# Patient Record
Sex: FEMALE | Race: WHITE | Employment: OTHER | URBAN - METROPOLITAN AREA
[De-identification: names, ages, dates, MRNs, and addresses within clinical notes are randomized per-mention and may not be internally consistent; named-entity substitution may affect disease eponyms.]

---

## 2017-03-24 PROBLEM — S70.351A: Status: ACTIVE | Noted: 2017-03-24

## 2017-03-24 PROBLEM — M54.32 BACK PAIN WITH LEFT-SIDED SCIATICA: Status: ACTIVE | Noted: 2017-03-24

## 2017-04-11 ENCOUNTER — HOSPITAL ENCOUNTER (OUTPATIENT)
Dept: OTHER | Age: 72
Discharge: HOME | End: 2017-04-11
Attending: INTERNAL MEDICINE | Admitting: INTERNAL MEDICINE

## 2017-04-11 LAB
ALBUMIN SERPL-MCNC: 4.4 GM/DL (ref 3.4–5)
ALP BLD-CCNC: 71 IU/L (ref 40–128)
ALT SERPL-CCNC: 13 U/L (ref 10–40)
ANION GAP SERPL CALCULATED.3IONS-SCNC: 17 MMOL/L (ref 4–16)
AST SERPL-CCNC: 15 IU/L (ref 15–37)
BILIRUB SERPL-MCNC: 0.3 MG/DL (ref 0–1)
BUN BLDV-MCNC: 17 MG/DL (ref 6–23)
CALCIUM SERPL-MCNC: 10 MG/DL (ref 8.3–10.6)
CEA: 1.6 NG/ML
CHLORIDE BLD-SCNC: 95 MMOL/L (ref 99–110)
CO2: 29 MMOL/L (ref 21–32)
CREAT SERPL-MCNC: 0.7 MG/DL (ref 0.6–1.1)
GFR AFRICAN AMERICAN: >60 ML/MIN/1.73M2
GFR NON-AFRICAN AMERICAN: >60 ML/MIN/1.73M2
GLUCOSE BLD-MCNC: 99 MG/DL (ref 70–140)
LACTATE DEHYDROGENASE: 217 IU/L (ref 120–246)
POTASSIUM SERPL-SCNC: 3.4 MMOL/L (ref 3.5–5.1)
SODIUM BLD-SCNC: 141 MMOL/L (ref 135–145)
TOTAL PROTEIN: 6.7 GM/DL (ref 6.4–8.2)
URIC ACID: 6 MG/DL (ref 2.6–6)

## 2017-04-13 LAB — CA 27.29: 14.4 U/ML

## 2017-04-17 ENCOUNTER — PAT TELEPHONE (OUTPATIENT)
Dept: SURGERY | Age: 72
End: 2017-04-17

## 2017-04-20 ENCOUNTER — HOSPITAL ENCOUNTER (OUTPATIENT)
Dept: SURGERY | Age: 72
Discharge: HOME | End: 2017-04-20
Attending: INTERNAL MEDICINE | Admitting: INTERNAL MEDICINE

## 2017-04-20 VITALS
WEIGHT: 226 LBS | HEIGHT: 65 IN | OXYGEN SATURATION: 99 % | TEMPERATURE: 98.4 F | BODY MASS INDEX: 37.65 KG/M2 | HEART RATE: 78 BPM | RESPIRATION RATE: 16 BRPM | DIASTOLIC BLOOD PRESSURE: 83 MMHG | SYSTOLIC BLOOD PRESSURE: 122 MMHG

## 2017-04-20 RX ORDER — SODIUM CHLORIDE, SODIUM LACTATE, POTASSIUM CHLORIDE, CALCIUM CHLORIDE 600; 310; 30; 20 MG/100ML; MG/100ML; MG/100ML; MG/100ML
INJECTION, SOLUTION INTRAVENOUS CONTINUOUS
Status: DISCONTINUED | OUTPATIENT
Start: 2017-04-20 | End: 2017-04-21 | Stop reason: HOSPADM

## 2017-04-20 RX ORDER — FUROSEMIDE 20 MG/1
20 TABLET ORAL DAILY
Status: ON HOLD | COMMUNITY
End: 2020-12-15 | Stop reason: HOSPADM

## 2017-04-20 RX ADMIN — SODIUM CHLORIDE, SODIUM LACTATE, POTASSIUM CHLORIDE, CALCIUM CHLORIDE: 600; 310; 30; 20 INJECTION, SOLUTION INTRAVENOUS at 09:31

## 2017-04-20 ASSESSMENT — PAIN DESCRIPTION - PAIN TYPE
TYPE: ACUTE PAIN
TYPE: ACUTE PAIN

## 2017-04-20 ASSESSMENT — PAIN DESCRIPTION - DESCRIPTORS
DESCRIPTORS: ACHING;DULL
DESCRIPTORS: ACHING

## 2017-04-20 ASSESSMENT — PAIN DESCRIPTION - LOCATION: LOCATION: HEAD

## 2017-04-20 ASSESSMENT — PAIN SCALES - GENERAL
PAINLEVEL_OUTOF10: 3
PAINLEVEL_OUTOF10: 3

## 2017-04-20 ASSESSMENT — PAIN - FUNCTIONAL ASSESSMENT: PAIN_FUNCTIONAL_ASSESSMENT: 0-10

## 2017-10-17 ENCOUNTER — HOSPITAL ENCOUNTER (OUTPATIENT)
Dept: OTHER | Age: 72
Discharge: HOME | End: 2017-10-17
Attending: INTERNAL MEDICINE | Admitting: INTERNAL MEDICINE

## 2017-10-17 LAB
ALBUMIN SERPL-MCNC: 4.6 GM/DL (ref 3.4–5)
ALP BLD-CCNC: 70 IU/L (ref 40–129)
ALT SERPL-CCNC: 13 U/L (ref 10–40)
ANION GAP SERPL CALCULATED.3IONS-SCNC: 15 MMOL/L (ref 4–16)
AST SERPL-CCNC: 17 IU/L (ref 15–37)
BILIRUB SERPL-MCNC: 0.4 MG/DL (ref 0–1)
BUN BLDV-MCNC: 22 MG/DL (ref 6–23)
CALCIUM SERPL-MCNC: 10.7 MG/DL (ref 8.3–10.6)
CHLORIDE BLD-SCNC: 99 MMOL/L (ref 99–110)
CO2: 29 MMOL/L (ref 21–32)
CREAT SERPL-MCNC: 0.7 MG/DL (ref 0.6–1.1)
GFR AFRICAN AMERICAN: >60 ML/MIN/1.73M2
GFR NON-AFRICAN AMERICAN: >60 ML/MIN/1.73M2
GLUCOSE BLD-MCNC: 87 MG/DL (ref 70–140)
LACTATE DEHYDROGENASE: 228 IU/L (ref 120–246)
POTASSIUM SERPL-SCNC: 4.6 MMOL/L (ref 3.5–5.1)
SODIUM BLD-SCNC: 143 MMOL/L (ref 135–145)
TOTAL PROTEIN: 6.8 GM/DL (ref 6.4–8.2)

## 2017-11-20 ENCOUNTER — HOSPITAL ENCOUNTER (OUTPATIENT)
Dept: WOMENS IMAGING | Age: 72
Discharge: HOME | End: 2017-11-20
Attending: INTERNAL MEDICINE | Admitting: INTERNAL MEDICINE

## 2017-11-20 DIAGNOSIS — Z78.0 ASYMPTOMATIC AGE-RELATED POSTMENOPAUSAL STATE: ICD-10-CM

## 2017-11-21 ENCOUNTER — HOSPITAL ENCOUNTER (OUTPATIENT)
Dept: CT IMAGING | Age: 72
Discharge: HOME | End: 2017-11-21
Attending: INTERNAL MEDICINE | Admitting: INTERNAL MEDICINE

## 2017-11-21 DIAGNOSIS — C50.411 MALIGNANT NEOPLASM OF UPPER-OUTER QUADRANT OF RIGHT FEMALE BREAST, UNSPECIFIED ESTROGEN RECEPTOR STATUS (HCC): ICD-10-CM

## 2017-11-21 LAB
ALBUMIN SERPL-MCNC: 4.8 GM/DL (ref 3.4–5)
ALP BLD-CCNC: 76 IU/L (ref 40–128)
ALT SERPL-CCNC: 29 U/L (ref 10–40)
ANION GAP SERPL CALCULATED.3IONS-SCNC: 13 MMOL/L (ref 4–16)
AST SERPL-CCNC: 20 IU/L (ref 15–37)
ATYPICAL LYMPHOCYTE ABSOLUTE COUNT: ABNORMAL
BANDED NEUTROPHILS ABSOLUTE COUNT: 0.1 K/CU MM
BANDED NEUTROPHILS RELATIVE PERCENT: 2 % (ref 5–11)
BILIRUB SERPL-MCNC: 0.4 MG/DL (ref 0–1)
BUN BLDV-MCNC: 18 MG/DL (ref 6–23)
CALCIUM SERPL-MCNC: 10.3 MG/DL (ref 8.3–10.6)
CEA: 1.8 NG/ML
CHLORIDE BLD-SCNC: 98 MMOL/L (ref 99–110)
CO2: 30 MMOL/L (ref 21–32)
CREAT SERPL-MCNC: 1 MG/DL (ref 0.6–1.1)
DIFFERENTIAL TYPE: ABNORMAL
EOSINOPHILS ABSOLUTE: 0.1 K/CU MM
EOSINOPHILS RELATIVE PERCENT: 1 % (ref 0–3)
GFR AFRICAN AMERICAN: >60 ML/MIN/1.73M2
GFR NON-AFRICAN AMERICAN: 55 ML/MIN/1.73M2
GLUCOSE FASTING: 104 MG/DL (ref 70–99)
HCT VFR BLD CALC: 46.7 % (ref 37–47)
HEMOGLOBIN: 15.4 GM/DL (ref 12.5–16)
LYMPHOCYTES ABSOLUTE: 1.7 K/CU MM
LYMPHOCYTES RELATIVE PERCENT: 33 % (ref 24–44)
MCH RBC QN AUTO: 33.7 PG (ref 27–31)
MCHC RBC AUTO-ENTMCNC: 33 % (ref 32–36)
MCV RBC AUTO: 102.2 FL (ref 78–100)
MONOCYTES ABSOLUTE: 0.3 K/CU MM
MONOCYTES RELATIVE PERCENT: 5 % (ref 0–4)
PDW BLD-RTO: 12.1 % (ref 11.7–14.9)
PLATELET # BLD: 230 K/CU MM (ref 140–440)
PMV BLD AUTO: 10 FL (ref 7.5–11.1)
POTASSIUM SERPL-SCNC: 4.5 MMOL/L (ref 3.5–5.1)
RBC # BLD: 4.57 M/CU MM (ref 4.2–5.4)
SEGMENTED NEUTROPHILS ABSOLUTE COUNT: 3 K/CU MM
SEGMENTED NEUTROPHILS RELATIVE PERCENT: 59 % (ref 36–66)
SODIUM BLD-SCNC: 141 MMOL/L (ref 135–145)
TOTAL PROTEIN: 7.1 GM/DL (ref 6.4–8.2)
WBC # BLD: 5.2 K/CU MM (ref 4–10.5)

## 2017-11-21 RX ORDER — 0.9 % SODIUM CHLORIDE 0.9 %
10 VIAL (ML) INJECTION
Status: COMPLETED | OUTPATIENT
Start: 2017-11-21 | End: 2017-11-21

## 2017-11-21 RX ORDER — TC 99M MEDRONATE 20 MG/10ML
25 INJECTION, POWDER, LYOPHILIZED, FOR SOLUTION INTRAVENOUS
Status: DISCONTINUED | OUTPATIENT
Start: 2017-11-21 | End: 2017-11-22 | Stop reason: HOSPADM

## 2017-11-21 RX ADMIN — Medication 10 ML: at 11:53

## 2017-11-30 LAB — CA 27.29: 15.4 U/ML

## 2018-01-19 ENCOUNTER — HOSPITAL ENCOUNTER (OUTPATIENT)
Dept: SLEEP CENTER | Age: 73
Discharge: HOME | End: 2018-01-19
Attending: FAMILY MEDICINE | Admitting: FAMILY MEDICINE

## 2018-01-19 VITALS
SYSTOLIC BLOOD PRESSURE: 119 MMHG | DIASTOLIC BLOOD PRESSURE: 72 MMHG | HEART RATE: 79 BPM | BODY MASS INDEX: 36.96 KG/M2 | WEIGHT: 230 LBS | HEIGHT: 66 IN

## 2018-01-19 DIAGNOSIS — R06.83 SNORING: ICD-10-CM

## 2018-01-19 DIAGNOSIS — G47.10 HYPERSOMNOLENCE: Primary | ICD-10-CM

## 2018-01-19 ASSESSMENT — SLEEP AND FATIGUE QUESTIONNAIRES
HOW LIKELY ARE YOU TO NOD OFF OR FALL ASLEEP WHILE LYING DOWN TO REST IN THE AFTERNOON WHEN CIRCUMSTANCES PERMIT: 0
HOW LIKELY ARE YOU TO NOD OFF OR FALL ASLEEP WHEN YOU ARE A PASSENGER IN A CAR FOR AN HOUR WITHOUT A BREAK: 0
HOW LIKELY ARE YOU TO NOD OFF OR FALL ASLEEP IN A CAR, WHILE STOPPED FOR A FEW MINUTES IN TRAFFIC: 0
HOW LIKELY ARE YOU TO NOD OFF OR FALL ASLEEP WHILE SITTING AND READING: 0
HOW LIKELY ARE YOU TO NOD OFF OR FALL ASLEEP WHILE SITTING AND TALKING TO SOMEONE: 0
ESS TOTAL SCORE: 3
HOW LIKELY ARE YOU TO NOD OFF OR FALL ASLEEP WHILE WATCHING TV: 3
HOW LIKELY ARE YOU TO NOD OFF OR FALL ASLEEP WHILE SITTING INACTIVE IN A PUBLIC PLACE: 0
NECK CIRCUMFERENCE (INCHES): 14.5
HOW LIKELY ARE YOU TO NOD OFF OR FALL ASLEEP WHILE SITTING QUIETLY AFTER LUNCH WITHOUT ALCOHOL: 0

## 2018-01-19 NOTE — PROGRESS NOTES
grandmother, paternal aunt, and paternal uncle; Depression in her sister; Emphysema in her mother; Heart Disease in her father, mother, and sister; Liver Disease in her sister. SOCIAL HISTORY:   reports that she has never smoked. She has never used smokeless tobacco.    ALLERGIES:  Patient is allergic to tape [adhesive tape] and antivert [meclizine hcl]. REVIEW OF SYSTEMS:  Constitutional: Negative for fever  HENT: Negative for sore throat  Eyes: Negative for redness   Respiratory: Negative for cough  Cardiovascular: Negative for chest pain  Gastrointestinal: Negative for vomiting, diarrhea    Musculoskeletal: Negative for arthralgias   Skin: Negative for rash  Neurological: Negative for syncope        Objective:   PHYSICAL EXAM:  Blood pressure 119/72, pulse 79, height 5' 5.5\" (1.664 m), weight 230 lb (104.3 kg), not currently breastfeeding.'    [place]: Total score: 3    Neck Circumference:  Neck circumference: 14.5  Inches    BMI:  Body mass index is 37.69 kg/m². Gen: No distress. Eyes: PERRL. No sclera icterus. No conjunctival injection. ENT: No discharge. Pharynx clear. External appearance of ears and nose normal.Mallampati score 4. Neck: Trachea midline. No obvious mass. Resp: No accessory muscle use. No crackles. No wheezes. No rhonchi. No dullness on percussion. CV: Regular rate. Regular rhythm. No murmur or rub. No edema. GI: Non-tender. Non-distended. No hernia. Skin: Warm, dry, normal texture and turgor. No nodule on exposed extremities. Lymph: No cervical LAD. No supraclavicular LAD. M/S: No cyanosis. No clubbing. No joint deformity. Neuro: Moves all four extremities. CN 2-12 tested, no defect noted. Psych: Oriented x 3. No anxiety. Awake. Alert. Intact judgement and insight.     Current Outpatient Prescriptions on File Prior to Encounter   Medication Sig Dispense Refill    PRAVASTATIN SODIUM PO Take by mouth      Anastrozole (ARIMIDEX PO) Take by mouth      CARBIDOPA PO

## 2018-02-20 ENCOUNTER — HOSPITAL ENCOUNTER (OUTPATIENT)
Dept: SLEEP CENTER | Age: 73
Discharge: HOME | End: 2018-02-20
Attending: INTERNAL MEDICINE | Admitting: INTERNAL MEDICINE

## 2018-02-20 VITALS — HEIGHT: 60 IN | WEIGHT: 215 LBS | BODY MASS INDEX: 42.21 KG/M2

## 2018-02-21 NOTE — PROGRESS NOTES
2/21/2018  sleep study  for Gorge Ulloa  1945 is complete. Results are pending physician review.     Electronically signed by Denisa Snyder on 2/21/2018 at 6:50 AM

## 2018-03-27 ENCOUNTER — HOSPITAL ENCOUNTER (OUTPATIENT)
Dept: ULTRASOUND IMAGING | Age: 73
Discharge: HOME | End: 2018-03-27
Attending: FAMILY MEDICINE | Admitting: FAMILY MEDICINE

## 2018-03-27 DIAGNOSIS — E04.1 NONTOXIC UNINODULAR GOITER: ICD-10-CM

## 2018-03-27 DIAGNOSIS — E04.1 NONTOXIC SINGLE THYROID NODULE: ICD-10-CM

## 2018-06-06 ENCOUNTER — HOSPITAL ENCOUNTER (OUTPATIENT)
Dept: OTHER | Age: 73
Discharge: HOME | End: 2018-06-06
Attending: INTERNAL MEDICINE | Admitting: INTERNAL MEDICINE

## 2018-06-06 LAB
ALBUMIN SERPL-MCNC: 4.6 GM/DL (ref 3.4–5)
ALP BLD-CCNC: 78 IU/L (ref 40–128)
ALT SERPL-CCNC: 39 U/L (ref 10–40)
ANION GAP SERPL CALCULATED.3IONS-SCNC: 15 MMOL/L (ref 4–16)
AST SERPL-CCNC: 30 IU/L (ref 15–37)
BILIRUB SERPL-MCNC: 0.4 MG/DL (ref 0–1)
BUN BLDV-MCNC: 20 MG/DL (ref 6–23)
CALCIUM SERPL-MCNC: 9.9 MG/DL (ref 8.3–10.6)
CEA: 2.1 NG/ML
CHLORIDE BLD-SCNC: 99 MMOL/L (ref 99–110)
CO2: 29 MMOL/L (ref 21–32)
CREAT SERPL-MCNC: 0.7 MG/DL (ref 0.6–1.1)
GFR AFRICAN AMERICAN: >60 ML/MIN/1.73M2
GFR NON-AFRICAN AMERICAN: >60 ML/MIN/1.73M2
GLUCOSE BLD-MCNC: 106 MG/DL (ref 70–99)
POTASSIUM SERPL-SCNC: 3.7 MMOL/L (ref 3.5–5.1)
SODIUM BLD-SCNC: 143 MMOL/L (ref 135–145)
TOTAL PROTEIN: 6.8 GM/DL (ref 6.4–8.2)

## 2018-06-08 LAB — CA 27.29: 12.9 U/ML

## 2019-01-22 ENCOUNTER — HOSPITAL ENCOUNTER (OUTPATIENT)
Age: 74
Setting detail: SPECIMEN
Discharge: HOME | End: 2019-01-22
Payer: MEDICARE

## 2019-01-22 LAB
ALBUMIN SERPL-MCNC: 4.4 GM/DL (ref 3.4–5)
ALP BLD-CCNC: 64 IU/L (ref 40–129)
ALT SERPL-CCNC: 22 U/L (ref 10–40)
ANION GAP SERPL CALCULATED.3IONS-SCNC: 14 MMOL/L (ref 4–16)
AST SERPL-CCNC: 22 IU/L (ref 15–37)
BILIRUB SERPL-MCNC: 0.3 MG/DL (ref 0–1)
BUN BLDV-MCNC: 19 MG/DL (ref 6–23)
CALCIUM SERPL-MCNC: 10.1 MG/DL (ref 8.3–10.6)
CEA: 2.1 NG/ML
CHLORIDE BLD-SCNC: 99 MMOL/L (ref 99–110)
CO2: 28 MMOL/L (ref 21–32)
CREAT SERPL-MCNC: 0.8 MG/DL (ref 0.6–1.1)
GFR AFRICAN AMERICAN: >60 ML/MIN/1.73M2
GFR NON-AFRICAN AMERICAN: >60 ML/MIN/1.73M2
GLUCOSE BLD-MCNC: 101 MG/DL (ref 70–99)
POTASSIUM SERPL-SCNC: 3.6 MMOL/L (ref 3.5–5.1)
SODIUM BLD-SCNC: 141 MMOL/L (ref 135–145)
TOTAL PROTEIN: 6.6 GM/DL (ref 6.4–8.2)

## 2019-01-22 PROCEDURE — 80053 COMPREHEN METABOLIC PANEL: CPT

## 2019-01-22 PROCEDURE — 86300 IMMUNOASSAY TUMOR CA 15-3: CPT

## 2019-01-22 PROCEDURE — 82378 CARCINOEMBRYONIC ANTIGEN: CPT

## 2019-01-25 LAB — CA 27.29: 14.8 U/ML

## 2019-06-01 ENCOUNTER — APPOINTMENT (OUTPATIENT)
Dept: GENERAL RADIOLOGY | Age: 74
End: 2019-06-01
Payer: MEDICARE

## 2019-06-01 ENCOUNTER — APPOINTMENT (OUTPATIENT)
Dept: ULTRASOUND IMAGING | Age: 74
End: 2019-06-01
Payer: MEDICARE

## 2019-06-01 ENCOUNTER — HOSPITAL ENCOUNTER (EMERGENCY)
Age: 74
Discharge: HOME | End: 2019-06-01
Payer: MEDICARE

## 2019-06-01 VITALS
RESPIRATION RATE: 15 BRPM | HEIGHT: 66 IN | OXYGEN SATURATION: 98 % | SYSTOLIC BLOOD PRESSURE: 142 MMHG | BODY MASS INDEX: 37.93 KG/M2 | TEMPERATURE: 97.9 F | DIASTOLIC BLOOD PRESSURE: 108 MMHG | HEART RATE: 58 BPM | WEIGHT: 236 LBS

## 2019-06-01 DIAGNOSIS — M25.572 ACUTE LEFT ANKLE PAIN: Primary | ICD-10-CM

## 2019-06-01 DIAGNOSIS — M25.472 LEFT ANKLE SWELLING: ICD-10-CM

## 2019-06-01 LAB — URIC ACID: 5.2 MG/DL (ref 2.6–6)

## 2019-06-01 PROCEDURE — 36415 COLL VENOUS BLD VENIPUNCTURE: CPT

## 2019-06-01 PROCEDURE — 6370000000 HC RX 637 (ALT 250 FOR IP): Performed by: PHYSICIAN ASSISTANT

## 2019-06-01 PROCEDURE — 84550 ASSAY OF BLOOD/URIC ACID: CPT

## 2019-06-01 PROCEDURE — 73610 X-RAY EXAM OF ANKLE: CPT

## 2019-06-01 PROCEDURE — 99284 EMERGENCY DEPT VISIT MOD MDM: CPT

## 2019-06-01 PROCEDURE — 93971 EXTREMITY STUDY: CPT

## 2019-06-01 RX ORDER — HYDROCODONE BITARTRATE AND ACETAMINOPHEN 5; 325 MG/1; MG/1
1 TABLET ORAL EVERY 6 HOURS PRN
Qty: 10 TABLET | Refills: 0 | Status: SHIPPED | OUTPATIENT
Start: 2019-06-01 | End: 2019-06-04

## 2019-06-01 RX ORDER — HYDROCODONE BITARTRATE AND ACETAMINOPHEN 5; 325 MG/1; MG/1
1 TABLET ORAL ONCE
Status: COMPLETED | OUTPATIENT
Start: 2019-06-01 | End: 2019-06-01

## 2019-06-01 RX ADMIN — HYDROCODONE BITARTRATE AND ACETAMINOPHEN 1 TABLET: 5; 325 TABLET ORAL at 18:32

## 2019-06-01 ASSESSMENT — PAIN DESCRIPTION - LOCATION: LOCATION: ANKLE

## 2019-06-01 ASSESSMENT — PAIN SCALES - GENERAL
PAINLEVEL_OUTOF10: 9
PAINLEVEL_OUTOF10: 9

## 2019-06-01 ASSESSMENT — PAIN DESCRIPTION - PAIN TYPE: TYPE: ACUTE PAIN

## 2019-06-01 ASSESSMENT — PAIN DESCRIPTION - DESCRIPTORS: DESCRIPTORS: SHARP;STABBING

## 2019-06-01 ASSESSMENT — PAIN DESCRIPTION - ORIENTATION: ORIENTATION: LEFT

## 2019-06-01 NOTE — ED PROVIDER NOTES
eMERGENCY dEPARTMENT eNCOUnter        [address] [facility]    Chief Complaint   Patient presents with    Ankle Pain     left ankle pain, patient states unable to tolerate weight bearing on left ankle       HPI    Elsa Moreno is a 76 y.o. female who presents with left ankle pain and swelling. Onset was today. Context:  Patient states she had a caudal block in the right hip yesterday at [facility] performed by Dr. Georges Arce. She states he told her she may have some swelling due to the steroids but stated this would probably be in her face. Pain and swelling is the left ankle. Constant since onset. Pain is worse with weightbearing. Characterized as sharp, stabbing. Severity 9/10. She states it feels similar to previous gout flares and plantar fasciitis. Denies redness or warmth. Denies chest pain or sob. No recent travel, trauma, surgery. No history of DVT or PE.      REVIEW OF SYSTEMS    See HPI for further details. Review of systems otherwise negative. Constitutional:  Denies fever. Respiratory:  Denies any shortness of breath. Cardiovascular:  Denies chest pain. Musculoskeletal:  + left ankle pain, swelling. Integument:  Denies skin changes. Neurologic:  Denies numbness, tingling.      PAST MEDICAL HISTORY    Past Medical History:   Diagnosis Date    Arthritis     Asthma     last flare up 5 yrs ago    Cancer [facility])     \"dx with right breast cancer 1972- tx with surgery and tx with chemo and radiation- then 2013 it came  back in the same breast- tx with chemo -follows with Dr Dariela Faulkner History of blood transfusion     History of motion sickness     History of UTI     last one UTI    Hyperlipidemia     Hypertension     IBS (irritable bowel syndrome)     Lumbar stenosis     PONV (postoperative nausea and vomiting)     Reflux     Stress incontinence     Thyroid nodule     for bx 4/27/2017       SURGICAL HISTORY    Past Surgical History:   Procedure Laterality Date    APPENDECTOMY      took with the hyster    CHOLECYSTECTOMY  2004    COLONOSCOPY  2011    COLONOSCOPY  04/20/2017    Normal    DILATION AND CURETTAGE OF UTERUS  40+ yr ago    after miscarriage     FINGER SURGERY Left 2014    index finger - removed a nodule    FOREARM SURGERY Right 1998     fx  repair with pin placement    HAND TENDON SURGERY  2012    Tendon removed left arm placed left thumb    HEEL SPUR SURGERY Bilateral right    done in 2015 and right done 2013    HYSTERECTOMY  age 45    \"took ovaries and appendix also\"    JOINT REPLACEMENT  right    knee- done 2011    JOINT REPLACEMENT  left    knee- done 12/2016    MASTECTOMY, RADICAL Right 1972    took one lymph node and was positive    OTHER SURGICAL HISTORY  04/27/2017    Thyroid biopsy    SKIN BIOPSY      TUNNELED VENOUS PORT PLACEMENT  2013    left side of chest       CURRENT MEDICATIONS    Current Outpatient Rx   Medication Sig Dispense Refill    PRAVASTATIN SODIUM PO Take by mouth      Anastrozole (ARIMIDEX PO) Take by mouth      CARBIDOPA PO Take by mouth      FUROSEMIDE PO Take by mouth      ibuprofen (ADVIL;MOTRIN) 800 MG tablet 1 TABLET BY MOUTH TWICE A DAY FOR BACK PAIN 40 tablet 0    tiZANidine (ZANAFLEX) 4 MG tablet       traMADol (ULTRAM) 50 MG tablet every 6 hours as needed       cyclobenzaprine (FLEXERIL) 10 MG tablet Take 0.5 tablets by mouth 2 times daily as needed for Muscle spasms 12 tablet 0    naphazoline-pheniramine (VISINE-A) 0.025-0.3 % ophthalmic solution Place 1 drop into both eyes daily.  polyethyl glycol-propyl glycol 0.4-0.3 % (SYSTANE) 0.4-0.3 % ophthalmic solution 1 drop nightly.  atenolol (TENORMIN) 25 MG tablet Take 25 mg by mouth daily.  fluticasone (VERAMYST) 27.5 MCG/SPRAY nasal spray 2 sprays by Nasal route daily.  hydrochlorothiazide (HYDRODIURIL) 25 MG tablet Take 25 mg by mouth daily.  omeprazole (PRILOSEC) 20 MG capsule Take 20 mg by mouth daily.         acetaminophen (TYLENOL) 500 MG tablet Take 500 mg by mouth 3 times daily as needed.  bismuth subsalicylate (PEPTO BISMOL) 262 MG/15ML suspension Take 15 mLs by mouth daily as needed. ALLERGIES    Allergies   Allergen Reactions    Tape Kathlee Denver Tape] Dermatitis    Antivert [Meclizine Hcl] Rash       FAMILY HISTORY    Family History   Problem Relation Age of Onset    Emphysema Mother     Heart Disease Mother     Heart Disease Father         MI    Liver Disease Sister     Depression Sister     Heart Disease Sister     Cancer Paternal Aunt     Cancer Paternal Uncle     Cancer Maternal Grandmother        SOCIAL HISTORY    Social History     Socioeconomic History    Marital status:       Spouse name: None    Number of children: None    Years of education: None    Highest education level: None   Occupational History    None   Social Needs    Financial resource strain: None    Food insecurity:     Worry: None     Inability: None    Transportation needs:     Medical: None     Non-medical: None   Tobacco Use    Smoking status: Never Smoker    Smokeless tobacco: Never Used   Substance and Sexual Activity    Alcohol use: No    Drug use: No    Sexual activity: None   Lifestyle    Physical activity:     Days per week: None     Minutes per session: None    Stress: None   Relationships    Social connections:     Talks on phone: None     Gets together: None     Attends Orthodoxy service: None     Active member of club or organization: None     Attends meetings of clubs or organizations: None     Relationship status: None    Intimate partner violence:     Fear of current or ex partner: None     Emotionally abused: None     Physically abused: None     Forced sexual activity: None   Other Topics Concern    None   Social History Narrative    None       PHYSICAL EXAM    VITAL SIGNS: BP (!) 160/75   Pulse 76   Temp 97.9 °F (36.6 °C)   Resp 16   Ht 5' 5.5\" (1.664 m)   Wt 236 lb (107 kg)   SpO2 99%   BMI 38.68 kg/m²   Constitutional:  Well developed, well nourished, no acute distress, non-toxic appearance   HENT:  NC/AT. Ears, nose, mouth normal.  Respiratory:  Normal respiratory effort. Musculoskeletal:  Pitting edema of the bilateral LE, left slightly worse than right with mild tenderness to palpation around the ankle. No tenderness to the sole of the foot, calf, or tibial plateau. Dorsalis pedis pulse symmetric. Integument:  Warm and dry. No erythema, no rashes or lesions. No bruising. Neurologic:  Sensation intact. RADIOLOGY/PROCEDURES    Labs Reviewed   URIC ACID     Xr Ankle Left (min 3 Views)    Result Date: 6/1/2019  EXAMINATION: 3 XRAY VIEWS OF THE LEFT ANKLE 6/1/2019 6:45 pm COMPARISON: None. HISTORY: ORDERING SYSTEM PROVIDED HISTORY: swelling TECHNOLOGIST PROVIDED HISTORY: Reason for exam:->swelling Ordering Physician Provided Reason for Exam: swelling, pain Acuity: Acute Type of Exam: Initial FINDINGS: 3 views of the ankle demonstrate no acute fracture or dislocation. Ankle mortise is congruent. No suspicious osseous lesion. No significant degenerative changes. Prominent heel spur is seen. No soft tissue swelling. No ankle joint effusion. 1. No acute osseous or soft tissue abnormality of the left ankle. Vl Dup Lower Extremity Venous Left    Result Date: 6/1/2019  EXAMINATION: DUPLEX VENOUS ULTRASOUND OF THE LEFT LOWER EXTREMITY 6/1/2019 6:32 pm TECHNIQUE: Duplex ultrasound and Doppler images were obtained of the left lower extremity. COMPARISON: None. HISTORY: ORDERING SYSTEM PROVIDED HISTORY: swelling TECHNOLOGIST PROVIDED HISTORY: Reason for exam:->swelling Ordering Physician Provided Reason for Exam: left leg swelling Acuity: Acute Type of Exam: Initial Additional signs and symptoms: nk Relevant Medical/Surgical History: nk FINDINGS: The visualized veins of the left lower extremity are patent and free of echogenic thrombus.  The veins are normally compressible and have normal phasic flow.     No evidence of DVT in the left lower extremity. ED COURSE & MEDICAL DECISION MAKING    -  Patient seen and evaluated in the emergency department. -  Triage and nursing notes reviewed and incorporated. -  Old chart records reviewed and incorporated. -  Supervising physician was Dr. Olesya Chris. Patient was seen independently. -  Differential diagnosis includes:  fracture, sprain, dislocation, and others  -  Work-up included:  See above  -  Patient treated with Ever Mccarty in the ED.  -  Results discussed with patient. US is negative for DVT. No acute bony abnormalities. Uric acid is normal.  Unclear etiology of patient's swelling and pain. However, she reports improvement after Norco.  We will ACE wrap, instructed on RICE, Rx [place].  FU with PCP in 2-3 days, return here as needed. She is agreeable with plan of care and disposition.  -  Patient discharged home. FINAL IMPRESSION    1. Acute left ankle pain    2.  Left ankle swelling                Excell AZUL Rothman  06/01/19 1561

## 2019-06-01 NOTE — ED NOTES
Patient presents to ER with c/o left ankle pain. Patient states she has been unable to bear weight on left ankle since this am. Patient states she had a caudal block at [facility]. Patient states she has a history of plantar fascitis and states symptoms feel similar.       Madonna Salazar RN  06/01/19 [id number]

## 2019-06-02 NOTE — ED NOTES
Patient resting quietly on cart in position of comfort. Denies needs.       Raleigh Paulino RN  06/01/19 [id number]

## 2019-09-09 ENCOUNTER — HOSPITAL ENCOUNTER (OUTPATIENT)
Age: 74
Setting detail: SPECIMEN
Discharge: HOME | End: 2019-09-09
Payer: MEDICARE

## 2019-09-09 LAB
ALBUMIN SERPL-MCNC: 4 GM/DL (ref 3.4–5)
ALP BLD-CCNC: 58 IU/L (ref 40–128)
ALT SERPL-CCNC: 14 U/L (ref 10–40)
ANION GAP SERPL CALCULATED.3IONS-SCNC: 10 MMOL/L (ref 4–16)
AST SERPL-CCNC: 17 IU/L (ref 15–37)
BILIRUB SERPL-MCNC: 0.2 MG/DL (ref 0–1)
BUN BLDV-MCNC: 17 MG/DL (ref 6–23)
CALCIUM SERPL-MCNC: 9.5 MG/DL (ref 8.3–10.6)
CHLORIDE BLD-SCNC: 107 MMOL/L (ref 99–110)
CO2: 27 MMOL/L (ref 21–32)
CREAT SERPL-MCNC: 0.6 MG/DL (ref 0.6–1.1)
GFR AFRICAN AMERICAN: >60 ML/MIN/1.73M2
GFR NON-AFRICAN AMERICAN: >60 ML/MIN/1.73M2
GLUCOSE BLD-MCNC: 106 MG/DL (ref 70–99)
MAGNESIUM: 2.2 MG/DL (ref 1.8–2.4)
PHOSPHORUS: 1.9 MG/DL (ref 2.5–4.9)
POTASSIUM SERPL-SCNC: 3.9 MMOL/L (ref 3.5–5.1)
SODIUM BLD-SCNC: 144 MMOL/L (ref 135–145)
TOTAL PROTEIN: 5.9 GM/DL (ref 6.4–8.2)

## 2019-09-09 PROCEDURE — 83735 ASSAY OF MAGNESIUM: CPT

## 2019-09-09 PROCEDURE — 80053 COMPREHEN METABOLIC PANEL: CPT

## 2019-09-09 PROCEDURE — 84100 ASSAY OF PHOSPHORUS: CPT

## 2020-01-07 ENCOUNTER — APPOINTMENT (OUTPATIENT)
Dept: GENERAL RADIOLOGY | Age: 75
End: 2020-01-07
Payer: MEDICARE

## 2020-01-07 ENCOUNTER — APPOINTMENT (OUTPATIENT)
Dept: ULTRASOUND IMAGING | Age: 75
End: 2020-01-07
Payer: MEDICARE

## 2020-01-07 ENCOUNTER — HOSPITAL ENCOUNTER (EMERGENCY)
Age: 75
Discharge: HOME | End: 2020-01-07
Attending: EMERGENCY MEDICINE
Payer: MEDICARE

## 2020-01-07 VITALS
SYSTOLIC BLOOD PRESSURE: 160 MMHG | TEMPERATURE: 97.9 F | HEIGHT: 60 IN | HEART RATE: 97 BPM | RESPIRATION RATE: 16 BRPM | BODY MASS INDEX: 42.41 KG/M2 | WEIGHT: 216 LBS | DIASTOLIC BLOOD PRESSURE: 91 MMHG | OXYGEN SATURATION: 97 %

## 2020-01-07 PROCEDURE — 73590 X-RAY EXAM OF LOWER LEG: CPT

## 2020-01-07 PROCEDURE — 93971 EXTREMITY STUDY: CPT

## 2020-01-07 PROCEDURE — 99283 EMERGENCY DEPT VISIT LOW MDM: CPT

## 2020-01-07 PROCEDURE — 73630 X-RAY EXAM OF FOOT: CPT

## 2020-01-07 ASSESSMENT — ENCOUNTER SYMPTOMS
EYES NEGATIVE: 1
RESPIRATORY NEGATIVE: 1
GASTROINTESTINAL NEGATIVE: 1
BACK PAIN: 1
ALLERGIC/IMMUNOLOGIC NEGATIVE: 1

## 2020-01-07 ASSESSMENT — PAIN SCALES - GENERAL: PAINLEVEL_OUTOF10: 8

## 2020-01-07 NOTE — ED PROVIDER NOTES
History:   Procedure Laterality Date    APPENDECTOMY      took with the hyster    CHOLECYSTECTOMY  2004    COLONOSCOPY  2011    COLONOSCOPY  04/20/2017    Normal    DILATION AND CURETTAGE OF UTERUS  40+ yr ago    after miscarriage     FINGER SURGERY Left 2014    index finger - removed a nodule    FOREARM SURGERY Right 1998     fx  repair with pin placement    HAND TENDON SURGERY  2012    Tendon removed left arm placed left thumb    HEEL SPUR SURGERY Bilateral right    done in 2015 and right done 2013    HYSTERECTOMY  age 45    \"took ovaries and appendix also\"    JOINT REPLACEMENT  right    knee- done 2011    JOINT REPLACEMENT  left    knee- done 12/2016    MASTECTOMY, RADICAL Right 1972    took one lymph node and was positive    OTHER SURGICAL HISTORY  04/27/2017    Thyroid biopsy    SKIN BIOPSY      TUNNELED VENOUS PORT PLACEMENT  2013    left side of chest     Family History   Problem Relation Age of Onset    Emphysema Mother     Heart Disease Mother     Heart Disease Father         [facility] Liver Disease Sister     Depression Sister     Heart Disease Sister     Cancer Paternal Aunt     Cancer Paternal Uncle     Cancer Maternal Grandmother      Social History     Socioeconomic History    Marital status:       Spouse name: Not on file    Number of children: Not on file    Years of education: Not on file    Highest education level: Not on file   Occupational History    Not on file   Social Needs    Financial resource strain: Not on file    Food insecurity:     Worry: Not on file     Inability: Not on file    Transportation needs:     Medical: Not on file     Non-medical: Not on file   Tobacco Use    Smoking status: Never Smoker    Smokeless tobacco: Never Used   Substance and Sexual Activity    Alcohol use: No    Drug use: No    Sexual activity: Not on file   Lifestyle    Physical activity:     Days per week: Not on file     Minutes per session: Not on file    Stress: Not on file   Relationships    Social connections:     Talks on phone: Not on file     Gets together: Not on file     Attends Yazidism service: Not on file     Active member of club or organization: Not on file     Attends meetings of clubs or organizations: Not on file     Relationship status: Not on file    Intimate partner violence:     Fear of current or ex partner: Not on file     Emotionally abused: Not on file     Physically abused: Not on file     Forced sexual activity: Not on file   Other Topics Concern    Not on file   Social History Narrative    Not on file     No current facility-administered medications for this encounter. Current Outpatient Medications   Medication Sig Dispense Refill    PRAVASTATIN SODIUM PO Take by mouth      Anastrozole (ARIMIDEX PO) Take by mouth      CARBIDOPA PO Take by mouth      FUROSEMIDE PO Take by mouth      ibuprofen (ADVIL;MOTRIN) 800 MG tablet 1 TABLET BY MOUTH TWICE A DAY FOR BACK PAIN 40 tablet 0    tiZANidine (ZANAFLEX) 4 MG tablet       traMADol (ULTRAM) 50 MG tablet every 6 hours as needed       cyclobenzaprine (FLEXERIL) 10 MG tablet Take 0.5 tablets by mouth 2 times daily as needed for Muscle spasms 12 tablet 0    naphazoline-pheniramine (VISINE-A) 0.025-0.3 % ophthalmic solution Place 1 drop into both eyes daily.  polyethyl glycol-propyl glycol 0.4-0.3 % (SYSTANE) 0.4-0.3 % ophthalmic solution 1 drop nightly.  atenolol (TENORMIN) 25 MG tablet Take 25 mg by mouth daily.  fluticasone (VERAMYST) 27.5 MCG/SPRAY nasal spray 2 sprays by Nasal route daily.  hydrochlorothiazide (HYDRODIURIL) 25 MG tablet Take 25 mg by mouth daily.  omeprazole (PRILOSEC) 20 MG capsule Take 20 mg by mouth daily.  acetaminophen (TYLENOL) 500 MG tablet Take 500 mg by mouth 3 times daily as needed.  bismuth subsalicylate (PEPTO BISMOL) 262 MG/15ML suspension Take 15 mLs by mouth daily as needed.           Allergies   Allergen Normal appearance. She is well-developed and well-groomed. She is obese. She is not ill-appearing, toxic-appearing or diaphoretic. HENT:      Head: Normocephalic and atraumatic. Right Ear: External ear normal.      Left Ear: External ear normal.      Nose: Nose normal. No congestion or rhinorrhea. Mouth/Throat:      Mouth: Mucous membranes are moist.      Pharynx: No oropharyngeal exudate or posterior oropharyngeal erythema. Eyes:      General: No scleral icterus. Right eye: No discharge. Left eye: No discharge. Extraocular Movements: Extraocular movements intact. Conjunctiva/sclera: Conjunctivae normal.      Pupils: Pupils are equal, round, and reactive to light. Neck:      Musculoskeletal: Full passive range of motion without pain and normal range of motion. Normal range of motion. No edema, erythema, neck rigidity, crepitus or injury. Vascular: No JVD. Trachea: Phonation normal.   Cardiovascular:      Rate and Rhythm: Normal rate and regular rhythm. Pulses: Normal pulses. Heart sounds: Normal heart sounds. No murmur. No friction rub. No gallop. Pulmonary:      Effort: Pulmonary effort is normal. No respiratory distress. Breath sounds: Normal breath sounds. No stridor. No wheezing, rhonchi or rales. Abdominal:      General: Bowel sounds are normal. There is no distension. Palpations: Abdomen is soft. There is no mass. Tenderness: There is no tenderness. There is no guarding or rebound. Hernia: No hernia is present. Musculoskeletal: Normal range of motion. General: Swelling and tenderness present. No deformity or signs of injury. Right ankle: She exhibits swelling. Left ankle: She exhibits swelling. Right lower leg: Edema present. Left lower leg: Edema present. Left foot: Normal capillary refill. Tenderness and swelling present. No crepitus, deformity or laceration.         Feet:    Skin: General: Skin is warm. Coloration: Skin is not jaundiced or pale. Findings: No bruising, erythema, lesion or rash. Neurological:      General: No focal deficit present. Mental Status: She is alert and oriented to person, place, and time. GCS: GCS eye subscore is 4. GCS verbal subscore is 5. GCS motor subscore is 6. Cranial Nerves: Cranial nerves are intact. No cranial nerve deficit. Sensory: Sensation is intact. No sensory deficit. Motor: Motor function is intact. No weakness, tremor, atrophy, abnormal muscle tone or seizure activity. Coordination: Coordination is intact. Coordination normal.      Gait: Gait is intact. Gait normal.   Psychiatric:         Mood and Affect: Mood normal.         Behavior: Behavior normal. Behavior is cooperative. Thought Content: Thought content normal.         Judgment: Judgment normal.           I have reviewed andinterpreted all of the currently available lab results from this visit (if applicable):    No results found for this visit on 01/07/20. Radiographs (if obtained):  [] The following radiograph was interpreted by myself in the absence of a radiologist:  [x] Radiologist's Report Reviewed:    Xr Tibia Fibula Right (2 Views)    Result Date: 1/7/2020  EXAMINATION: 2 XRAY VIEWS OF THE RIGHT TIBIA AND FIBULA 1/7/2020 2:30 pm COMPARISON: None HISTORY: ORDERING SYSTEM PROVIDED HISTORY: pain TECHNOLOGIST PROVIDED HISTORY: Reason for exam:->pain Reason for Exam: pain Acuity: Acute Type of Exam: Initial Additional signs and symptoms: right lower leg pain. Onset approximately several months, markedly worse for 7 days. No history of trauma. FINDINGS: Incompletely included changes of total knee arthroplasty with no definite complication. No acute fracture. Native joints maintain anatomic alignment. No obvious acute soft tissue abnormality. 1. No acute findings in the right leg.  2. Incompletely evaluated right total knee trauma. Was worried about DVT in her right leg and her left fifth toe. So far work-up was performed before I saw her imaging is negative I saw patient she has good pulses good sensation compartments are soft no signs of cellulitis. I did offer further labs and imaging to rule out DVT in her left leg and kidney failure and heart failure and infection she wants to go home she denies other questions or concerns she has good pulses no signs of trauma no signs of ischemia no signs of rash no signs of distress vitals are stable no signs of cauda equina or sciatica. Patient again was to go home she is refusing further work-up patient discharged home in stable condition given return precautions and follow-up information. I did advise she wear compression stockings she states she does not like to wear those. Patient discharged.       CLINICAL IMPRESSION:  Final diagnoses:   Peripheral edema   Left foot pain       (Please note that portions of this note may have been completed with a voice recognition program. Efforts were made to edit the dictations but occasionally words aremis-transcribed.)    DISPOSITION REFERRAL (if applicable):  Jessie Guajardo MD  [address]  [address] [id number] [phone]    In 1 day      [address] [facility] Emergency Department  [address] [facility]  [phone]    If symptoms worsen      DISPOSITION MEDICATIONS (if applicable):  Discharge Medication List as of 1/7/2020  4:24 PM             Gissel Fishman, [facility],   01/07/20 2139

## 2020-01-07 NOTE — ED PROVIDER NOTES
As triage Physician Assistant, I performed a medical screening history and physical exam on this patient. HISTORY OF PRESENT ILLNESS  Claire Puckett is a 76 y.o. female  who presents with right lower leg pain. Onset approximately several months, markedly worse for 7 days. No history of trauma. Patient also notes toe pain on the left foot. Again she notes no trauma. She does note associated swelling. PHYSICAL EXAM  BP (!) 160/91   Pulse 97   Temp 97.9 °F (36.6 °C) (Oral)   Resp 16   Ht 5' (1.524 m)   Wt 216 lb (98 kg)   SpO2 97%   BMI 42.18 kg/m²         On exam, the patient appears well-hydrated, well-nourished, and in no acute distress. Mucous membranes are moist. Breathing is unlabored. Skin is dry. Mental status appears normal. Moves all extremities, and is without facial droop. Speech is clear. Any necessary Labs, Imaging, and/or treatment were initiated and patient moved to an emergency department exam room. Comment: Please note this report has been produced using speech recognition software and may contain errors related to that system including errors in grammar, punctuation, and spelling, as well as words and phrases that may be inappropriate. If there are any questions or concerns please feel free to contact the dictating provider for clarification.        Giovany Triplett  01/07/20 8634

## 2020-02-12 ENCOUNTER — HOSPITAL ENCOUNTER (OUTPATIENT)
Dept: INFUSION THERAPY | Age: 75
Discharge: HOME | End: 2020-02-12
Payer: MEDICARE

## 2020-02-12 LAB
ALBUMIN SERPL-MCNC: 4.7 GM/DL (ref 3.4–5)
ALP BLD-CCNC: 67 IU/L (ref 40–128)
ALT SERPL-CCNC: <5 U/L (ref 10–40)
ANION GAP SERPL CALCULATED.3IONS-SCNC: 12 MMOL/L (ref 4–16)
AST SERPL-CCNC: 18 IU/L (ref 15–37)
BILIRUB SERPL-MCNC: 0.2 MG/DL (ref 0–1)
BUN BLDV-MCNC: 16 MG/DL (ref 6–23)
CALCIUM SERPL-MCNC: 10.2 MG/DL (ref 8.3–10.6)
CHLORIDE BLD-SCNC: 97 MMOL/L (ref 99–110)
CO2: 30 MMOL/L (ref 21–32)
CREAT SERPL-MCNC: 0.7 MG/DL (ref 0.6–1.1)
DIFFERENTIAL TYPE: ABNORMAL
EOSINOPHILS ABSOLUTE: 0.1 K/CU MM
EOSINOPHILS RELATIVE PERCENT: 1 % (ref 0–3)
GFR AFRICAN AMERICAN: >60 ML/MIN/1.73M2
GFR NON-AFRICAN AMERICAN: >60 ML/MIN/1.73M2
GLUCOSE BLD-MCNC: 115 MG/DL (ref 70–99)
HCT VFR BLD CALC: 44.6 % (ref 37–47)
HEMOGLOBIN: 14.4 GM/DL (ref 12.5–16)
LYMPHOCYTES ABSOLUTE: 1 K/CU MM
LYMPHOCYTES RELATIVE PERCENT: 19 % (ref 24–44)
MCH RBC QN AUTO: 34.3 PG (ref 27–31)
MCHC RBC AUTO-ENTMCNC: 32.3 % (ref 32–36)
MCV RBC AUTO: 106.2 FL (ref 78–100)
MONOCYTES ABSOLUTE: 0.4 K/CU MM
MONOCYTES RELATIVE PERCENT: 8 % (ref 0–4)
PDW BLD-RTO: 12.9 % (ref 11.7–14.9)
PLATELET # BLD: 224 K/CU MM (ref 140–440)
PMV BLD AUTO: 9.8 FL (ref 7.5–11.1)
POTASSIUM SERPL-SCNC: 3.1 MMOL/L (ref 3.5–5.1)
RBC # BLD: 4.2 M/CU MM (ref 4.2–5.4)
SEGMENTED NEUTROPHILS ABSOLUTE COUNT: 4 K/CU MM
SEGMENTED NEUTROPHILS RELATIVE PERCENT: 72 % (ref 36–66)
SODIUM BLD-SCNC: 139 MMOL/L (ref 135–145)
TOTAL PROTEIN: 7.2 GM/DL (ref 6.4–8.2)
WBC # BLD: ABNORMAL K/CU MM (ref 4–10.5)

## 2020-02-12 PROCEDURE — 80053 COMPREHEN METABOLIC PANEL: CPT

## 2020-02-12 PROCEDURE — 36415 COLL VENOUS BLD VENIPUNCTURE: CPT

## 2020-02-12 PROCEDURE — 85025 COMPLETE CBC W/AUTO DIFF WBC: CPT

## 2020-02-12 PROCEDURE — 86300 IMMUNOASSAY TUMOR CA 15-3: CPT

## 2020-02-14 LAB
CA 15-3: 15 U/ML (ref 0–31)
CA 15-3: NORMAL U/ML (ref 0–31)
CA 27.29: 20.2 U/ML (ref 0–40)
CA 27.29: NORMAL U/ML (ref 0–40)

## 2020-06-15 ENCOUNTER — HOSPITAL ENCOUNTER (OUTPATIENT)
Dept: INFUSION THERAPY | Age: 75
Discharge: HOME | End: 2020-06-15
Payer: MEDICARE

## 2020-06-15 PROCEDURE — 99211 OFF/OP EST MAY X REQ PHY/QHP: CPT

## 2020-07-06 ENCOUNTER — HOSPITAL ENCOUNTER (OUTPATIENT)
Dept: INFUSION THERAPY | Age: 75
End: 2020-07-06
Payer: MEDICARE

## 2020-07-06 ENCOUNTER — HOSPITAL ENCOUNTER (OUTPATIENT)
Dept: INFUSION THERAPY | Age: 75
Discharge: HOME | End: 2020-07-06
Payer: MEDICARE

## 2020-07-06 LAB
ALBUMIN SERPL-MCNC: 4.8 GM/DL (ref 3.4–5)
ALP BLD-CCNC: 71 IU/L (ref 40–128)
ALT SERPL-CCNC: 18 U/L (ref 10–40)
ANION GAP SERPL CALCULATED.3IONS-SCNC: 11 MMOL/L (ref 4–16)
AST SERPL-CCNC: 19 IU/L (ref 15–37)
BASOPHILS ABSOLUTE: 0 K/CU MM
BASOPHILS RELATIVE PERCENT: 0.2 % (ref 0–1)
BILIRUB SERPL-MCNC: 0.4 MG/DL (ref 0–1)
BUN BLDV-MCNC: 20 MG/DL (ref 6–23)
CALCIUM SERPL-MCNC: 10.2 MG/DL (ref 8.3–10.6)
CHLORIDE BLD-SCNC: 98 MMOL/L (ref 99–110)
CO2: 32 MMOL/L (ref 21–32)
CREAT SERPL-MCNC: 0.8 MG/DL (ref 0.6–1.1)
DIFFERENTIAL TYPE: ABNORMAL
EOSINOPHILS ABSOLUTE: 0.1 K/CU MM
EOSINOPHILS RELATIVE PERCENT: 1.1 % (ref 0–3)
GFR AFRICAN AMERICAN: >60 ML/MIN/1.73M2
GFR NON-AFRICAN AMERICAN: >60 ML/MIN/1.73M2
GLUCOSE BLD-MCNC: 113 MG/DL (ref 70–99)
HCT VFR BLD CALC: 44.3 % (ref 37–47)
HEMOGLOBIN: 14.6 GM/DL (ref 12.5–16)
LYMPHOCYTES ABSOLUTE: 1.3 K/CU MM
LYMPHOCYTES RELATIVE PERCENT: 20.6 % (ref 24–44)
MCH RBC QN AUTO: 34.4 PG (ref 27–31)
MCHC RBC AUTO-ENTMCNC: 33 % (ref 32–36)
MCV RBC AUTO: 104.5 FL (ref 78–100)
MONOCYTES ABSOLUTE: 0.6 K/CU MM
MONOCYTES RELATIVE PERCENT: 9.9 % (ref 0–4)
PDW BLD-RTO: 13.1 % (ref 11.7–14.9)
PLATELET # BLD: 225 K/CU MM (ref 140–440)
PMV BLD AUTO: 10.3 FL (ref 7.5–11.1)
POTASSIUM SERPL-SCNC: 3.3 MMOL/L (ref 3.5–5.1)
RBC # BLD: 4.24 M/CU MM (ref 4.2–5.4)
SEGMENTED NEUTROPHILS ABSOLUTE COUNT: 4.2 K/CU MM
SEGMENTED NEUTROPHILS RELATIVE PERCENT: 68.2 % (ref 36–66)
SODIUM BLD-SCNC: 141 MMOL/L (ref 135–145)
TOTAL PROTEIN: 7.3 GM/DL (ref 6.4–8.2)
WBC # BLD: 6.2 K/CU MM (ref 4–10.5)

## 2020-07-06 PROCEDURE — 86300 IMMUNOASSAY TUMOR CA 15-3: CPT

## 2020-07-06 PROCEDURE — 80053 COMPREHEN METABOLIC PANEL: CPT

## 2020-07-06 PROCEDURE — 85025 COMPLETE CBC W/AUTO DIFF WBC: CPT

## 2020-07-06 PROCEDURE — 36415 COLL VENOUS BLD VENIPUNCTURE: CPT

## 2020-07-06 PROCEDURE — 99211 OFF/OP EST MAY X REQ PHY/QHP: CPT

## 2020-07-08 LAB — CA 15-3: 16 U/ML (ref 0–31)

## 2020-07-09 LAB — CA 27.29: 13.9 U/ML (ref 0–40)

## 2020-07-16 ENCOUNTER — HOSPITAL ENCOUNTER (OUTPATIENT)
Dept: PET IMAGING | Age: 75
Discharge: HOME | End: 2020-07-16
Payer: MEDICARE

## 2020-07-16 PROCEDURE — A9552 F18 FDG: HCPCS | Performed by: INTERNAL MEDICINE

## 2020-07-16 PROCEDURE — 2580000003 HC RX 258: Performed by: INTERNAL MEDICINE

## 2020-07-16 PROCEDURE — 3430000000 HC RX DIAGNOSTIC RADIOPHARMACEUTICAL: Performed by: INTERNAL MEDICINE

## 2020-07-16 PROCEDURE — 78815 PET IMAGE W/CT SKULL-THIGH: CPT

## 2020-07-16 RX ORDER — FLUDEOXYGLUCOSE F 18 200 MCI/ML
13.72 INJECTION, SOLUTION INTRAVENOUS
Status: COMPLETED | OUTPATIENT
Start: 2020-07-16 | End: 2020-07-16

## 2020-07-16 RX ORDER — SODIUM CHLORIDE 0.9 % (FLUSH) 0.9 %
10 SYRINGE (ML) INJECTION PRN
Status: COMPLETED | OUTPATIENT
Start: 2020-07-16 | End: 2020-07-16

## 2020-07-16 RX ADMIN — FLUDEOXYGLUCOSE F 18 13.72 MILLICURIE: 200 INJECTION, SOLUTION INTRAVENOUS at 09:49

## 2020-07-16 RX ADMIN — SODIUM CHLORIDE, PRESERVATIVE FREE 10 ML: 5 INJECTION INTRAVENOUS at 09:49

## 2020-07-20 ENCOUNTER — HOSPITAL ENCOUNTER (OUTPATIENT)
Dept: INFUSION THERAPY | Age: 75
Discharge: HOME | End: 2020-07-20
Payer: MEDICARE

## 2020-07-20 PROBLEM — R22.9 LOCALIZED SWELLING, MASS AND LUMP, UNSPECIFIED: Status: ACTIVE | Noted: 2020-07-20

## 2020-07-20 PROBLEM — C50.411 MALIGNANT NEOPLASM OF UPPER-OUTER QUADRANT OF RIGHT FEMALE BREAST (HCC): Status: ACTIVE | Noted: 2020-07-20

## 2020-07-20 PROBLEM — E83.30 DISORDER OF PHOSPHORUS METABOLISM, UNSPECIFIED: Status: ACTIVE | Noted: 2020-07-20

## 2020-07-20 PROBLEM — T82.898A OTHER SPECIFIED COMPLICATION OF VASCULAR PROSTHETIC DEVICES, IMPLANTS AND GRAFTS, INITIAL ENCOUNTER (HCC): Status: ACTIVE | Noted: 2020-07-20

## 2020-07-20 PROBLEM — E66.9 OBESITY, UNSPECIFIED: Status: ACTIVE | Noted: 2020-07-20

## 2020-07-20 PROBLEM — Z79.811 LONG TERM (CURRENT) USE OF AROMATASE INHIBITORS: Status: ACTIVE | Noted: 2020-07-20

## 2020-07-20 PROBLEM — M89.8X9 OTHER SPECIFIED DISORDERS OF BONE, UNSPECIFIED SITE: Status: ACTIVE | Noted: 2020-07-20

## 2020-07-20 PROBLEM — K12.30 ORAL MUCOSITIS (ULCERATIVE), UNSPECIFIED: Status: ACTIVE | Noted: 2020-07-20

## 2020-08-11 ENCOUNTER — HOSPITAL ENCOUNTER (OUTPATIENT)
Dept: INFUSION THERAPY | Age: 75
Discharge: HOME | End: 2020-08-11
Payer: MEDICARE

## 2020-08-11 ENCOUNTER — OFFICE VISIT (OUTPATIENT)
Dept: ONCOLOGY | Age: 75
End: 2020-08-11
Payer: MEDICARE

## 2020-08-11 VITALS
TEMPERATURE: 97.1 F | HEIGHT: 60 IN | HEART RATE: 76 BPM | WEIGHT: 218 LBS | SYSTOLIC BLOOD PRESSURE: 139 MMHG | RESPIRATION RATE: 16 BRPM | BODY MASS INDEX: 42.8 KG/M2 | OXYGEN SATURATION: 96 % | DIASTOLIC BLOOD PRESSURE: 82 MMHG

## 2020-08-11 PROCEDURE — 99214 OFFICE O/P EST MOD 30 MIN: CPT | Performed by: INTERNAL MEDICINE

## 2020-08-11 PROCEDURE — 99211 OFF/OP EST MAY X REQ PHY/QHP: CPT

## 2020-08-11 ASSESSMENT — PATIENT HEALTH QUESTIONNAIRE - PHQ9
SUM OF ALL RESPONSES TO PHQ QUESTIONS 1-9: 0
1. LITTLE INTEREST OR PLEASURE IN DOING THINGS: 0
2. FEELING DOWN, DEPRESSED OR HOPELESS: 0
SUM OF ALL RESPONSES TO PHQ QUESTIONS 1-9: 0
SUM OF ALL RESPONSES TO PHQ9 QUESTIONS 1 & 2: 0

## 2020-08-11 NOTE — PROGRESS NOTES
MA Rooming Questions  Patient: Juan Lo  MRN: [id number]    Date: 8/11/2020        1. Do you have any new issues? yes - states dressing from surgery is causing irritation          2. Do you need any refills on medications?    no    3. Have you had any imaging done since your last visit?   no    4. Have you been hospitalized or seen in the emergency room since your last visit here?   no    5. Did the patient have a PHQ-9 collected today? Yes  PHQ-9 Total Score: 0 (8/11/2020 10:27 AM)      PHQ-9 Given to: shameka              PHQ-9 Score:                     [] Positive     [x]  Negative              Does question #9 need addressed?                      [] Yes    [x]  No               Jeremy Robertson MA

## 2020-08-11 NOTE — PROGRESS NOTES
cervical, supraclavicular or axillary lymphadenopathy   LUNGS: ctab  Breast; Rt mastectomy.  Dressing right axillary area with intact drain  CARDIOVASCULAR: s1s2 rrr no murmurs   ABDOMEN: soft ntnd bs pos  NEUROLOGIC:GI   SKIN: No rash   EXTREMITIES:Bilateral LE edema 2-3+      Labs:    Hematology:  Lab Results   Component Value Date    WBC 6.2 07/06/2020    RBC 4.24 07/06/2020    HGB 14.6 07/06/2020    HCT 44.3 07/06/2020    .5 (H) 07/06/2020    MCH 34.4 (H) 07/06/2020    MCHC 33.0 07/06/2020    RDW 13.1 07/06/2020     07/06/2020    MPV 10.3 07/06/2020    BANDSPCT 2 (L) 11/21/2017    SEGSPCT 68.2 (H) 07/06/2020    EOSRELPCT 1.1 07/06/2020    BASOPCT 0.2 07/06/2020    LYMPHOPCT 20.6 (L) 07/06/2020    MONOPCT 9.9 (H) 07/06/2020    BANDABS 0.10 11/21/2017    SEGSABS 4.2 07/06/2020    EOSABS 0.1 07/06/2020    BASOSABS 0.0 07/06/2020    LYMPHSABS 1.3 07/06/2020    MONOSABS 0.6 07/06/2020    DIFFTYPE AUTOMATED DIFFERENTIAL 07/06/2020    POLYCHROM 1+ 12/12/2016    PLTM FEW 12/12/2016     Lab Results   Component Value Date    ESR 12 03/08/2017       Chemistry:  Lab Results   Component Value Date     07/06/2020    K 3.3 (L) 07/06/2020    CL 98 (L) 07/06/2020    CO2 32 07/06/2020    BUN 20 07/06/2020    CREATININE 0.8 07/06/2020    GLUCOSE 113 (H) 07/06/2020    CALCIUM 10.2 07/06/2020    PROT 7.3 07/06/2020    LABALBU 4.8 07/06/2020    BILITOT 0.4 07/06/2020    ALKPHOS 71 07/06/2020    AST 19 07/06/2020    ALT 18 07/06/2020    LABGLOM >60 07/06/2020    GFRAA >60 07/06/2020    PHOS 1.9 (L) 09/09/2019    MG 2.2 09/09/2019     Lab Results   Component Value Date     10/17/2017     No components found for: LD  Lab Results   Component Value Date    TSHHS 1.190 04/27/2017    T4FREE 1.25 04/27/2017    FT3 3.2 04/27/2017       Immunology:  Lab Results   Component Value Date    PROT 7.3 07/06/2020     No results found for: Jacy Reece KLFLCR  No results found for: B2M    Coagulation Panel:  Lab Results   Component Value Date    PROTIME 10.4 04/27/2017    INR 0.91 04/27/2017    APTT 23.8 04/27/2017    DDIMER 919 (H) 12/12/2016       Anemia Panel:  Lab Results   Component Value Date    QHQHXIHP43 331 07/18/2011       Tumor Markers:  Lab Results   Component Value Date    CEA 2.1 01/22/2019    LABCA2 13.9 07/06/2020         Imaging: Reviewed     Pathology:Reviewed     Observations:  Performance Status: ECOG 2   Depression Status: PHQ 9 neg       Assessment & Plan:  1. Ca Breast 1972 with Right Ax Recurrence Nov 2012   Initial diagnosis in 1972 when she had right modified radical mastectomy. She had isolated local recurrences in the early eighties, for which she had excision, followed by radiation therapy and hormonal therapy. After being off treatment for many years, she had another recurrence in November of 2012 and after excision, she had minimal residual disease and has been on maintenance Arimidex for five years after initial chemotherapy with a taxane. Dr Bao Duffy recommendation was to keep her on hormonal therapy for at least 10 years or even longer. Has been off of AI intermittently for bone pain, last time since feb 2020. 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 No other metastatic disease. S/P surgical resection  Will follow up on pathology. If margins negative will recommend continuation of AI, if not will see if XRt is a possibility. Is not very enthusiastic about any systemic therapy  Continue AI and monitor for AE. Dexa scan 11/2017 with osteopenia. Recommend continuation of calcium and vitamin D. Repeat dexa scan    LE edema:is on lasix, Compression stockings and also recommend feet elevation    Mild hypokalemia: Recommend OTC K supplements. Macrocytosis without anemia: Will observe for now.     Continue other medical care    Discussed the above findings and plan with her and she verbalized understanding    Discussed Lifestyle, healthy diet and regular exercise as tolerated. Also discussed importance of being up-to-date with age-appropriate screening tools, Left mammogram due again feb 2021. Recommend follow-up with primary care physician and other specialists. Please do not hesitate to contact us if you need ay further information. Return to clinic in September 22 2020 or earlier if new symptoms. I have recommended that the patient follow CDC guidelines for prevention of COVID-19 infection.     [address]     Electronically signed by Lisa Clarke MD on 8/11/20 at 10:58 AM EDT

## 2020-09-02 ENCOUNTER — HOSPITAL ENCOUNTER (OUTPATIENT)
Dept: RADIATION ONCOLOGY | Age: 75
Discharge: HOME | End: 2020-09-02
Payer: MEDICARE

## 2020-09-02 VITALS
BODY MASS INDEX: 36.01 KG/M2 | DIASTOLIC BLOOD PRESSURE: 75 MMHG | RESPIRATION RATE: 16 BRPM | OXYGEN SATURATION: 98 % | TEMPERATURE: 98.1 F | HEART RATE: 88 BPM | SYSTOLIC BLOOD PRESSURE: 131 MMHG | WEIGHT: 216.4 LBS

## 2020-09-02 PROCEDURE — 99205 OFFICE O/P NEW HI 60 MIN: CPT | Performed by: RADIOLOGY

## 2020-09-02 PROCEDURE — 99202 OFFICE O/P NEW SF 15 MIN: CPT

## 2020-09-02 ASSESSMENT — PAIN - FUNCTIONAL ASSESSMENT: PAIN_FUNCTIONAL_ASSESSMENT: PREVENTS OR INTERFERES SOME ACTIVE ACTIVITIES AND ADLS

## 2020-09-02 ASSESSMENT — PAIN DESCRIPTION - PROGRESSION: CLINICAL_PROGRESSION: NOT CHANGED

## 2020-09-02 ASSESSMENT — PAIN DESCRIPTION - LOCATION: LOCATION: LEG;GROIN;BACK

## 2020-09-02 ASSESSMENT — PAIN SCALES - GENERAL: PAINLEVEL_OUTOF10: 4

## 2020-09-02 ASSESSMENT — PAIN DESCRIPTION - ONSET: ONSET: ON-GOING

## 2020-09-02 ASSESSMENT — PAIN DESCRIPTION - ORIENTATION: ORIENTATION: RIGHT

## 2020-09-02 ASSESSMENT — PAIN DESCRIPTION - PAIN TYPE: TYPE: CHRONIC PAIN

## 2020-09-02 ASSESSMENT — PAIN DESCRIPTION - FREQUENCY: FREQUENCY: CONTINUOUS

## 2020-09-02 NOTE — PROGRESS NOTES
Mayur Valdez  9/2/2020    CONSULT     Vitals:    09/02/20 1107   BP: 131/75   Pulse: 88   Resp: 16   Temp: 98.1 °F (36.7 °C)   SpO2: 98%          Wt Readings from Last 3 Encounters:   09/02/20 216 lb 6.4 oz (98.2 kg)   08/19/20 198 lb (89.8 kg)   08/12/20 218 lb (98.9 kg)        Pain Assessment  Pain Assessment: 0-10  Pain Level: 4  Patient's Stated Pain Goal: No pain  Pain Type: Chronic pain  Pain Location: Leg, Groin, Back  Pain Orientation: Right  Pain Frequency: Continuous  Pain Onset: On-going  Clinical Progression: Not changed  Functional Pain Assessment: Prevents or interferes some active activities and ADLs  OTC Analgesics    Fall Risk:    Fall risk  Impaired/Unsteady Gait, Uses Walker and Re-Evaluate Weekly    Nutritional Alteration:  None  No Difficulties Swallowing  Voices Sufficient Oral Intake    Mediport: yes, left side    Pacemaker/ICD: no    Implants: yes, bilateral knee replacements    Previous XRT: yes, right chest wall in 1982    Assessment:  Patient originally dx with right-sided breast CA in 1972 - treated with radical mastectomy. States she has had 14 recurrences since. Has had chemotherapy and RT in past.  Now with new recurrence of right lateral chest wall - excised per Dr. Maxx Wise on 8/7/20 with clear margins. Scar well-approximated and healed. Patient here to discuss RT options with Dr. Salvador Gill.     Past Medical History:   Diagnosis Date    Arthritis     Asthma     last flare up 5 yrs ago    Cancer [facility])     \"dx with right breast cancer 1972- tx with surgery and tx with chemo and radiation- then 2013 it came  back in the same breast- tx with chemo -follows with Dr Ariana Etienne Hiatal hernia     History of blood transfusion     History of external beam radiation therapy 1982    right chest wall    History of motion sickness     History of UTI     last one UTI    Hyperlipidemia     Hypertension     IBS (irritable bowel syndrome)     Lumbar stenosis     PONV (postoperative nausea and vomiting)     Reflux     Stress incontinence     Thyroid nodule     for bx 4/27/2017       Past Surgical History:   Procedure Laterality Date    APPENDECTOMY      took with the hyster    AXILLARY SURGERY Right 08/07/2020    right lateral chest wall/axillary mass excision per Dr. Bard Carrizales at [address]  2004    COLONOSCOPY  2011    COLONOSCOPY  04/20/2017    Normal    DILATION AND CURETTAGE OF UTERUS  40+ yr ago    after miscarriage     FINGER SURGERY Left 2014    index finger - removed a nodule    FOREARM SURGERY Right 1998     fx  repair with pin placement    HAND TENDON SURGERY  2012    Tendon removed left arm placed left thumb    HEEL SPUR SURGERY Bilateral right    done in 2015 and right done 2013    HYSTERECTOMY  age 45    \"took ovaries and appendix also\"    JOINT REPLACEMENT  right    knee- done 2011    JOINT REPLACEMENT  left    knee- done 12/2016    MASTECTOMY, RADICAL Right 1972    took one lymph node and was positive    OTHER SURGICAL HISTORY  04/27/2017    Thyroid biopsy    OTHER SURGICAL HISTORY Right 1982    right axillary mass bx - breast CA recurrence    SKIN BIOPSY      TUNNELED VENOUS PORT PLACEMENT  2013    left side of chest       Allergies   Allergen Reactions    Grass Extracts [Gramineae Pollens]     Adhesive Tape Dermatitis and Rash    Antivert [Meclizine Hcl] Rash    Atorvastatin Calcium Other (See Comments)     Other reaction(s): joint pain    Meclizine Rash     Rash    Meclizine Hcl Rash    Monascus Purpureus Went Yeast Rash     Other reaction(s): rash    Piperazine Rash          Current Outpatient Medications:     loperamide (IMODIUM A-D) 2 MG tablet, take 2 tablet by oral route after 1st loose stool and 1 tablet (2 mg) after each next bowel movement; do not exceed 16 mg in 24hrs, Disp: , Rfl:     anastrozole (ARIMIDEX) 1 MG tablet, Take 1 mg by mouth every other day , Disp: , Rfl:     POTASSIUM GLUCONATE PO, Take 99 mg by mouth daily, Disp: , Rfl:     B Complex-C (SUPER B COMPLEX PO), Take 1 tablet by mouth daily, Disp: , Rfl:     allopurinol (ZYLOPRIM) 100 MG tablet, Take 100 mg by mouth daily, Disp: , Rfl:     diphenhydrAMINE (BENADRYL) 25 MG tablet, Take 25 mg by mouth 2 times daily, Disp: , Rfl:     acetaminophen (TYLENOL 8 HOUR) 650 MG extended release tablet, Take 650 mg by mouth 2 times daily, Disp: , Rfl:     Cholecalciferol (D3 ADULT PO), Take 1 tablet by mouth daily, Disp: , Rfl:     Calcium Acetate, Phos Binder, (CALCIUM ACETATE PO), Take 1 tablet by mouth daily, Disp: , Rfl:     FUROSEMIDE PO, Take 20 mg by mouth 3 times daily as needed , Disp: , Rfl:     naphazoline-pheniramine (VISINE-A) 0.025-0.3 % ophthalmic solution, Place 1 drop into both eyes 2 times daily , Disp: , Rfl:     polyethyl glycol-propyl glycol 0.4-0.3 % (SYSTANE) 0.4-0.3 % ophthalmic solution, 1 drop nightly.  , Disp: , Rfl:     atenolol (TENORMIN) 25 MG tablet, Take 25 mg by mouth daily. , Disp: , Rfl:     hydrochlorothiazide (HYDRODIURIL) 25 MG tablet, Take 25 mg by mouth daily. , Disp: , Rfl:     omeprazole (PRILOSEC) 20 MG capsule, Take 20 mg by mouth daily. , Disp: , Rfl:     acetaminophen (TYLENOL) 500 MG tablet, Take 500 mg by mouth 2 times daily , Disp: , Rfl:     bismuth subsalicylate (PEPTO BISMOL) 262 MG/15ML suspension, Take 15 mLs by mouth daily as needed. , Disp: , Rfl:     ADDITIONAL COMMENTS:  Initial consultation with Dr. Sobeida Sanz.       Electronically signed by Tyrese Lindsay RN on 9/2/2020 at 12:23 PM

## 2020-09-02 NOTE — PROGRESS NOTES
Radiation Oncology Consultation  Encounter Date: 2020 12:37 PM        Ms. Walter Lyons is a 76 y.o. female  : 1945  MRN: [id number]  Acct Number: [de-identified]  Requesting Provider: Dr. Ngoc Zayas: Ne Kennedy:   Primary Care: Anmol Pendleton MD   Medical Oncologist: No primary care provider on file. REASON FOR CONSULTATION:   Recurrent Breast Cancer      DIAGNOSIS:  Cancer Staging  No matching staging information was found for the patient. Original staging in  not available  Most recent recurrence staging: rpT2 Nx cM0      WORKUP AND TX COURSE:  Oncology History   Malignant neoplasm of upper-outer quadrant of right female breast ([address]    Initial Diagnosis    Malignant neoplasm of upper-outer quadrant of right female breast ([address]      Relapse    Right Axilla      Surgery    Right Modified Radical Mastectomy      Relapse    Chest Wall Recurrence      -  Chemotherapy    Cytoxan, Methotrexate, 5-FU      Relapse    Patient reports multiple local recurrences treated with surgery between initital mastectomy and . She had TANO+BSO and additional HT for 5-10 years following radiation     1983 - 1983 Radiation    Right Chest Wall  200 cGy x 23 = 4600 cGy  [facility]     2012 Relapse    Right Axillary Mass biopsy proven to be invasive ca c/w breast primary       2012 - 2013 Chemotherapy    Taxol x 6 courses - Day 1,8 of 21 day cycle  Early partial response with complete resolution over next few years     2013 -  Chemotherapy    Arimidex    Changed to Letrozole in 2019   Changed back to Arimidex Oct 2019 - 2020 and restarted 2020 Relapse    limited right breast axillary ultrasound revealed highly suspicious right breast 9:00 mass correlating to the palpable complaint     2020 Biopsy    invasive ductal carcinoma grade 2. ER greater than 95% positive. MD negative.  HER-2 by IHC negative CURETTAGE OF UTERUS  40+ yr ago    after miscarriage     FINGER SURGERY Left 2014    index finger - removed a nodule    FOREARM SURGERY Right 1998     fx  repair with pin placement    HAND TENDON SURGERY  2012    Tendon removed left arm placed left thumb    HEEL SPUR SURGERY Bilateral right    done in 2015 and right done 2013    HYSTERECTOMY  age 45    \"took ovaries and appendix also\"    JOINT REPLACEMENT  right    knee- done 2011    JOINT REPLACEMENT  left    knee- done 12/2016    MASTECTOMY, RADICAL Right 1972    took one lymph node and was positive    OTHER SURGICAL HISTORY  04/27/2017    Thyroid biopsy    OTHER SURGICAL HISTORY Right 1982    right axillary mass bx - breast CA recurrence    SKIN BIOPSY      TUNNELED VENOUS PORT PLACEMENT  2013    left side of chest       Family History   Problem Relation Age of Onset    Emphysema Mother     Heart Disease Mother     Heart Disease Father         MI   Barbara Arch Liver Disease Sister     Depression Sister     Heart Disease Sister     Cancer Paternal Aunt     Cancer Paternal Uncle     Cancer Maternal Grandmother        Social History     Socioeconomic History    Marital status:       Spouse name: Not on file    Number of children: Not on file    Years of education: Not on file    Highest education level: Not on file   Occupational History    Not on file   Social Needs    Financial resource strain: Not on file    Food insecurity     Worry: Not on file     Inability: Not on file    Transportation needs     Medical: Not on file     Non-medical: Not on file   Tobacco Use    Smoking status: Never Smoker    Smokeless tobacco: Never Used   Substance and Sexual Activity    Alcohol use: No    Drug use: No    Sexual activity: Not on file   Lifestyle    Physical activity     Days per week: Not on file     Minutes per session: Not on file    Stress: Not on file   Relationships    Social connections     Talks on phone: Not on file     Gets together: Not on file     Attends Taoist service: Not on file     Active member of club or organization: Not on file     Attends meetings of clubs or organizations: Not on file     Relationship status: Not on file    Intimate partner violence     Fear of current or ex partner: Not on file     Emotionally abused: Not on file     Physically abused: Not on file     Forced sexual activity: Not on file   Other Topics Concern    Not on file   Social History Narrative    Not on file         ALLERGIES:   Allergies   Allergen Reactions    Grass Extracts [Gramineae Pollens]     Adhesive Tape Dermatitis and Rash    Antivert [Meclizine Hcl] Rash    Atorvastatin Calcium Other (See Comments)     Other reaction(s): joint pain    Meclizine Rash     Rash    Meclizine Hcl Rash    Monascus Purpureus Went Yeast Rash     Other reaction(s): rash    Piperazine Rash           Medication List      ASK your doctor about these medications     * acetaminophen 500 MG tablet; Commonly known as: TYLENOL   * Tylenol 8 Hour 650 MG extended release tablet; Generic drug:   acetaminophen   allopurinol 100 MG tablet; Commonly known as: ZYLOPRIM   anastrozole 1 MG tablet; Commonly known as: ARIMIDEX   atenolol 25 MG tablet; Commonly known as: TENORMIN   bismuth subsalicylate 021 LR/87YU suspension; Commonly known as: PEPTO   BISMOL   CALCIUM ACETATE PO   D3 ADULT PO   diphenhydrAMINE 25 MG tablet; Commonly known as: BENADRYL   FUROSEMIDE PO   hydroCHLOROthiazide 25 MG tablet; Commonly known as: HYDRODIURIL   loperamide 2 MG tablet; Commonly known as: IMODIUM A-D   omeprazole 20 MG delayed release capsule; Commonly known as: PRILOSEC   polyethyl glycol-propyl glycol 0.4-0.3 % 0.4-0.3 % ophthalmic solution;   Commonly known as: SYSTANE   POTASSIUM GLUCONATE PO   SUPER B COMPLEX PO   Visine-A 0.025-0.3 % ophthalmic solution; Generic drug:   naphazoline-pheniramine  * This list has 2 medication(s) that are the same as other medications   prescribed for you. Read the directions carefully, and ask your doctor or   other care provider to review them with you. REVIEW OF SYSTEMS:   Pertinents as in HPI, the remainder of the 14-point ROS is otherwise negative and has been signed and included in the medical record.       PHYSICAL EXAMINATION:   VITAL SIGNS: /75   Pulse 88   Temp 98.1 °F (36.7 °C) (Oral)   Resp 16   Wt 216 lb 6.4 oz (98.2 kg)   SpO2 98%   BMI 36.01 kg/m²   KARNOFSKY PERFORMANCE STATUS: 100    PAIN RATIN    A&Ox3, NAD  Sclera anicteric, EOMI, no temporalis muscle wasting  No cervical, SCV, axillary LAD  Respirations unlabored  Abd soft, NTND, no HSM  No spinal, paraspinal, or other bony tenderness to palpation  Mild RUE lymphedema  Normal mood, affect, judgement  Breast exam reveals normal left breast tissue, right breast surgically absent, right chest wall has an old healed scar in a superior/inferior direction with slight cording at the superior aspect, healing scar from recent procedure without surrounding erythema or wound discharge, skin of right chest wall with fibrotic changes and telangiectasias      LABORATORY STUDIES:   CBC:   Lab Results   Component Value Date    WBC 6.2 2020    RBC 4.24 2020    HGB 14.6 2020    HCT 44.3 2020    .5 2020    MCH 34.4 2020    MCHC 33.0 2020    RDW 13.1 2020     2020    MPV 10.3 2020     CMP:  Lab Results   Component Value Date     2020    K 3.3 2020    CL 98 2020    CO2 32 2020    BUN 20 2020    CREATININE 0.8 2020    GFRAA >60 2020    LABGLOM >60 2020    GLUCOSE 113 2020    PROT 7.3 2020    PROT 6.5 2013    LABALBU 4.8 2020    CALCIUM 10.2 2020    BILITOT 0.4 2020    ALKPHOS 71 2020    AST 19 2020    ALT 18 2020     Onc labs:   Lab Results   Component Value Date    CEA 2.1 01/22/2019     10/17/2017       RADIOLOGIC STUDIES:     PET/CT 7/16/2020     Impression    1.  Metabolically active nodule along the inferolateral margin of the right    mastectomy site concerning for recurrent disease.  This has slowly enlarged    since 11/21/2017, which may also suggest a benign etiology.  Recommend biopsy.         2.  No other metabolically active disease.                  I have personally reviewed the relevant radiographic images. ASSESSMENT/PLAN:  Long discussion of treatment options today regarding her multiply recurrent right breast cancer. We reviewed NCCN guidelines and retrospective data for chest wall re-irradiation in the setting of localized recurrences after previously having mastectomy and post-mastectomy radiation. NCCN guidelines suggest surgery alone if chestwall radiation has previously been given. However there are retrospective studies showing a possible benefit to doing re-irradiation and that serious adverse effects are uncommon. Other retrospective studies report up to 20% risk of >= grade 3 toxicity including soft tissue fibrosis, telangiectasias, and skin ulceration. I discussed the logistics of planning and delivering radiation treatments along with anticipated potential acute and chronic toxicities including but not limited to:  fatigue, skin erythema, desquamation, breast fibrosis, breast density changes, lymphedema, brachial plexopathy, radiation esophagitis, esophageal stricture, radiation pneumonitis, chest wall damage with increased risk of rib fracture, pericarditis, pericardial effusion, increased risk of heart disease, risk of secondary malignancy. After a long discussion she has decided to not pursue chest wall radiation at this time given negative surgical margins and good surgical results previously but will reconsider if she has a further recurrence that is not resectable.   At this point she will continue to follow with Dr. Blaire Patrick for [facility] and Dr. Roseann Klinefelter for continued monitoring. Thank-you for allowing me to participate in the care of this patient and please do not hesitate to contact me for any questions or concerns. TIME SPENT: 60 minutes spent during this consultation,  >50% spent in counseling/discussion/education.       Electronically signed by Electronically signed by Fleet Fleischer, MD on 9/2/2020 at 12:37 PM

## 2020-09-24 ENCOUNTER — HOSPITAL ENCOUNTER (OUTPATIENT)
Dept: INFUSION THERAPY | Age: 75
Discharge: HOME | End: 2020-09-24
Payer: MEDICARE

## 2020-09-24 ENCOUNTER — OFFICE VISIT (OUTPATIENT)
Dept: ONCOLOGY | Age: 75
End: 2020-09-24
Payer: MEDICARE

## 2020-09-24 VITALS
OXYGEN SATURATION: 97 % | RESPIRATION RATE: 16 BRPM | HEART RATE: 75 BPM | WEIGHT: 215 LBS | HEIGHT: 65 IN | BODY MASS INDEX: 35.82 KG/M2 | DIASTOLIC BLOOD PRESSURE: 70 MMHG | TEMPERATURE: 97.4 F | SYSTOLIC BLOOD PRESSURE: 143 MMHG

## 2020-09-24 VITALS
DIASTOLIC BLOOD PRESSURE: 70 MMHG | HEART RATE: 75 BPM | OXYGEN SATURATION: 97 % | WEIGHT: 215 LBS | TEMPERATURE: 97.4 F | SYSTOLIC BLOOD PRESSURE: 143 MMHG | BODY MASS INDEX: 35.78 KG/M2

## 2020-09-24 DIAGNOSIS — C50.411 MALIGNANT NEOPLASM OF UPPER-OUTER QUADRANT OF RIGHT BREAST IN FEMALE, ESTROGEN RECEPTOR POSITIVE (HCC): Primary | ICD-10-CM

## 2020-09-24 DIAGNOSIS — Z17.0 MALIGNANT NEOPLASM OF UPPER-OUTER QUADRANT OF RIGHT BREAST IN FEMALE, ESTROGEN RECEPTOR POSITIVE (HCC): Primary | ICD-10-CM

## 2020-09-24 DIAGNOSIS — M89.8X9 OTHER SPECIFIED DISORDERS OF BONE, UNSPECIFIED SITE: ICD-10-CM

## 2020-09-24 LAB
ALBUMIN SERPL-MCNC: 4.4 GM/DL (ref 3.4–5)
ALP BLD-CCNC: 57 IU/L (ref 40–128)
ALT SERPL-CCNC: 19 U/L (ref 10–40)
ANION GAP SERPL CALCULATED.3IONS-SCNC: 13 MMOL/L (ref 4–16)
AST SERPL-CCNC: 17 IU/L (ref 15–37)
BASOPHILS ABSOLUTE: 0 K/CU MM
BASOPHILS RELATIVE PERCENT: 0.5 % (ref 0–1)
BILIRUB SERPL-MCNC: 0.4 MG/DL (ref 0–1)
BUN BLDV-MCNC: 13 MG/DL (ref 6–23)
CALCIUM SERPL-MCNC: 9.6 MG/DL (ref 8.3–10.6)
CHLORIDE BLD-SCNC: 100 MMOL/L (ref 99–110)
CO2: 29 MMOL/L (ref 21–32)
CREAT SERPL-MCNC: 0.6 MG/DL (ref 0.6–1.1)
DIFFERENTIAL TYPE: ABNORMAL
EOSINOPHILS ABSOLUTE: 0.1 K/CU MM
EOSINOPHILS RELATIVE PERCENT: 2.5 % (ref 0–3)
GFR AFRICAN AMERICAN: >60 ML/MIN/1.73M2
GFR NON-AFRICAN AMERICAN: >60 ML/MIN/1.73M2
GLUCOSE BLD-MCNC: 110 MG/DL (ref 70–99)
HCT VFR BLD CALC: 39.2 % (ref 37–47)
HEMOGLOBIN: 13.8 GM/DL (ref 12.5–16)
LYMPHOCYTES ABSOLUTE: 1 K/CU MM
LYMPHOCYTES RELATIVE PERCENT: 25.5 % (ref 24–44)
MCH RBC QN AUTO: 35.5 PG (ref 27–31)
MCHC RBC AUTO-ENTMCNC: 35.2 % (ref 32–36)
MCV RBC AUTO: 100.8 FL (ref 78–100)
MONOCYTES ABSOLUTE: 0.5 K/CU MM
MONOCYTES RELATIVE PERCENT: 11.9 % (ref 0–4)
PDW BLD-RTO: 12.3 % (ref 11.7–14.9)
PLATELET # BLD: 178 K/CU MM (ref 140–440)
PMV BLD AUTO: 10 FL (ref 7.5–11.1)
POTASSIUM SERPL-SCNC: 3.2 MMOL/L (ref 3.5–5.1)
RBC # BLD: 3.89 M/CU MM (ref 4.2–5.4)
SEGMENTED NEUTROPHILS ABSOLUTE COUNT: 2.4 K/CU MM
SEGMENTED NEUTROPHILS RELATIVE PERCENT: 59.6 % (ref 36–66)
SODIUM BLD-SCNC: 142 MMOL/L (ref 135–145)
TOTAL PROTEIN: 6.2 GM/DL (ref 6.4–8.2)
WBC # BLD: 4 K/CU MM (ref 4–10.5)

## 2020-09-24 PROCEDURE — 2580000003 HC RX 258: Performed by: INTERNAL MEDICINE

## 2020-09-24 PROCEDURE — 99214 OFFICE O/P EST MOD 30 MIN: CPT | Performed by: INTERNAL MEDICINE

## 2020-09-24 PROCEDURE — 99211 OFF/OP EST MAY X REQ PHY/QHP: CPT

## 2020-09-24 PROCEDURE — 6360000002 HC RX W HCPCS: Performed by: INTERNAL MEDICINE

## 2020-09-24 PROCEDURE — 36591 DRAW BLOOD OFF VENOUS DEVICE: CPT

## 2020-09-24 PROCEDURE — 85025 COMPLETE CBC W/AUTO DIFF WBC: CPT

## 2020-09-24 PROCEDURE — 80053 COMPREHEN METABOLIC PANEL: CPT

## 2020-09-24 RX ORDER — HEPARIN SODIUM (PORCINE) LOCK FLUSH IV SOLN 100 UNIT/ML 100 UNIT/ML
500 SOLUTION INTRAVENOUS PRN
Status: CANCELLED | OUTPATIENT
Start: 2020-09-24

## 2020-09-24 RX ORDER — SODIUM CHLORIDE 0.9 % (FLUSH) 0.9 %
10 SYRINGE (ML) INJECTION PRN
Status: CANCELLED | OUTPATIENT
Start: 2020-09-24

## 2020-09-24 RX ORDER — HEPARIN SODIUM (PORCINE) LOCK FLUSH IV SOLN 100 UNIT/ML 100 UNIT/ML
500 SOLUTION INTRAVENOUS PRN
Status: DISCONTINUED | OUTPATIENT
Start: 2020-09-24 | End: 2020-09-25 | Stop reason: HOSPADM

## 2020-09-24 RX ORDER — ANASTROZOLE 1 MG/1
1 TABLET ORAL EVERY OTHER DAY
Qty: 90 TABLET | Refills: 5 | Status: SHIPPED | OUTPATIENT
Start: 2020-09-24 | End: 2021-02-19

## 2020-09-24 RX ORDER — SODIUM CHLORIDE 0.9 % (FLUSH) 0.9 %
20 SYRINGE (ML) INJECTION PRN
Status: DISCONTINUED | OUTPATIENT
Start: 2020-09-24 | End: 2020-09-25 | Stop reason: HOSPADM

## 2020-09-24 RX ORDER — SODIUM CHLORIDE 0.9 % (FLUSH) 0.9 %
20 SYRINGE (ML) INJECTION PRN
Status: CANCELLED | OUTPATIENT
Start: 2020-09-24

## 2020-09-24 RX ADMIN — Medication 500 UNITS: at 10:12

## 2020-09-24 RX ADMIN — SODIUM CHLORIDE, PRESERVATIVE FREE 20 ML: 5 INJECTION INTRAVENOUS at 10:12

## 2020-09-24 ASSESSMENT — PAIN SCALES - GENERAL: PAINLEVEL_OUTOF10: 4

## 2020-09-24 NOTE — PROGRESS NOTES
Patient ambulated to treatment room with assist of walker. Gait steady. Denies pain. Left VAD accessed. Flushed with 50cc normal saline prior to getting any blood return. Labs drawn from port without any further difficulty. Taken for exam with Dr. Heidi Gonzalez.

## 2020-09-24 NOTE — PROGRESS NOTES
MA Rooming Questions  Patient: Mando Jung  MRN: [id number]    Date: 9/24/2020        1. Do you have any new issues? yes - has a mass behind right ear         2. Do you need any refills on medications?    no    3. Have you had any imaging done since your last visit?   no    4. Have you been hospitalized or seen in the emergency room since your last visit here?   no    5. Did the patient have a depression screening completed today?  No    No data recorded     PHQ-9 Given to (if applicable):               PHQ-9 Score (if applicable):                     [] Positive     []  Negative              Does question #9 need addressed (if applicable)                     [] Yes    []  No               Lesia Coffey MA

## 2020-09-24 NOTE — PROGRESS NOTES
Nilson Jesus Name: Kayla Ennis  Patient : 1945  Patient MRN: [id number]     Primary Oncologist: Ascencion Maldonado MD  PCP:Dr Marcus Allen  Surgeon Dr Myla Orta     Date of Service: 20     Chief Complaint:   Chief Complaint   Patient presents with    Follow-up        Active Problem list   Breast cancer    HPI  1. Mrs. Yara Street ( 45) has a history of right breast cancer, for which she had modified radical mastectomy in . She had a couple of local recurrences, treated with chemotherapy and radiation therapy and excision of the chest wall recurrences. 2. In  she had a total abdominal hysterectomy/bilateral salpingo-oophorectomy, which was done as part of hormonal therapy and Tamoxifen for one year. After that she did not show any evidence of recurrence. 3. In  she presented with right axillary mass which was biopsied and confirmed to be an invasive carcinoma, morphologically consistent with origin from her breast primary. Her ER was positive 90 percent, DC positive. HER-2/naomi was negative by FISH. 4. All other w/u including CT of the chest, abdomen and pelvis showed no evidence of any intraabdominal mass or metastases. Bone scan was negative for any bony mets. Her MUGA scan showed 61 percent ejection fraction. 5. She was started on Taxol d1&8 [id number] for 6 courses starting in Dec of 2012 till 2013 and showed good partial response with mass size reduced to 2.4 cm in  from 3 cm in 2012 and completed 6 courses in 2013. Right Ax mass completely resolved over next couple years. 6. In May 2013 she was initiated on Hormonal therapy with Arimidex 1 mg daily for maintenance treatment. 7. In continued Clinical remission. 2017 Hemoglobin 14.3, hematocrit 42, white count 5,900, platelet 634,219, BUN 17, creatinine 0.7. Sodium was 141, potassium 3.4, chloride 95, calcium 10.0. The rest of the chemistries are normal. LFTs normal, , uric acid 6.0. CEA 1.6, CA 27 14. x-ray: 2015 No acute cardiopulmonary findings. ,  8. Mammogram of the left breast was negative for any lesions. PAST SURGICAL HISTORY:   1. History of breast cancer as listed above. 2. Squamous cell cancer removed from the leg in . 3. Cholecystectomy in . 4. Heel spur surgery .,  5. Right Total knee replacement in 2011.  6. Lesion removed from her left arm in . 7. Joint replacement in her thumb in May of 2012. 8. Cystic lesions removed by Dr Norman Alcaraz from her finger Sep 2014. 9. ear surgery 2015     FAMILY HISTORY:   Both father and mother had coronary artery disease. There is also a history of cancer of the colon and liver in the family. Sister had hepatitis C.     SOCIAL HISTORY:   She has never smoked. She is a nondrinker. She is  since . Her   of a heart attack. 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 Interval History   20 she arrived alone to the clinic today. Reported that the surgical site has completely healed. Reported bilateral preauricular pain. Hearing def bilaterally. Tinnitus. Reported that she was told that there is a mass behind the rt ear and was recommended surgery. She is being referred to [address] [place] [address]. No fever or any cough. Denied any chest pain, increased shortness of breath, palpitations or dizziness. Reported that she is compliant with anastrozole and is tolerating fairly well other than muscle and joint pains. Denied any abdominal pain, nausea, vomiting,constipation. Chronic intermittent diarrhea, raw apple helps. Denied any  symptoms. LE edema has resolved after she started using a compressor. On lasix too.      Review of Systems  Per interval history; otherwise 10 point ROS is negative      Vital Signs:  BP (!) 143/70 (Site: Left Upper Arm, Position: Sitting, Cuff Size: Large Adult)   Pulse 75   Temp 97.4 °F (36.3 °C) (Infrared)   Resp 16   Ht 5' 5\" (1.651 m)   Wt 215 lb (97.5 kg)   SpO2 97%   BMI 35.78 kg/m²     Physical Exam:     CONSTITUTIONAL: awake, alert, obese, ambulates with a walker   EYES: mahsa, no palor or any icetrus   ENT: ATNC   NECK: no JVD   HEMATOLOGIC/LYMPHATIC: no cervical, supraclavicular or axillary lymphadenopathy   LUNGS: ctab  Breast; Rt mastectomy.  Right axillary scar very well healed, no pain, no discharge  CARDIOVASCULAR: s1s2 rrr no murmurs   ABDOMEN: soft ntnd bs pos  NEUROLOGIC:GI   SKIN: No rash   EXTREMITIES:Bilateral LE edema 2-3+      Labs:    Hematology:  Lab Results   Component Value Date    WBC 4.0 09/24/2020    RBC 3.89 (L) 09/24/2020    HGB 13.8 09/24/2020    HCT 39.2 09/24/2020    .8 (H) 09/24/2020    MCH 35.5 (H) 09/24/2020    MCHC 35.2 09/24/2020    RDW 12.3 09/24/2020     09/24/2020    MPV 10.0 09/24/2020    BANDSPCT 2 (L) 11/21/2017    SEGSPCT 59.6 09/24/2020    EOSRELPCT 2.5 09/24/2020    BASOPCT 0.5 09/24/2020    LYMPHOPCT 25.5 09/24/2020    MONOPCT 11.9 (H) 09/24/2020    BANDABS 0.10 11/21/2017    SEGSABS 2.4 09/24/2020    EOSABS 0.1 09/24/2020    BASOSABS 0.0 09/24/2020    LYMPHSABS 1.0 09/24/2020    MONOSABS 0.5 09/24/2020    DIFFTYPE AUTOMATED DIFFERENTIAL 09/24/2020    POLYCHROM 1+ 12/12/2016    PLTM FEW 12/12/2016     Lab Results   Component Value Date    ESR 12 03/08/2017       Chemistry:  Lab Results   Component Value Date     07/06/2020    K 3.3 (L) 07/06/2020    CL 98 (L) 07/06/2020    CO2 32 07/06/2020    BUN 20 07/06/2020    CREATININE 0.8 07/06/2020    GLUCOSE 113 (H) 07/06/2020    CALCIUM 10.2 07/06/2020    PROT 7.3 07/06/2020    LABALBU 4.8 07/06/2020    BILITOT 0.4 07/06/2020    ALKPHOS 71 07/06/2020    AST 19 07/06/2020    ALT 18 07/06/2020    LABGLOM >60 07/06/2020    GFRAA >60 07/06/2020    PHOS 1.9 (L) 09/09/2019    MG 2.2 09/09/2019     Lab Results   Component Value Date     10/17/2017     No components found for: LD  Lab Results   Component Value Date    TSHHS 1.190 04/27/2017    T4FREE 1.25 04/27/2017    FT3 3.2 04/27/2017       Immunology:  Lab Results   Component Value Date    PROT 7.3 07/06/2020     No results found for: Miles Pal, KLFLCR  No results found for: B2M    Coagulation Panel:  Lab Results   Component Value Date    PROTIME 10.4 04/27/2017    INR 0.91 04/27/2017    APTT 23.8 04/27/2017    DDIMER 919 (H) 12/12/2016       Anemia Panel:  Lab Results   Component Value Date    ONVBVIAR75 331 07/18/2011       Tumor Markers:  Lab Results   Component Value Date    CEA 2.1 01/22/2019    LABCA2 13.9 07/06/2020         Imaging: Reviewed     Pathology:Reviewed     Observations:  Performance Status: ECOG 2   Depression Status: PHQ 9 neg       Assessment & Plan:  1. Ca Breast 1972 with Right Ax Recurrence Nov 2012   Initial diagnosis in 1972 when she had right modified radical mastectomy. She had isolated local recurrences in the early eighties, for which she had excision, followed by radiation therapy and hormonal therapy. After being off treatment for many years, she had another recurrence in November of 2012 and after excision, she had minimal residual disease and has been on maintenance Arimidex for five years after initial chemotherapy with a taxane. Dr Lit Combs recommendation was to keep her on hormonal therapy for at least 10 years or even longer. Has been off of AI intermittently for bone pain, last time since feb 2020. Resumed Luma 15 2020   PET with avid right axillary mass, biopsy-proven invasive ductal carcinoma DC ER positive DC negative and HER-2 negative. No other metastatic disease. S/P surgical resection  Will follow up on pathology. If margins negative will recommend continuation of AI, if not will see if XRt is a possibility. Is not very enthusiastic about any systemic therapy  Continue AI and monitor for AE. Dexa scan 11/2017 with osteopenia. Recommend continuation of calcium and vitamin D.  Repeat dexa scan pending, scheduled for October 2020    YUMIKO edema:Compressor helping . Is on lasix, Compression stockings and also recommend feet elevation    cholesteotoma? ??: follow with [facility]    Mild hypokalemia: CMP pending. Recommend OTC K supplements. Macrocytosis without anemia: Continue to monitor    Continue other medical care    Discussed the above findings and plan with her and she verbalized understanding    Discussed Lifestyle, healthy diet and regular exercise as tolerated. Also discussed importance of being up-to-date with age-appropriate screening tools, Left mammogram due again feb 2021. Recommend follow-up with primary care physician and other specialists. Please do not hesitate to contact us if you need ay further information. Return to clinic in Jan 2021or earlier if new symptoms. I have recommended that the patient follow CDC guidelines for prevention of COVID-19 infection.     [id number]

## 2020-10-15 ENCOUNTER — TELEPHONE (OUTPATIENT)
Dept: ONCOLOGY | Age: 75
End: 2020-10-15

## 2020-10-16 NOTE — TELEPHONE ENCOUNTER
Patient called you back today, left a message stating taking calcium and vit D or wanting an Rx called her back to clarify if she wanted an Rx called or if she was going to get meds OTC, patient stated that she will just get the meds OTC

## 2020-12-14 ENCOUNTER — APPOINTMENT (OUTPATIENT)
Dept: CT IMAGING | Age: 75
End: 2020-12-14
Payer: MEDICARE

## 2020-12-14 ENCOUNTER — APPOINTMENT (OUTPATIENT)
Dept: GENERAL RADIOLOGY | Age: 75
End: 2020-12-14
Payer: MEDICARE

## 2020-12-14 ENCOUNTER — HOSPITAL ENCOUNTER (OUTPATIENT)
Age: 75
Setting detail: OBSERVATION
Discharge: HOME | End: 2020-12-15
Attending: EMERGENCY MEDICINE | Admitting: INTERNAL MEDICINE
Payer: MEDICARE

## 2020-12-14 ENCOUNTER — APPOINTMENT (OUTPATIENT)
Dept: MRI IMAGING | Age: 75
End: 2020-12-14
Payer: MEDICARE

## 2020-12-14 PROBLEM — R42 DIZZINESS: Status: ACTIVE | Noted: 2020-12-14

## 2020-12-14 LAB
ALBUMIN SERPL-MCNC: 4 GM/DL (ref 3.4–5)
ALP BLD-CCNC: 64 IU/L (ref 40–128)
ALT SERPL-CCNC: 24 U/L (ref 10–40)
ANION GAP SERPL CALCULATED.3IONS-SCNC: 16 MMOL/L (ref 4–16)
AST SERPL-CCNC: 26 IU/L (ref 15–37)
BACTERIA: NEGATIVE /HPF
BASOPHILS ABSOLUTE: 0 K/CU MM
BASOPHILS RELATIVE PERCENT: 0.5 % (ref 0–1)
BILIRUB SERPL-MCNC: 0.6 MG/DL (ref 0–1)
BILIRUBIN URINE: NEGATIVE MG/DL
BLOOD, URINE: NEGATIVE
BUN BLDV-MCNC: 21 MG/DL (ref 6–23)
CALCIUM SERPL-MCNC: 9.7 MG/DL (ref 8.3–10.6)
CHLORIDE BLD-SCNC: 99 MMOL/L (ref 99–110)
CHOLESTEROL: 217 MG/DL
CHP ED QC CHECK: YES
CLARITY: CLEAR
CO2: 25 MMOL/L (ref 21–32)
COLOR: YELLOW
CREAT SERPL-MCNC: 0.5 MG/DL (ref 0.6–1.1)
DIFFERENTIAL TYPE: ABNORMAL
EOSINOPHILS ABSOLUTE: 0.1 K/CU MM
EOSINOPHILS RELATIVE PERCENT: 2.3 % (ref 0–3)
GFR AFRICAN AMERICAN: >60 ML/MIN/1.73M2
GFR NON-AFRICAN AMERICAN: >60 ML/MIN/1.73M2
GLUCOSE BLD-MCNC: 117 MG/DL (ref 70–99)
GLUCOSE BLD-MCNC: 90 MG/DL
GLUCOSE BLD-MCNC: 90 MG/DL (ref 70–99)
GLUCOSE, URINE: NEGATIVE MG/DL
HCT VFR BLD CALC: 44 % (ref 37–47)
HDLC SERPL-MCNC: 64 MG/DL
HEMOGLOBIN: 14.2 GM/DL (ref 12.5–16)
IMMATURE NEUTROPHIL %: 0.9 % (ref 0–0.43)
INR BLD: 0.9 INDEX
KETONES, URINE: NEGATIVE MG/DL
LDL CHOLESTEROL DIRECT: 137 MG/DL
LEUKOCYTE ESTERASE, URINE: NEGATIVE
LV EF: 60 %
LVEF MODALITY: NORMAL
LYMPHOCYTES ABSOLUTE: 1.1 K/CU MM
LYMPHOCYTES RELATIVE PERCENT: 18.3 % (ref 24–44)
MAGNESIUM: 2 MG/DL (ref 1.8–2.4)
MCH RBC QN AUTO: 34.2 PG (ref 27–31)
MCHC RBC AUTO-ENTMCNC: 32.3 % (ref 32–36)
MCV RBC AUTO: 106 FL (ref 78–100)
MONOCYTES ABSOLUTE: 0.5 K/CU MM
MONOCYTES RELATIVE PERCENT: 9.4 % (ref 0–4)
MUCUS: ABNORMAL HPF
NITRITE URINE, QUANTITATIVE: NEGATIVE
NUCLEATED RBC %: 0 %
PDW BLD-RTO: 12.2 % (ref 11.7–14.9)
PH, URINE: 6 (ref 5–8)
PLATELET # BLD: 177 K/CU MM (ref 140–440)
PMV BLD AUTO: 9.5 FL (ref 7.5–11.1)
POTASSIUM SERPL-SCNC: 3.2 MMOL/L (ref 3.5–5.1)
PROTEIN UA: NEGATIVE MG/DL
PROTHROMBIN TIME: 10.9 SECONDS (ref 11.7–14.5)
RBC # BLD: 4.15 M/CU MM (ref 4.2–5.4)
RBC URINE: 1 /HPF (ref 0–6)
SEGMENTED NEUTROPHILS ABSOLUTE COUNT: 3.9 K/CU MM
SEGMENTED NEUTROPHILS RELATIVE PERCENT: 68.6 % (ref 36–66)
SODIUM BLD-SCNC: 140 MMOL/L (ref 135–145)
SPECIFIC GRAVITY UA: 1.02 (ref 1–1.03)
SQUAMOUS EPITHELIAL: 1 /HPF
TOTAL IMMATURE NEUTOROPHIL: 0.05 K/CU MM
TOTAL NUCLEATED RBC: 0 K/CU MM
TOTAL PROTEIN: 6.4 GM/DL (ref 6.4–8.2)
TRANSITIONAL EPITHELIAL: <1 /HPF
TRICHOMONAS: ABNORMAL /HPF
TRIGL SERPL-MCNC: 160 MG/DL
TROPONIN T: <0.01 NG/ML
UROBILINOGEN, URINE: NORMAL MG/DL (ref 0.2–1)
WBC # BLD: 5.7 K/CU MM (ref 4–10.5)
WBC UA: <1 /HPF (ref 0–5)

## 2020-12-14 PROCEDURE — 36415 COLL VENOUS BLD VENIPUNCTURE: CPT

## 2020-12-14 PROCEDURE — 93010 ELECTROCARDIOGRAM REPORT: CPT | Performed by: INTERNAL MEDICINE

## 2020-12-14 PROCEDURE — 96372 THER/PROPH/DIAG INJ SC/IM: CPT

## 2020-12-14 PROCEDURE — 83721 ASSAY OF BLOOD LIPOPROTEIN: CPT

## 2020-12-14 PROCEDURE — 2580000003 HC RX 258: Performed by: EMERGENCY MEDICINE

## 2020-12-14 PROCEDURE — 84484 ASSAY OF TROPONIN QUANT: CPT

## 2020-12-14 PROCEDURE — 85610 PROTHROMBIN TIME: CPT

## 2020-12-14 PROCEDURE — 2580000003 HC RX 258: Performed by: INTERNAL MEDICINE

## 2020-12-14 PROCEDURE — 70450 CT HEAD/BRAIN W/O DYE: CPT

## 2020-12-14 PROCEDURE — G0378 HOSPITAL OBSERVATION PER HR: HCPCS

## 2020-12-14 PROCEDURE — 80061 LIPID PANEL: CPT

## 2020-12-14 PROCEDURE — 93306 TTE W/DOPPLER COMPLETE: CPT

## 2020-12-14 PROCEDURE — 85025 COMPLETE CBC W/AUTO DIFF WBC: CPT

## 2020-12-14 PROCEDURE — 99285 EMERGENCY DEPT VISIT HI MDM: CPT

## 2020-12-14 PROCEDURE — 82962 GLUCOSE BLOOD TEST: CPT

## 2020-12-14 PROCEDURE — 70551 MRI BRAIN STEM W/O DYE: CPT

## 2020-12-14 PROCEDURE — 71045 X-RAY EXAM CHEST 1 VIEW: CPT

## 2020-12-14 PROCEDURE — 70498 CT ANGIOGRAPHY NECK: CPT

## 2020-12-14 PROCEDURE — 6370000000 HC RX 637 (ALT 250 FOR IP): Performed by: INTERNAL MEDICINE

## 2020-12-14 PROCEDURE — 80053 COMPREHEN METABOLIC PANEL: CPT

## 2020-12-14 PROCEDURE — 6360000004 HC RX CONTRAST MEDICATION: Performed by: EMERGENCY MEDICINE

## 2020-12-14 PROCEDURE — 81001 URINALYSIS AUTO W/SCOPE: CPT

## 2020-12-14 PROCEDURE — 93005 ELECTROCARDIOGRAM TRACING: CPT | Performed by: EMERGENCY MEDICINE

## 2020-12-14 PROCEDURE — 70496 CT ANGIOGRAPHY HEAD: CPT

## 2020-12-14 PROCEDURE — 83735 ASSAY OF MAGNESIUM: CPT

## 2020-12-14 PROCEDURE — 6360000002 HC RX W HCPCS: Performed by: INTERNAL MEDICINE

## 2020-12-14 RX ORDER — ANASTROZOLE 1 MG/1
1 TABLET ORAL EVERY OTHER DAY
Status: DISCONTINUED | OUTPATIENT
Start: 2020-12-14 | End: 2020-12-15 | Stop reason: HOSPADM

## 2020-12-14 RX ORDER — ONDANSETRON 2 MG/ML
4 INJECTION INTRAMUSCULAR; INTRAVENOUS EVERY 6 HOURS PRN
Status: DISCONTINUED | OUTPATIENT
Start: 2020-12-14 | End: 2020-12-15 | Stop reason: HOSPADM

## 2020-12-14 RX ORDER — POLYETHYLENE GLYCOL 3350 17 G/17G
17 POWDER, FOR SOLUTION ORAL DAILY PRN
Status: DISCONTINUED | OUTPATIENT
Start: 2020-12-14 | End: 2020-12-15 | Stop reason: HOSPADM

## 2020-12-14 RX ORDER — 0.9 % SODIUM CHLORIDE 0.9 %
500 INTRAVENOUS SOLUTION INTRAVENOUS ONCE
Status: COMPLETED | OUTPATIENT
Start: 2020-12-14 | End: 2020-12-14

## 2020-12-14 RX ORDER — PROMETHAZINE HYDROCHLORIDE 25 MG/1
12.5 TABLET ORAL EVERY 6 HOURS PRN
Status: DISCONTINUED | OUTPATIENT
Start: 2020-12-14 | End: 2020-12-15 | Stop reason: HOSPADM

## 2020-12-14 RX ORDER — SODIUM CHLORIDE 0.9 % (FLUSH) 0.9 %
10 SYRINGE (ML) INJECTION PRN
Status: DISCONTINUED | OUTPATIENT
Start: 2020-12-14 | End: 2020-12-15 | Stop reason: HOSPADM

## 2020-12-14 RX ORDER — ALLOPURINOL 100 MG/1
100 TABLET ORAL DAILY
Status: DISCONTINUED | OUTPATIENT
Start: 2020-12-14 | End: 2020-12-15 | Stop reason: HOSPADM

## 2020-12-14 RX ORDER — ATENOLOL 25 MG/1
12.5 TABLET ORAL DAILY
Status: DISCONTINUED | OUTPATIENT
Start: 2020-12-14 | End: 2020-12-15 | Stop reason: HOSPADM

## 2020-12-14 RX ORDER — SODIUM CHLORIDE 0.9 % (FLUSH) 0.9 %
10 SYRINGE (ML) INJECTION EVERY 12 HOURS SCHEDULED
Status: DISCONTINUED | OUTPATIENT
Start: 2020-12-14 | End: 2020-12-15 | Stop reason: HOSPADM

## 2020-12-14 RX ORDER — PANTOPRAZOLE SODIUM 40 MG/1
40 TABLET, DELAYED RELEASE ORAL EVERY MORNING
Status: DISCONTINUED | OUTPATIENT
Start: 2020-12-15 | End: 2020-12-15 | Stop reason: HOSPADM

## 2020-12-14 RX ORDER — ACETAMINOPHEN 650 MG/1
650 SUPPOSITORY RECTAL EVERY 6 HOURS PRN
Status: DISCONTINUED | OUTPATIENT
Start: 2020-12-14 | End: 2020-12-15 | Stop reason: HOSPADM

## 2020-12-14 RX ORDER — HYDROCHLOROTHIAZIDE 25 MG/1
25 TABLET ORAL DAILY
Status: DISCONTINUED | OUTPATIENT
Start: 2020-12-15 | End: 2020-12-14

## 2020-12-14 RX ORDER — POTASSIUM CHLORIDE 20 MEQ/1
40 TABLET, EXTENDED RELEASE ORAL ONCE
Status: COMPLETED | OUTPATIENT
Start: 2020-12-14 | End: 2020-12-14

## 2020-12-14 RX ORDER — ACETAMINOPHEN 325 MG/1
650 TABLET ORAL EVERY 6 HOURS PRN
Status: DISCONTINUED | OUTPATIENT
Start: 2020-12-14 | End: 2020-12-15 | Stop reason: HOSPADM

## 2020-12-14 RX ADMIN — POTASSIUM CHLORIDE 40 MEQ: 1500 TABLET, EXTENDED RELEASE ORAL at 15:15

## 2020-12-14 RX ADMIN — ENOXAPARIN SODIUM 40 MG: 100 INJECTION SUBCUTANEOUS at 15:16

## 2020-12-14 RX ADMIN — SODIUM CHLORIDE, PRESERVATIVE FREE 10 ML: 5 INJECTION INTRAVENOUS at 21:55

## 2020-12-14 RX ADMIN — IOPAMIDOL 75 ML: 755 INJECTION, SOLUTION INTRAVENOUS at 10:22

## 2020-12-14 RX ADMIN — ATENOLOL 12.5 MG: 25 TABLET ORAL at 15:18

## 2020-12-14 RX ADMIN — ANASTROZOLE 1 MG: 1 TABLET ORAL at 15:16

## 2020-12-14 RX ADMIN — SODIUM CHLORIDE 500 ML: 9 INJECTION, SOLUTION INTRAVENOUS at 11:41

## 2020-12-14 RX ADMIN — ALLOPURINOL 100 MG: 100 TABLET ORAL at 15:16

## 2020-12-14 ASSESSMENT — ENCOUNTER SYMPTOMS
RHINORRHEA: 0
COUGH: 0
BACK PAIN: 0
ABDOMINAL PAIN: 0
SORE THROAT: 0
VOMITING: 0
NAUSEA: 0
EYE PAIN: 0
EYE DISCHARGE: 0

## 2020-12-14 ASSESSMENT — PAIN DESCRIPTION - LOCATION: LOCATION: SHOULDER

## 2020-12-14 ASSESSMENT — PAIN DESCRIPTION - PAIN TYPE: TYPE: CHRONIC PAIN;ACUTE PAIN

## 2020-12-14 ASSESSMENT — PAIN SCALES - GENERAL: PAINLEVEL_OUTOF10: 6

## 2020-12-14 ASSESSMENT — PAIN DESCRIPTION - ORIENTATION: ORIENTATION: RIGHT;LEFT

## 2020-12-14 NOTE — PROGRESS NOTES
Medication History  [facility]    Patient Name: Ivory Tyson 1945     Medication history has been completed by: Bk Walker CPhT    Source(s) of information: patient and insurance claims     Primary Care Physician: Lionel Ribeiro MD     [facility]    Allergies as of 12/14/2020 - Review Complete 12/14/2020   Allergen Reaction Noted    Grass extracts [gramineae pollens]  07/20/2020    Adhesive tape Dermatitis and Rash 11/21/2011    Antivert [meclizine hcl] Rash 11/21/2011    Atorvastatin calcium Other (See Comments) 04/12/2012    Meclizine Rash 05/16/2018    Meclizine hcl Rash 11/21/2011    Monascus Christianbetina Oh went yeast Rash 05/19/2009    Piperazine Rash 07/20/2020        Prior to Admission medications    Medication Sig Start Date End Date Taking? Authorizing Provider   anastrozole (ARIMIDEX) 1 MG tablet Take 1 tablet by mouth every other day 9/24/20  Yes Mary Evans MD   POTASSIUM GLUCONATE PO Take 99 mg by mouth daily   Yes Historical Provider, MD   B Complex-C (SUPER B COMPLEX PO) Take 1 tablet by mouth daily   Yes Historical Provider, MD   allopurinol (ZYLOPRIM) 100 MG tablet Take 100 mg by mouth daily   Yes Historical Provider, MD   Cholecalciferol (D3 ADULT PO) Take 1 tablet by mouth daily   Yes Historical Provider, MD   furosemide (LASIX) 20 MG tablet Take 20 mg by mouth daily    Yes Historical Provider, MD   polyethyl glycol-propyl glycol 0.4-0.3 % (SYSTANE) 0.4-0.3 % ophthalmic solution 1 drop nightly. Yes Historical Provider, MD   atenolol (TENORMIN) 25 MG tablet Take 12.5 mg by mouth daily    Yes Historical Provider, MD   hydrochlorothiazide (HYDRODIURIL) 25 MG tablet Take 25 mg by mouth daily. Yes Historical Provider, MD   omeprazole (PRILOSEC) 20 MG capsule Take 20 mg by mouth daily.      Yes Historical Provider, MD loperamide (IMODIUM A-D) 2 MG tablet take 2 tablet by oral route after 1st loose stool and 1 tablet (2 mg) after each next bowel movement; do not exceed 16 mg in 24hrs    Historical Provider, MD   diphenhydrAMINE (BENADRYL) 25 MG tablet Take 25 mg by mouth 2 times daily    Historical Provider, MD   acetaminophen (TYLENOL 8 HOUR) 650 MG extended release tablet Take 650 mg by mouth 2 times daily    Historical Provider, MD   bismuth subsalicylate (PEPTO BISMOL) 262 MG/15ML suspension Take 15 mLs by mouth daily as needed. Historical Provider, MD     Medications removed from list (include reason, ex. noncompliance, medication cost, therapy complete etc.):   · Calcium acetate patient states she is not taking this  · Norco patient not taking  · Visine eye soln uses systane eye soln    Comments:  · Medication list reviewed with patient and insurance claims verified. · Patient reports she has taken no meds piror to ER visit. · Patient reports she takes the following OTC\"s zinc, co Q 10, calcium carbonate, and relief factor for cleansing.     To my knowledge the above medication history is accurate as of 12/14/2020 1:06 PM.   Lesley Catalan CPhT   12/14/2020 1:06 PM

## 2020-12-14 NOTE — ED NOTES
Pt to bathroom via wheelchair, clean catch urine obtained and sent to lab. Pt reports dizziness with movement. Pt reportedly has hx of vertigo. Limb precautions band placed on patients right wrist d/t hx of right mastectomy.  Pt able to transfer self to and from wheelchair to bed without assistance     Brandon Prince RN  12/14/20 [id number]

## 2020-12-14 NOTE — ED NOTES
Handoff report given to [facility] and handoff NIHSS completed with no changes noted.  Pt voices dizziness is getting better     Glo Ortega RN  12/14/20 [id number]

## 2020-12-14 NOTE — ED PROVIDER NOTES
[address] ENCOUNTER      Pt Name: Sixto Bergman  MRN: [id number]  [place] 1945  Date of evaluation: 12/14/2020  Provider: Theresa Kim MD    [address] [place] [address]       Chief Complaint   Patient presents with    Dizziness     dizziness started after sitting up in bed this morning hx of vertigo and feels similar but this episode is worse. states she does not think much about drinking liquids and has drank less than 1 bottle of water in over 24 hours.  Dysuria     7-10 days         HISTORY OF PRESENT ILLNESS      Sixto Bergman is a 76 y.o. female who presents to the emergency department  for   Chief Complaint   Patient presents with    Dizziness     dizziness started after sitting up in bed this morning hx of vertigo and feels similar but this episode is worse. states she does not think much about drinking liquids and has drank less than 1 bottle of water in over 24 hours.  Dysuria     7-10 days       51-year-old female presents with acute onset vertiginous dizziness. Symptoms were present upon her waking this morning. She reports her last known well was yesterday evening. She also endorses some trouble ambulating that she most fell today because of the dizziness. She does endorse a history of \"vertigo. \"  She denies any focal weakness or paresthesias. No trouble hearing or swallowing. No changes in her vision. She is not taking any symptomatic measures at home. Denies a history of stroke or TIA. She presents the emergency department alert and oriented answer questions appropriately. Stroke scale of 0. She does not have any chest pain abdominal pain. No nausea or vomiting. Denies any fever, chills or constitutional infectious symptoms. Given that she is within 24 hours of onset of her symptoms, stroke alert was called. Nursing Notes, Triage Notes & Vital Signs were reviewed. REVIEW OF SYSTEMS    (2-9 systems for level 4, 10 or more for level 5)     Review of Systems   Constitutional: Negative for chills and fever. HENT: Negative for congestion, rhinorrhea and sore throat. Eyes: Negative for pain and discharge. Respiratory: Negative for cough. Cardiovascular: Negative for chest pain and palpitations. Gastrointestinal: Negative for abdominal pain, nausea and vomiting. Endocrine: Negative for polydipsia and polyuria. Genitourinary: Negative for dysuria and flank pain. Musculoskeletal: Negative for back pain and neck pain. Skin: Negative for pallor and wound. Neurological: Positive for dizziness. Negative for facial asymmetry, light-headedness and headaches. Psychiatric/Behavioral: Negative for confusion. Except as noted above the remainder of the review of systems was reviewed and negative. PAST MEDICAL HISTORY     Past Medical History:   Diagnosis Date    Arthritis     Asthma     last flare up 5 yrs ago    Cancer [facility])     \"dx with right breast cancer 1972- tx with surgery and tx with chemo and radiation- then 2013 it came  back in the same breast- tx with chemo -follows with Dr Tonya Oakes 2020 and taking chemo again    Hiatal hernia     History of blood transfusion     History of external beam radiation therapy 1982    right chest wall    History of motion sickness     History of UTI     last one UTI    Hyperlipidemia     Hypertension     IBS (irritable bowel syndrome)     Lumbar stenosis     PONV (postoperative nausea and vomiting)     Reflux     Stress incontinence     Thyroid nodule     for bx 4/27/2017       Prior to Admission medications    Medication Sig Start Date End Date Taking?  Authorizing Provider   anastrozole (ARIMIDEX) 1 MG tablet Take 1 tablet by mouth every other day 9/24/20  Yes Jodi Mason MD   POTASSIUM GLUCONATE PO Take 99 mg by mouth daily   Yes Historical Provider, MD  Obesity, unspecified    Dizziness         SURGICAL HISTORY       Past Surgical History:   Procedure Laterality Date    APPENDECTOMY      took with the hyster    AXILLARY SURGERY Right 08/07/2020    right lateral chest wall/axillary mass excision per Dr. Sylvester Spurling at [address]  2004    COLONOSCOPY  2011    COLONOSCOPY  04/20/2017    Normal    DILATION AND CURETTAGE OF UTERUS  40+ yr ago    after miscarriage     FINGER SURGERY Left 2014    index finger - removed a nodule    FOREARM SURGERY Right 1998     fx  repair with pin placement    HAND TENDON SURGERY  2012    Tendon removed left arm placed left thumb    HEEL SPUR SURGERY Bilateral right    done in 2015 and right done 2013    HYSTERECTOMY  age 45    \"took ovaries and appendix also\"    JOINT REPLACEMENT  right    knee- done 2011    JOINT REPLACEMENT  left    knee- done 12/2016    MASTECTOMY, RADICAL Right 1972    took one lymph node and was positive    OTHER SURGICAL HISTORY  04/27/2017    Thyroid biopsy    OTHER SURGICAL HISTORY Right 1982    right axillary mass bx - breast CA recurrence    SKIN BIOPSY      TUNNELED VENOUS PORT PLACEMENT  2013    left side of chest         CURRENT MEDICATIONS       Previous Medications    ACETAMINOPHEN (TYLENOL 8 HOUR) 650 MG EXTENDED RELEASE TABLET    Take 650 mg by mouth 2 times daily    ALLOPURINOL (ZYLOPRIM) 100 MG TABLET    Take 100 mg by mouth daily    ANASTROZOLE (ARIMIDEX) 1 MG TABLET    Take 1 tablet by mouth every other day    ATENOLOL (TENORMIN) 25 MG TABLET    Take 12.5 mg by mouth daily     B COMPLEX-C (SUPER B COMPLEX PO)    Take 1 tablet by mouth daily    BISMUTH SUBSALICYLATE (PEPTO BISMOL) 262 MG/15ML SUSPENSION    Take 15 mLs by mouth daily as needed.       CHOLECALCIFEROL (D3 ADULT PO)    Take 1 tablet by mouth daily    DIPHENHYDRAMINE (BENADRYL) 25 MG TABLET    Take 25 mg by mouth 2 times daily    FUROSEMIDE (LASIX) 20 MG TABLET    Take 20 mg by mouth daily HYDROCHLOROTHIAZIDE (HYDRODIURIL) 25 MG TABLET    Take 25 mg by mouth daily. LOPERAMIDE (IMODIUM A-D) 2 MG TABLET    take 2 tablet by oral route after 1st loose stool and 1 tablet (2 mg) after each next bowel movement; do not exceed 16 mg in 24hrs    OMEPRAZOLE (PRILOSEC) 20 MG CAPSULE    Take 20 mg by mouth daily. POLYETHYL GLYCOL-PROPYL GLYCOL 0.4-0.3 % (SYSTANE) 0.4-0.3 % OPHTHALMIC SOLUTION    1 drop nightly. POTASSIUM GLUCONATE PO    Take 99 mg by mouth daily       ALLERGIES     Grass extracts [gramineae pollens], Adhesive tape, Antivert [meclizine hcl], Atorvastatin calcium, Meclizine, Meclizine hcl, Monascus purpureus went yeast, and Piperazine    FAMILY HISTORY       Family History   Problem Relation Age of Onset    Emphysema Mother     Heart Disease Mother     Heart Disease Father         MI    Liver Disease Sister     Depression Sister     Heart Disease Sister     Cancer Paternal Aunt     Cancer Paternal Uncle     Cancer Maternal Grandmother           SOCIAL HISTORY       Social History     Socioeconomic History    Marital status:       Spouse name: Not on file    Number of children: Not on file    Years of education: Not on file    Highest education level: Not on file   Occupational History    Not on file   Social Needs    Financial resource strain: Not on file    Food insecurity     Worry: Not on file     Inability: Not on file    Transportation needs     Medical: Not on file     Non-medical: Not on file   Tobacco Use    Smoking status: Never Smoker    Smokeless tobacco: Never Used   Substance and Sexual Activity    Alcohol use: No    Drug use: No    Sexual activity: Not on file   Lifestyle    Physical activity     Days per week: Not on file     Minutes per session: Not on file    Stress: Not on file   Relationships    Social connections     Talks on phone: Not on file     Gets together: Not on file     Attends [employer] service: Not on file Active member of club or organization: Not on file     Attends meetings of clubs or organizations: Not on file     Relationship status: Not on file    Intimate partner violence     Fear of current or ex partner: Not on file     Emotionally abused: Not on file     Physically abused: Not on file     Forced sexual activity: Not on file   Other Topics Concern    Not on file   Social History Narrative    Not on file       SCREENINGS   NIH Stroke Scale  NIH Stroke Scale Assessed: Yes  Interval: Baseline  Level of Consciousness (1a. ): Alert  LOC Questions (1b. ): Answers both correctly  LOC Commands (1c. ): Performs both tasks correctly  Best Gaze (2. ): Normal  Visual (3. ): No visual loss  Facial Palsy (4. ): Normal symmetrical movement  Motor Arm, Left (5a. ): No drift  Motor Arm, Right (5b. ): No drift  Motor Leg, Left (6a. ): No drift  Motor Leg, Right (6b. ): No drift  Limb Ataxia (7. ): Absent  Sensory (8. ): Normal  Best Language (9. ): No aphasia  Dysarthria (10. ): Normal  Extinction and Inattention (11): No abnormality  Total: 0Glasgow Coma Scale  Eye Opening: Spontaneous  Best Verbal Response: Oriented  Best Motor Response: Obeys commands  [place] Coma Scale Score: 15          PHYSICAL EXAM    (up to 7 for level 4, 8 or more for level 5)     ED Triage Vitals [12/14/20 0822]   BP Temp Temp Source Pulse Resp SpO2 Height Weight   133/65 98.4 °F (36.9 °C) Oral 81 16 99 % 5' 5.5\" (1.664 m) 202 lb (91.6 kg)       Physical Exam  Vitals signs reviewed. Constitutional:       Appearance: She is not ill-appearing or toxic-appearing. HENT:      Head: Normocephalic and atraumatic. Nose: No congestion or rhinorrhea. Mouth/Throat:      Mouth: Mucous membranes are moist.      Pharynx: No oropharyngeal exudate or posterior oropharyngeal erythema. Eyes:      General:         Right eye: No discharge. Left eye: No discharge. Extraocular Movements: Extraocular movements intact. Pupils: Pupils are equal, round, and reactive to light. Neck:      Musculoskeletal: Normal range of motion. No muscular tenderness. Cardiovascular:      Rate and Rhythm: Normal rate. Heart sounds: No friction rub. No gallop. Pulmonary:      Effort: Pulmonary effort is normal. No respiratory distress. Breath sounds: No stridor. Comments: Respirations unlabored  No retractions, no increased work of breathing  Abdominal:      Palpations: Abdomen is soft. Tenderness: There is no abdominal tenderness. There is no guarding. Comments: Abdomen soft, non-tender, non-peritoneal  No guarding on abdominal exam   Musculoskeletal:         General: No swelling or tenderness. Comments: Extremities nontraumatic   Lymphadenopathy:      Cervical: No cervical adenopathy. Skin:     General: Skin is warm. Capillary Refill: Capillary refill takes less than 2 seconds. Findings: No erythema, lesion or rash. Neurological:      Mental Status: She is alert and oriented to person, place, and time. Comments: Speech clear, face symmetric, equal strength and sensation in bilateral lower extremities, no pronator drift, cerebellar testing within normal limits, negative test of skew.   Does complain of vertiginous dizziness         DIAGNOSTIC RESULTS     Labs Reviewed   CBC WITH AUTO DIFFERENTIAL - Abnormal; Notable for the following components:       Result Value    RBC 4.15 (*)     .0 (*)     MCH 34.2 (*)     Segs Relative 68.6 (*)     Lymphocytes % 18.3 (*)     Monocytes % 9.4 (*)     Immature Neutrophil % 0.9 (*)     All other components within normal limits   COMPREHENSIVE METABOLIC PANEL W/ REFLEX TO MG FOR LOW K - Abnormal; Notable for the following components:    Potassium 3.2 (*)     CREATININE 0.5 (*)     Glucose 117 (*)     All other components within normal limits   PROTIME-INR - Abnormal; Notable for the following components:    Protime 10.9 (*) All other components within normal limits   URINALYSIS - Abnormal; Notable for the following components:    Mucus, UA RARE (*)     All other components within normal limits   LIPID PANEL - Abnormal; Notable for the following components:    Triglycerides 160 (*)     Cholesterol 217 (*)     LDL Direct 137 (*)     All other components within normal limits   POCT GLUCOSE - Normal   TROPONIN   MAGNESIUM   POCT GLUCOSE              RADIOLOGY:     Non-plain film images such as CT, Ultrasound and MRI are read by the radiologist. Plain radiographic images are visualized and preliminarily interpreted by the emergency physician. Interpretation per the Radiologist below, if available at the time of this note:    XR CHEST PORTABLE   Final Result   1. No active pulmonary disease. CTA HEAD W CONTRAST   Final Result   No acute abnormality or flow-limiting stenosis of the major arteries of the   head and neck. CTA NECK W CONTRAST   Final Result   No acute abnormality or flow-limiting stenosis of the major arteries of the   head and neck. CT HEAD WO CONTRAST   Final Result   No acute intracranial abnormality. Mild chronic microvascular disease. 3 mm calcific density at the anterior aspect of the 3rd ventricle, may be   secondary to calcification related to choroid plexus versus calcified colloid   cyst.  No hydrocephalus. Partial opacification of the right sphenoid sinus. Mild right mastoid   effusion. Partial opacification of the right middle ear cavity. Results reported to Dr. Tomas Pizano at 10:05 a.m. on December 14, 2020.          MRI BRAIN WO CONTRAST    (Results Pending)         ED BEDSIDE ULTRASOUND:   Performed by ED Physician Patricia Fischer MD       LABS:  Labs Reviewed   CBC WITH AUTO DIFFERENTIAL - Abnormal; Notable for the following components:       Result Value    RBC 4.15 (*)     .0 (*)     MCH 34.2 (*)     Segs Relative 68.6 (*) Lymphocytes % 18.3 (*)     Monocytes % 9.4 (*)     Immature Neutrophil % 0.9 (*)     All other components within normal limits   COMPREHENSIVE METABOLIC PANEL W/ REFLEX TO MG FOR LOW K - Abnormal; Notable for the following components:    Potassium 3.2 (*)     CREATININE 0.5 (*)     Glucose 117 (*)     All other components within normal limits   PROTIME-INR - Abnormal; Notable for the following components:    Protime 10.9 (*)     All other components within normal limits   URINALYSIS - Abnormal; Notable for the following components:    Mucus, UA RARE (*)     All other components within normal limits   LIPID PANEL - Abnormal; Notable for the following components:    Triglycerides 160 (*)     Cholesterol 217 (*)     LDL Direct 137 (*)     All other components within normal limits   POCT GLUCOSE - Normal   TROPONIN   MAGNESIUM   POCT GLUCOSE       All other labs were within normal range or not returned as of this dictation.     EMERGENCY DEPARTMENT COURSE and DIFFERENTIAL DIAGNOSIS/MDM:   Vitals:    Vitals:    12/14/20 1031 12/14/20 1142 12/14/20 1203 12/14/20 1302   BP: (!) 147/102 (!) 150/75 137/67 128/74   Pulse: 99 98 99 96   Resp: 8 16 15 18   Temp:       TempSrc:       SpO2: 99% 97% 98%    Weight:       Height:               MDM  Number of Diagnoses or Management Options  Dizziness

## 2020-12-14 NOTE — ED NOTES
Bed: ED-14  Expected date: 12/14/20  Expected time:   Means of arrival: [facility]  Comments:     Thad Henning.  LESTER Richey  12/14/20 [id number]

## 2020-12-14 NOTE — CODE DOCUMENTATION
LNW 10 pm last night. Pt reports she went to bed and felt fine and when she woke up this morning and sat up in bed she noticed she was dizzy. Pt reports room dizzy with movement and has hx of vertigo.  Pt also states she has some burning with urination the past 7-10 days

## 2020-12-14 NOTE — ED NOTES
Dr Lacie Oppenheim from [address] [place] [address] on telestroke robot for patient assessment.      Bryanna Reese RN  12/14/20 [id number]

## 2020-12-14 NOTE — H&P
History and Physical      Name:  Diana Boyd /Age/Sex: 1945  (76 y.o. female)   MRN & CSN:  [id number] & [id number] Admission Date/Time: 2020  8:11 AM   Location:  [facility]14 PCP: Dewey Mckeon MD       Hospital Day: 1        Admitting Physician: Dr. Elisabeth Hernández and Plan:   Diana Boyd is a 76 y.o. female who presents with  Dizziness and Dysuria     Stroke-like symptoms-presents with dizziness. Stroke alert activated. LKW ~2200 2020. Non candidate for TPA. See ED provider note for timeline. Admit gold Med/Surg   Neuro checks/ NIHSS   Telemetry    MRI pending   Neurology consult per clinical course   ASA/Statin on medication regimen   Swallow evaluation    PT/OT     Hypokalemia (3.2) replacement initiated in ED. Trend labs     Essential hypertension- continue home antihypertensive regimen- Monitor BP trends. Hx of Pauly        Patient case discussed with ED provider    Diet DIET CARDIAC;   DVT Prophylaxis [x] Lovenox, []  Heparin, [] SCDs, [] Ambulation  [] Long term AC   GI Prophylaxis [x] PPI,  [] H2 Blocker,  [] Carafate,  [] Diet/Tube Feeds   Code Status Full     Disposition Admit to OBS. Patient plans to return home upon discharge   MDM [] Low, [x] Moderate,[]  High     -Patient assessment and plan discussed and reviewed with admitting physician: Cornel Urbina MD.     History of Present Illness:     Chief Complaint: Dizziness and Dysuria (7-10 days)    Diana Boyd is a 76 y.o. female who presents with dizziness. The patient reports severe symptoms. Onset was this morning upon waking. LKW ~2200 2020. Reports some disorientation which prompted ER evaluation. Symptoms are consistent with hx of vertigo but more severe and worse with exertion.  She is also concerned about dehydration states minimal fluid intake in past 24hrs and reports associated dysuria for > 1 week     Stroke alert activated during ER evaluation Ten point ROS: reviewed negative, unless as noted in above HPI. Objective:   No intake or output data in the 24 hours ending 12/14/20 1301     Vitals:   Vitals:    12/14/20 1012 12/14/20 1031 12/14/20 1142 12/14/20 1203   BP: (!) 150/62 (!) 147/102 (!) 150/75 137/67   Pulse: 89 99 98 99   Resp: 21 8 16 15   Temp:       TempSrc:       SpO2: 97% 99% 97% 98%   Weight:       Height:           Physical Exam: 12/14/20     GEN -Awake nontoxic appearing female, sitting upright in bed , NAD. obese body habitus. Appears given age. EYES -PERRLA. No scleral erythema, discharge, or conjunctivitis. HENT -MM are moist. Oral pharynx without exudates, no evidence of thrush. NECK -Supple, no apparent thyromegaly or masses. RESP -CTA, no wheezes, rales or rhonchi. Symmetric chest movement while on RA.   C/V -S1/S2 auscultated. RRR without appreciable M/R/G. No JVD or carotid bruits. Peripheral pulses equal bilaterally and palpable. Cap refill <3 sec. No peripheral edema. GI -Abdomen is soft non distended and without significant TTP. + BS. No masses or guarding. Rectal exam deferred. No HSM   -No CVA/ flank tenderness. Levin catheter is not present. LYMPH-No palpable cervical lymphadenopathy and no hepatosplenomegaly. No petechiae or ecchymoses. MS -No gross joint deformities. SKIN -Normal coloration, warm, dry. NEURO-Cranial nerves appear grossly intact, No focal deficits, normal speech, no lateralizing weakness. PSYC-Awake, alert, oriented x 4- person, place, time, situation,  Appropriate affect.     Past Medical History:      Past Medical History:   Diagnosis Date    Arthritis     Asthma     last flare up 5 yrs ago    Cancer [facility])     \"dx with right breast cancer 1972- tx with surgery and tx with chemo and radiation- then 2013 it came  back in the same breast- tx with chemo -follows with Dr Liana Lee 2020 and taking chemo again    Hiatal hernia     History of blood transfusion  History of external beam radiation therapy 1982    right chest wall    History of motion sickness     History of UTI     last one UTI    Hyperlipidemia     Hypertension     IBS (irritable bowel syndrome)     Lumbar stenosis     PONV (postoperative nausea and vomiting)     Reflux     Stress incontinence     Thyroid nodule     for bx 4/27/2017       Past Surgical  History:    has a past surgical history that includes Mastectomy, radical (Right, 1972); Heel spur surgery (Bilateral, right); Forearm surgery (Right, 1998); skin biopsy; Hand tendon surgery (2012); Tunneled venous port placement (2013); Appendectomy; Hysterectomy (age 45); Cholecystectomy (2004); Colonoscopy (2011); Colonoscopy (04/20/2017); Dilation and curettage of uterus (40+ yr ago); Finger surgery (Left, 2014); other surgical history (04/27/2017); joint replacement (right); joint replacement (left); other surgical history (Right, 1982); and Axillary Surgery (Right, 08/07/2020). Social History:    FAM HX: Reviewed  family history includes Cancer in her maternal grandmother, paternal aunt, and paternal uncle; Depression in her sister; Emphysema in her mother; Heart Disease in her father, mother, and sister; Liver Disease in her sister. Soc HX:   Social History     Socioeconomic History    Marital status:       Spouse name: Not on file    Number of children: Not on file    Years of education: Not on file    Highest education level: Not on file   Occupational History    Not on file   Social Needs    Financial resource strain: Not on file    Food insecurity     Worry: Not on file     Inability: Not on file    Transportation needs     Medical: Not on file     Non-medical: Not on file   Tobacco Use    Smoking status: Never Smoker    Smokeless tobacco: Never Used   Substance and Sexual Activity    Alcohol use: No    Drug use: No    Sexual activity: Not on file   Lifestyle    Physical activity Days per week: Not on file     Minutes per session: Not on file    Stress: Not on file   Relationships    Social connections     Talks on phone: Not on file     Gets together: Not on file     Attends Nondenominational service: Not on file     Active member of club or organization: Not on file     Attends meetings of clubs or organizations: Not on file     Relationship status: Not on file    Intimate partner violence     Fear of current or ex partner: Not on file     Emotionally abused: Not on file     Physically abused: Not on file     Forced sexual activity: Not on file   Other Topics Concern    Not on file   Social History Narrative    Not on file     TOBACCO:   reports that she has never smoked. She has never used smokeless tobacco.  ETOH:   reports no history of alcohol use. Drugs:  reports no history of drug use. Allergies: Allergies   Allergen Reactions    Grass Extracts [Gramineae Pollens]     Adhesive Tape Dermatitis and Rash    Antivert [Meclizine Hcl] Rash    Atorvastatin Calcium Other (See Comments)     Other reaction(s): joint pain    Meclizine Rash     Rash    Meclizine Hcl Rash    Monascus Purpureus Went Yeast Rash     Other reaction(s): rash    Piperazine Rash       Home Medications:     Prior to Admission medications    Medication Sig Start Date End Date Taking?  Authorizing Provider   anastrozole (ARIMIDEX) 1 MG tablet Take 1 tablet by mouth every other day 9/24/20   Isabel Cheng MD   HYDROcodone-acetaminophen [facility]) 5-325 MG per tablet  8/7/20   Historical Provider, MD   loperamide (IMODIUM A-D) 2 MG tablet take 2 tablet by oral route after 1st loose stool and 1 tablet (2 mg) after each next bowel movement; do not exceed 16 mg in 24hrs    Historical Provider, MD   POTASSIUM GLUCONATE PO Take 99 mg by mouth daily    Historical Provider, MD   B Complex-C (SUPER B COMPLEX PO) Take 1 tablet by mouth daily    Historical Provider, MD allopurinol (ZYLOPRIM) 100 MG tablet Take 100 mg by mouth daily    Historical Provider, MD   diphenhydrAMINE (BENADRYL) 25 MG tablet Take 25 mg by mouth 2 times daily    Historical Provider, MD   acetaminophen (TYLENOL 8 HOUR) 650 MG extended release tablet Take 650 mg by mouth 2 times daily    Historical Provider, MD   Cholecalciferol (D3 ADULT PO) Take 1 tablet by mouth daily    Historical Provider, MD   Calcium Acetate, Phos Binder, (CALCIUM ACETATE PO) Take 1 tablet by mouth daily    Historical Provider, MD   FUROSEMIDE PO Take 20 mg by mouth 3 times daily as needed     Historical Provider, MD   naphazoline-pheniramine (VISINE-A) 0.025-0.3 % ophthalmic solution Place 1 drop into both eyes 2 times daily     Historical Provider, MD   polyethyl glycol-propyl glycol 0.4-0.3 % (SYSTANE) 0.4-0.3 % ophthalmic solution 1 drop nightly. Historical Provider, MD   atenolol (TENORMIN) 25 MG tablet Take 25 mg by mouth daily. Historical Provider, MD   hydrochlorothiazide (HYDRODIURIL) 25 MG tablet Take 25 mg by mouth daily. Historical Provider, MD   omeprazole (PRILOSEC) 20 MG capsule Take 20 mg by mouth daily. Historical Provider, MD   bismuth subsalicylate (PEPTO BISMOL) 262 MG/15ML suspension Take 15 mLs by mouth daily as needed.       Historical Provider, MD         Medications:   Medications:    allopurinol  100 mg Oral Daily    anastrozole  1 mg Oral Every Other Day    atenolol  12.5 mg Oral Daily    [START ON 12/15/2020] hydroCHLOROthiazide  25 mg Oral Daily    [START ON 12/15/2020] omeprazole  20 mg Oral QAM    sodium chloride flush  10 mL Intravenous 2 times per day    enoxaparin  40 mg Subcutaneous Daily    potassium chloride  40 mEq Oral Once      Infusions:   PRN Meds:     sodium chloride flush, 10 mL, PRN      promethazine, 12.5 mg, Q6H PRN    Or      ondansetron, 4 mg, Q6H PRN      polyethylene glycol, 17 g, Daily PRN      acetaminophen, 650 mg, Q6H PRN    Or   acetaminophen, 650 mg, Q6H PRN        Data:     Laboratory this visit:  Reviewed  Recent Labs     12/14/20 0928   WBC 5.7   HGB 14.2   HCT 44.0         Recent Labs     12/14/20 0928      K 3.2*   CL 99   CO2 25   BUN 21   CREATININE 0.5*     Recent Labs     12/14/20 0928   AST 26   ALT 24   BILITOT 0.6   ALKPHOS 64     Recent Labs     12/14/20 0928   INR 0.90       Radiology this visit:  Reviewed.     Ct Head Wo Contrast    Result Date: 12/14/2020 EXAMINATION: CT OF THE HEAD WITHOUT CONTRAST  12/14/2020 9:52 am TECHNIQUE: CT of the head was performed without the administration of intravenous contrast. Dose modulation, iterative reconstruction, and/or weight based adjustment of the mA/kV was utilized to reduce the radiation dose to as low as reasonably achievable. COMPARISON: None. HISTORY: ORDERING SYSTEM PROVIDED HISTORY: dizziness TECHNOLOGIST PROVIDED HISTORY: Has a \"code stroke\" or \"stroke alert\" been called? ->Yes Reason for exam:->dizziness Reason for Exam: dizziness Acuity: Acute Type of Exam: Initial FINDINGS: BRAIN/VENTRICLES: There is periventricular white matter low attenuation, likely related to mild chronic microvascular disease. There is no acute intracranial hemorrhage, mass effect or midline shift. No abnormal extra-axial fluid collection. The gray-white differentiation is maintained without evidence of an acute infarct. There is 3 mm calcific density at the anterior aspect of the 3rd ventricle, may be secondary to calcification related to choroid plexus versus calcified colloid cyst.  There is no hydrocephalus. ORBITS: The visualized portion of the orbits demonstrate no acute abnormality. SINUSES: There is partial opacification of the right sphenoid sinus. There is mild effusion in the right mastoid air cells. There is partial opacification of the right middle ear cavity. The remainder of the visualized paranasal sinuses and mastoid air cells demonstrate no acute abnormality. SOFT TISSUES/SKULL:  No acute abnormality of the visualized skull or soft tissues. No acute intracranial abnormality. Mild chronic microvascular disease. 3 mm calcific density at the anterior aspect of the 3rd ventricle, may be secondary to calcification related to choroid plexus versus calcified colloid cyst.  No hydrocephalus. Partial opacification of the right sphenoid sinus. Mild right mastoid effusion. Partial opacification of the right middle ear cavity. Results reported to Dr. Rasheed Rodriguez at 10:05 a.m. on December 14, 2020. Xr Chest Portable    Result Date: 12/14/2020  EXAMINATION: ONE XRAY VIEW OF THE CHEST 12/14/2020 10:06 am COMPARISON: None. HISTORY: ORDERING SYSTEM PROVIDED HISTORY: dizziness TECHNOLOGIST PROVIDED HISTORY: Reason for exam:->dizziness Reason for Exam: DIZZNESS Acuity: Unknown Type of Exam: Unknown FINDINGS: The heart size is within normal limits. The pulmonary vasculature is also within normal limits. No acute infiltrates are seen. The costophrenic angles are sharp bilaterally. No pneumothoraces are noted. There are calcified lymph nodes and granulomas. There is a left chest wall MediPort with the tip in the region of the brachiocephalic vein confluence. 1. No active pulmonary disease.      Cta Neck W Contrast    Result Date: 12/14/2020 EXAMINATION: CTA OF THE HEAD WITH CONTRAST; CTA OF THE NECK 12/14/2020 10:22 am: TECHNIQUE: CTA of the head/brain was performed with the administration of intravenous contrast. Multiplanar reformatted images are provided for review. MIP images are provided for review. Dose modulation, iterative reconstruction, and/or weight based adjustment of the mA/kV was utilized to reduce the radiation dose to as low as reasonably achievable.; CTA of the neck was performed with the administration of intravenous contrast. Multiplanar reformatted images are provided for review. MIP images are provided for review. Stenosis of the internal carotid arteries measured using NASCET criteria. Dose modulation, iterative reconstruction, and/or weight based adjustment of the mA/kV was utilized to reduce the radiation dose to as low as reasonably achievable. COMPARISON: Noncontrast CT head from earlier today HISTORY: ORDERING SYSTEM PROVIDED HISTORY: dizziness TECHNOLOGIST PROVIDED HISTORY: Has a \"code stroke\" or \"stroke alert\" been called? ->Yes Reason for exam:->dizziness FINDINGS: CTA NECK: AORTIC ARCH/ARCH VESSELS: No dissection or arterial injury. No significant stenosis of the brachiocephalic or subclavian arteries. CAROTID ARTERIES: No dissection, arterial injury, or hemodynamically significant stenosis by NASCET criteria. VERTEBRAL ARTERIES: No dissection, arterial injury, or significant stenosis. SOFT TISSUES: There is mild ground-glass attenuation at the lung apices, may be related to underdistention versus pneumonitis. There is bilateral apical scarring. No cervical or superior mediastinal lymphadenopathy. The larynx and pharynx are unremarkable. No acute abnormality of the salivary glands. There is 4 mm right thyroid nodule, does not require follow-up due to small size. BONES: No acute osseous abnormality. There are moderate degenerative changes in the cervical spine.  CTA HEAD: ANTERIOR CIRCULATION: No significant stenosis of the intracranial internal carotid, anterior cerebral, or middle cerebral arteries. No aneurysm. POSTERIOR CIRCULATION: There is fetal origin of the left PCA, normal variant. No significant stenosis of the vertebral, basilar, or posterior cerebral arteries. No aneurysm. OTHER: No dural venous sinus thrombosis on this non-dedicated study. BRAIN: No mass effect or midline shift. No extra-axial fluid collection. The gray-white differentiation is maintained. No acute abnormality or flow-limiting stenosis of the major arteries of the head and neck.      Cta Head W Contrast    Result Date: 12/14/2020 of the intracranial internal carotid, anterior cerebral, or middle cerebral arteries. No aneurysm. POSTERIOR CIRCULATION: There is fetal origin of the left PCA, normal variant. No significant stenosis of the vertebral, basilar, or posterior cerebral arteries. No aneurysm. OTHER: No dural venous sinus thrombosis on this non-dedicated study. BRAIN: No mass effect or midline shift. No extra-axial fluid collection. The gray-white differentiation is maintained. No acute abnormality or flow-limiting stenosis of the major arteries of the head and neck.        EKG this visit:  Reviewed       Electronically signed by LISA Marrero CNP on 12/14/2020 at 1:01 PM

## 2020-12-15 VITALS
TEMPERATURE: 97.8 F | RESPIRATION RATE: 16 BRPM | HEART RATE: 74 BPM | BODY MASS INDEX: 34.81 KG/M2 | HEIGHT: 65 IN | WEIGHT: 208.9 LBS | DIASTOLIC BLOOD PRESSURE: 101 MMHG | OXYGEN SATURATION: 96 % | SYSTOLIC BLOOD PRESSURE: 119 MMHG

## 2020-12-15 LAB
ANION GAP SERPL CALCULATED.3IONS-SCNC: 9 MMOL/L (ref 4–16)
BUN BLDV-MCNC: 14 MG/DL (ref 6–23)
CALCIUM SERPL-MCNC: 9 MG/DL (ref 8.3–10.6)
CHLORIDE BLD-SCNC: 103 MMOL/L (ref 99–110)
CO2: 29 MMOL/L (ref 21–32)
CREAT SERPL-MCNC: 0.6 MG/DL (ref 0.6–1.1)
FOLATE: 19.1 NG/ML (ref 3.1–17.5)
GFR AFRICAN AMERICAN: >60 ML/MIN/1.73M2
GFR NON-AFRICAN AMERICAN: >60 ML/MIN/1.73M2
GLUCOSE BLD-MCNC: 101 MG/DL (ref 70–99)
POTASSIUM SERPL-SCNC: 3.1 MMOL/L (ref 3.5–5.1)
SODIUM BLD-SCNC: 141 MMOL/L (ref 135–145)
T4 FREE: 1.32 NG/DL (ref 0.9–1.8)
TSH HIGH SENSITIVITY: 2.78 UIU/ML (ref 0.27–4.2)
VITAMIN B-12: 579.2 PG/ML (ref 211–911)

## 2020-12-15 PROCEDURE — 6370000000 HC RX 637 (ALT 250 FOR IP): Performed by: HOSPITALIST

## 2020-12-15 PROCEDURE — 80048 BASIC METABOLIC PNL TOTAL CA: CPT

## 2020-12-15 PROCEDURE — 36415 COLL VENOUS BLD VENIPUNCTURE: CPT

## 2020-12-15 PROCEDURE — 82607 VITAMIN B-12: CPT

## 2020-12-15 PROCEDURE — 2580000003 HC RX 258: Performed by: INTERNAL MEDICINE

## 2020-12-15 PROCEDURE — 84443 ASSAY THYROID STIM HORMONE: CPT

## 2020-12-15 PROCEDURE — 6370000000 HC RX 637 (ALT 250 FOR IP): Performed by: INTERNAL MEDICINE

## 2020-12-15 PROCEDURE — G0378 HOSPITAL OBSERVATION PER HR: HCPCS

## 2020-12-15 PROCEDURE — 84439 ASSAY OF FREE THYROXINE: CPT

## 2020-12-15 PROCEDURE — 82746 ASSAY OF FOLIC ACID SERUM: CPT

## 2020-12-15 PROCEDURE — 94761 N-INVAS EAR/PLS OXIMETRY MLT: CPT

## 2020-12-15 PROCEDURE — 6360000002 HC RX W HCPCS: Performed by: INTERNAL MEDICINE

## 2020-12-15 PROCEDURE — 96372 THER/PROPH/DIAG INJ SC/IM: CPT

## 2020-12-15 RX ORDER — FENOFIBRATE 54 MG/1
54 TABLET ORAL DAILY
Qty: 30 TABLET | Refills: 0 | Status: SHIPPED | OUTPATIENT
Start: 2020-12-16 | End: 2021-02-16

## 2020-12-15 RX ORDER — POTASSIUM CHLORIDE 20 MEQ/1
40 TABLET, EXTENDED RELEASE ORAL DAILY
Qty: 60 TABLET | Refills: 0 | Status: SHIPPED | OUTPATIENT
Start: 2020-12-15 | End: 2021-06-07

## 2020-12-15 RX ORDER — FENOFIBRATE 54 MG/1
54 TABLET ORAL DAILY
Status: DISCONTINUED | OUTPATIENT
Start: 2020-12-15 | End: 2020-12-15 | Stop reason: HOSPADM

## 2020-12-15 RX ORDER — POTASSIUM CHLORIDE 20 MEQ/1
40 TABLET, EXTENDED RELEASE ORAL 2 TIMES DAILY WITH MEALS
Status: DISCONTINUED | OUTPATIENT
Start: 2020-12-15 | End: 2020-12-15 | Stop reason: HOSPADM

## 2020-12-15 RX ORDER — HEPARIN SODIUM (PORCINE) LOCK FLUSH IV SOLN 100 UNIT/ML 100 UNIT/ML
500 SOLUTION INTRAVENOUS PRN
Status: DISCONTINUED | OUTPATIENT
Start: 2020-12-15 | End: 2020-12-15 | Stop reason: HOSPADM

## 2020-12-15 RX ADMIN — ATENOLOL 12.5 MG: 25 TABLET ORAL at 11:12

## 2020-12-15 RX ADMIN — PANTOPRAZOLE SODIUM 40 MG: 40 TABLET, DELAYED RELEASE ORAL at 11:11

## 2020-12-15 RX ADMIN — POTASSIUM CHLORIDE 40 MEQ: 1500 TABLET, EXTENDED RELEASE ORAL at 17:46

## 2020-12-15 RX ADMIN — ENOXAPARIN SODIUM 40 MG: 100 INJECTION SUBCUTANEOUS at 11:12

## 2020-12-15 RX ADMIN — POTASSIUM CHLORIDE 40 MEQ: 1500 TABLET, EXTENDED RELEASE ORAL at 11:11

## 2020-12-15 RX ADMIN — FENOFIBRATE 54 MG: 54 TABLET ORAL at 11:11

## 2020-12-15 RX ADMIN — ACETAMINOPHEN 650 MG: 325 TABLET ORAL at 11:24

## 2020-12-15 RX ADMIN — ALLOPURINOL 100 MG: 100 TABLET ORAL at 11:11

## 2020-12-15 RX ADMIN — SODIUM CHLORIDE, PRESERVATIVE FREE 10 ML: 5 INJECTION INTRAVENOUS at 11:12

## 2020-12-15 ASSESSMENT — PAIN DESCRIPTION - PAIN TYPE: TYPE: CHRONIC PAIN

## 2020-12-15 ASSESSMENT — PAIN DESCRIPTION - ORIENTATION: ORIENTATION: RIGHT

## 2020-12-15 ASSESSMENT — PAIN DESCRIPTION - FREQUENCY: FREQUENCY: CONTINUOUS

## 2020-12-15 ASSESSMENT — PAIN DESCRIPTION - DESCRIPTORS: DESCRIPTORS: DISCOMFORT;TIGHTNESS

## 2020-12-15 ASSESSMENT — PAIN DESCRIPTION - LOCATION: LOCATION: SHOULDER

## 2020-12-15 ASSESSMENT — PAIN DESCRIPTION - ONSET: ONSET: ON-GOING

## 2020-12-15 ASSESSMENT — PAIN SCALES - GENERAL: PAINLEVEL_OUTOF10: 5

## 2020-12-15 NOTE — DISCHARGE SUMMARY
Physician Discharge Summary     Patient ID:  João Valenzuela  [id number]  46 y.o.  1945    Admit date: 12/14/2020    Discharge date and time: No discharge date for patient encounter. Admitting Physician: Aleida Maguire MD     Discharge Physician: Alfonso See    Admission Diagnoses: Dizziness [R42]    Discharge Diagnoses: Dizziness                                          Hypokalemia due to chronic diarrhea                                          HTN                                          HPL                                                Macrocytosis                                          HPL    Admission Condition: fair    Discharged Condition: good    Indication for Admission: dizziness    Hospital Course:   76 y.o. female who presents with dizziness. The patient reports severe symptoms. Onset was this morning upon waking. LKW ~2200 12/13/2020. Reports some disorientation which prompted ER evaluation. Symptoms are consistent with hx of vertigo but more severe and worse with exertion. She is also concerned about dehydration states minimal fluid intake in past 24hrs and reports associated dysuria for > 1 week      She was admitted for dizziness and CT head and MRI brain neg. Echo showed echodensity MV and consistent with calcifications. Holter ordered and results pending. Her symptoms were most likely due to volume loss due to chronic diarrhea and medications so her diuretic and thiazide was stopped. She was placed on higher dose of potassium as was persistently hypokalemic. Will need to follow up with PCP for BMP in 2 wks. Consults: cardiology        Outstanding Order Results     Date and Time Order Name Status Description    12/15/2020 0137 Vitamin B12 & Folate In process               Discharge Exam:  HEENT: Normal HEENT exam.    Heart: Normal rate. Abdomen: Abdomen is soft. Bowel sounds are normal.     Extremities: Decreased range of motion. Neurological: Patient is alert. Follow-up with PCP.   Discharge time 30min  Signed:  Ruthy Valdes  12/15/2020  2:41 PM

## 2020-12-15 NOTE — PROGRESS NOTES
Progress Note  Date:12/15/2020       AABA:1010/1146-R  Patient Anamika Stevenson     YOB: 1945     Age:75 y.o. Dizziness is better  Subjective    Subjective:  Symptoms:  Stable. Diet:  Adequate intake. Pain:  She reports no pain. Review of Systems  Objective         Vitals Last 24 Hours:  TEMPERATURE:  Temp  Av °F (36.7 °C)  Min: 97.7 °F (36.5 °C)  Max: 98.3 °F (36.8 °C)  RESPIRATIONS RANGE: Resp  Av.9  Min: 8  Max: 45  PULSE OXIMETRY RANGE: SpO2  Av.2 %  Min: 96 %  Max: 99 %  PULSE RANGE: Pulse  Av.7  Min: 72  Max: 99  BLOOD PRESSURE RANGE: Systolic (14XKW), ABH:028 , Min:112 , ROW:868   ; Diastolic (64QVE), HTK:44, Min:49, Max:102    I/O (24Hr): No intake or output data in the 24 hours ending 12/15/20 0829  Objective:  General Appearance:  Comfortable. Vital signs: (most recent): Blood pressure 94/80, pulse 74, temperature 97.9 °F (36.6 °C), temperature source Oral, resp. rate 16, height 5' 5\" (1.651 m), weight 208 lb 14.4 oz (94.8 kg), SpO2 96 %, not currently breastfeeding. Vital signs are normal.    HEENT: Normal HEENT exam.    Heart: Normal rate. Abdomen: Abdomen is soft. Bowel sounds are normal.     Extremities: Decreased range of motion. Neurological: Patient is alert. Pupils:  Pupils are equal, round, and reactive to light. Pupils are equal.   Skin:  Warm. Labs/Imaging/Diagnostics    Labs:  CBC:  Recent Labs     20   WBC 5.7   RBC 4.15*   HGB 14.2   HCT 44.0   .0*   RDW 12.2        CHEMISTRIES:  Recent Labs     12/14/20  0928 12/14/20  0929 12/15/20  0137     --  141   K 3.2*  --  3.1*   CL 99  --  103   CO2 25  --  29   BUN 21  --  14   CREATININE 0.5*  --  0.6   GLUCOSE 117* 90 101*   MG 2.0  --   --      PT/INR:  Recent Labs     20   PROTIME 10.9*   INR 0.90     APTT:No results for input(s): APTT in the last 72 hours.   LIVER PROFILE:  Recent Labs     20   AST 26   ALT 24 BILITOT 0.6   ALKPHOS 64       Imaging Last 24 Hours:  Echocardiogram Complete 2d With Doppler With Color    Result Date: 12/14/2020 Transthoracic Echocardiography Report (TTE)  Demographics   Patient Name       Fay Merrill      Date of Study       12/14/2020   Date of Birth      1945         Gender              Female   Age                76 year(s)         Race                   Patient Number     [id number]         Room Number         ED14   Visit Number       [id number]   Corporate ID       [id number]   Accession Number   [id number]         Sonographer         Susan Walden                                                            RDCS, RDMS, RVT   Ordering Physician Peyton Michaud    Interpreting        Radha Powell MD                     CNP                Physician  Procedure Type of Study   TTE procedure:ECHOCARDIOGRAM COMPLETE 2D W DOPPLER W COLOR. Procedure Date Date: 12/14/2020 Start: 02:31 PM Study Location: Portable Technical Quality: Fair visualization Indications:CVA. Patient Status: Routine Height: 65 inches Weight: 202 pounds BSA: 1.99 m2 BMI: 33.61 kg/m2 HR: 95 bpm BP: 135/56 mmHg  Conclusions   Summary  Left ventricular systolic function is hyperdynamic. Ejection fraction is visually estimated at >60%. Grade I diastolic dysfunction. Sclerotic, but non-stenotic aortic valve. Echodensity noted on the anterior leaflet of the mitral valve. No evidence of any pericardial effusion. Signature   ------------------------------------------------------------------  Electronically signed by Radha Powell MD (Interpreting  physician) on 12/14/2020 at 04:26 PM  ------------------------------------------------------------------   Findings   Left Ventricle  Left ventricular systolic function is hyperdynamic. Ejection fraction is visually estimated at >60%. Grade I diastolic dysfunction. Left Atrium  Essentially normal left atrium. Right Atrium  Essentially normal right atrium. Right Ventricle  Essentially normal right ventricle. Aortic Valve  Sclerotic, but non-stenotic aortic valve.    Mitral Valve Echodensity noted on the anterior leaflet of the mitral valve. Tricuspid Valve  Trace tricuspid regurgitation; normal RVSP. Pulmonic Valve  The pulmonic valve was not well visualized. Pericardial Effusion  No evidence of any pericardial effusion. Pleural Effusion  No evidence of pleural effusion.   M-Mode/2D Measurements & Calculations   LV Diastolic Dimension:  LV Systolic Dimension:  LA Dimension: 3.1 cmAO Root  3.84 cm                  2.76 cm                 Dimension: 2.7 cmLA Area:  LV FS:28.1 %             LV Volume Diastolic: 35 12.1 cm2  LV PW Diastolic: 4.57 cm ml  LV PW Systolic: 1.64 cm  LV Volume Systolic: 13  Septum Diastolic: 9.88   ml  cm                       LV EDV/LV EDV Index: 35 RV Diastolic Dimension:  Septum Systolic: 9.19 cm TE/66 P0BW ESV/LV ESV   3.34 cm  CO: 5.19 l/min           Index: 13 ml/7 m2  CI: 2.61 l/m*m2          EF Calculated (A4C):    LA/Aorta: 1.15                           62.9 %  LV Area Diastolic: 06.0  EF Calculated (2D):     LA volume/Index: 23 ml  cm2                      55.1 %                  /58T5  LV Area Systolic: 2.64  cm2                      LV Length: 7.47 cm                            LVOT: 2 cm  Doppler Measurements & Calculations   MV Peak E-Wave: 61.2    AV Peak Velocity: 141 cm/s   LVOT Peak Velocity: 109  cm/s                    AV Peak Gradient: 7.95 mmHg  cm/s  MV Peak A-Wave: 102     AV Mean Velocity: 97.1 cm/s  LVOT Mean Velocity:  cm/s                    AV Mean Gradient: 5 mmHg     72.7 cm/s  MV E/A Ratio: 0.6       AV VTI: 20.9 cm              LVOT Peak Gradient: 5  MV Peak Gradient: 1.5   AV Area (Continuity):2.61    mmHgLVOT Mean Gradient:  mmHg                    cm2                          3 mmHg                           LVOT VTI: 17.4 cm   MV E' Septal Velocity:  5.59 cm/s  MV E' Lateral Velocity:  7.24 cm/s  MV E/E' septal: 10.95  MV E/E' lateral: 8.45      Ct Head Wo Contrast    Result Date: 12/14/2020 EXAMINATION: CT OF THE HEAD WITHOUT CONTRAST  12/14/2020 9:52 am TECHNIQUE: CT of the head was performed without the administration of intravenous contrast. Dose modulation, iterative reconstruction, and/or weight based adjustment of the mA/kV was utilized to reduce the radiation dose to as low as reasonably achievable. COMPARISON: None. HISTORY: ORDERING SYSTEM PROVIDED HISTORY: dizziness TECHNOLOGIST PROVIDED HISTORY: Has a \"code stroke\" or \"stroke alert\" been called? ->Yes Reason for exam:->dizziness Reason for Exam: dizziness Acuity: Acute Type of Exam: Initial FINDINGS: BRAIN/VENTRICLES: There is periventricular white matter low attenuation, likely related to mild chronic microvascular disease. There is no acute intracranial hemorrhage, mass effect or midline shift. No abnormal extra-axial fluid collection. The gray-white differentiation is maintained without evidence of an acute infarct. There is 3 mm calcific density at the anterior aspect of the 3rd ventricle, may be secondary to calcification related to choroid plexus versus calcified colloid cyst.  There is no hydrocephalus. ORBITS: The visualized portion of the orbits demonstrate no acute abnormality. SINUSES: There is partial opacification of the right sphenoid sinus. There is mild effusion in the right mastoid air cells. There is partial opacification of the right middle ear cavity. The remainder of the visualized paranasal sinuses and mastoid air cells demonstrate no acute abnormality. SOFT TISSUES/SKULL:  No acute abnormality of the visualized skull or soft tissues. No acute intracranial abnormality. Mild chronic microvascular disease. 3 mm calcific density at the anterior aspect of the 3rd ventricle, may be secondary to calcification related to choroid plexus versus calcified colloid cyst.  No hydrocephalus. Partial opacification of the right sphenoid sinus. Mild right mastoid effusion. Partial opacification of the right middle ear cavity. Results reported to Dr. Urbano Knott at 10:05 a.m. on December 14, 2020. Xr Chest Portable    Result Date: 12/14/2020  EXAMINATION: ONE XRAY VIEW OF THE CHEST 12/14/2020 10:06 am COMPARISON: None. HISTORY: ORDERING SYSTEM PROVIDED HISTORY: dizziness TECHNOLOGIST PROVIDED HISTORY: Reason for exam:->dizziness Reason for Exam: DIZZNESS Acuity: Unknown Type of Exam: Unknown FINDINGS: The heart size is within normal limits. The pulmonary vasculature is also within normal limits. No acute infiltrates are seen. The costophrenic angles are sharp bilaterally. No pneumothoraces are noted. There are calcified lymph nodes and granulomas. There is a left chest wall MediPort with the tip in the region of the brachiocephalic vein confluence. 1. No active pulmonary disease.      Cta Neck W Contrast    Result Date: 12/14/2020 EXAMINATION: CTA OF THE HEAD WITH CONTRAST; CTA OF THE NECK 12/14/2020 10:22 am: TECHNIQUE: CTA of the head/brain was performed with the administration of intravenous contrast. Multiplanar reformatted images are provided for review. MIP images are provided for review. Dose modulation, iterative reconstruction, and/or weight based adjustment of the mA/kV was utilized to reduce the radiation dose to as low as reasonably achievable.; CTA of the neck was performed with the administration of intravenous contrast. Multiplanar reformatted images are provided for review. MIP images are provided for review. Stenosis of the internal carotid arteries measured using NASCET criteria. Dose modulation, iterative reconstruction, and/or weight based adjustment of the mA/kV was utilized to reduce the radiation dose to as low as reasonably achievable. COMPARISON: Noncontrast CT head from earlier today HISTORY: ORDERING SYSTEM PROVIDED HISTORY: dizziness TECHNOLOGIST PROVIDED HISTORY: Has a \"code stroke\" or \"stroke alert\" been called? ->Yes Reason for exam:->dizziness FINDINGS: CTA NECK: AORTIC ARCH/ARCH VESSELS: No dissection or arterial injury. No significant stenosis of the brachiocephalic or subclavian arteries. CAROTID ARTERIES: No dissection, arterial injury, or hemodynamically significant stenosis by NASCET criteria. VERTEBRAL ARTERIES: No dissection, arterial injury, or significant stenosis. SOFT TISSUES: There is mild ground-glass attenuation at the lung apices, may be related to underdistention versus pneumonitis. There is bilateral apical scarring. No cervical or superior mediastinal lymphadenopathy. The larynx and pharynx are unremarkable. No acute abnormality of the salivary glands. There is 4 mm right thyroid nodule, does not require follow-up due to small size. BONES: No acute osseous abnormality. There are moderate degenerative changes in the cervical spine.  CTA HEAD: ANTERIOR CIRCULATION: No significant stenosis of the intracranial internal carotid, anterior cerebral, or middle cerebral arteries. No aneurysm. POSTERIOR CIRCULATION: There is fetal origin of the left PCA, normal variant. No significant stenosis of the vertebral, basilar, or posterior cerebral arteries. No aneurysm. OTHER: No dural venous sinus thrombosis on this non-dedicated study. BRAIN: No mass effect or midline shift. No extra-axial fluid collection. The gray-white differentiation is maintained. No acute abnormality or flow-limiting stenosis of the major arteries of the head and neck.      Cta Head W Contrast    Result Date: 12/14/2020 of the intracranial internal carotid, anterior cerebral, or middle cerebral arteries. No aneurysm. POSTERIOR CIRCULATION: There is fetal origin of the left PCA, normal variant. No significant stenosis of the vertebral, basilar, or posterior cerebral arteries. No aneurysm. OTHER: No dural venous sinus thrombosis on this non-dedicated study. BRAIN: No mass effect or midline shift. No extra-axial fluid collection. The gray-white differentiation is maintained. No acute abnormality or flow-limiting stenosis of the major arteries of the head and neck. Mri Brain Wo Contrast    Result Date: 12/14/2020  EXAMINATION: MRI OF THE BRAIN WITHOUT CONTRAST  12/14/2020 2:02 pm TECHNIQUE: Multiplanar multisequence MRI of the brain was performed without the administration of intravenous contrast. COMPARISON: CT head earlier today. HISTORY: ORDERING SYSTEM PROVIDED HISTORY: dizziness. stroke evaluation. TECHNOLOGIST PROVIDED HISTORY: Reason for exam:->dizziness. stroke evaluation. Reason for Exam: dizziness, stroke evaluation Acuity: Acute Type of Exam: Initial Relevant Medical/Surgical History: none FINDINGS: INTRACRANIAL STRUCTURES/VENTRICLES: No acute infarct or mass. Mild chronic white matter microvascular ischemic changes. No mass effect or midline shift. No evidence of an acute intracranial hemorrhage. The ventricles and sulci are normal in size and configuration. The sellar/suprasellar regions appear unremarkable. The normal signal voids within the major intracranial vessels appear maintained. ORBITS: The visualized portion of the orbits demonstrate no acute abnormality. SINUSES: Mucoperiosteal thickening in the right sphenoid sinus. Right mastoid effusion. No left mastoid effusion. BONES/SOFT TISSUES: The bone marrow signal intensity appears normal. The soft tissues demonstrate no acute abnormality.

## 2020-12-15 NOTE — CONSULTS
[address] [facility]               [address] [facility], [address] [facility]                                  CONSULTATION    PATIENT NAME: Junie Boothe                      :        1945  MED REC NO:   [id number]                          ROOM:       3012  ACCOUNT NO:   [de-identified]                           ADMIT DATE: 2020  PROVIDER:     Brennan Ruiz MD    CONSULT DATE:  12/15/2020    INDICATION:  Dizziness and lightheadedness. HISTORY OF PRESENT ILLNESS:  This is a 44-year-old female patient, came  to the hospital yesterday with having dizziness present. She said that  yesterday, she got up and she had symptoms like vertigo present. She  felt like she almost was going to pass out. She says she has not been  drinking much fluids also. She is worried of having hypokalemia present  and dysuria present. She denies any chest pain present. No other  or  GI complaints present. She follows in the office also. PAST MEDICAL HISTORY:  The patient has a history of breast cancer on the  right side present. She had treatment done in the remote past, about 50  years ago, then she had recurrent breast cancer, for which she had a  mastectomy done. I think she has been unstable since then. History of  hypertension, hyperlipidemia, irritable bowel syndrome present, and  history of _____ present. MEDICATIONS AT HOME:  She is on Arimidex, potassium, Lasix, Tenormin  12.5 mg a day, and hydrochlorothiazide 25 mg a day. PAST SURGICAL HISTORY:  Right-sided mastectomy done, appendectomy done,  and gallbladder surgery done. SOCIAL HISTORY:  She does not smoke, does not drink. PHYSICAL EXAMINATION:  GENERAL:  The patient is awake and alert, answering questions, not in  acute distress. VITAL SIGNS:  Temperature is afebrile, pulse is 70, blood pressure is  124/58, sinus rhythm present. HEENT:  Head is normocephalic and atraumatic.   Pupils are equal and

## 2020-12-16 LAB
EKG ATRIAL RATE: 78 BPM
EKG DIAGNOSIS: NORMAL
EKG P AXIS: 48 DEGREES
EKG P-R INTERVAL: 224 MS
EKG Q-T INTERVAL: 406 MS
EKG QRS DURATION: 94 MS
EKG QTC CALCULATION (BAZETT): 462 MS
EKG R AXIS: 15 DEGREES
EKG T AXIS: 24 DEGREES
EKG VENTRICULAR RATE: 78 BPM

## 2020-12-18 LAB
ACQUISITION DURATION: NORMAL S
AVERAGE HEART RATE: 78 BPM
EKG DIAGNOSIS: NORMAL
HOOKUP DATE: NORMAL
HOOKUP TIME: NORMAL
MAX HEART RATE TIME/DATE: NORMAL
MAX HEART RATE: 118 BPM
MIN HEART RATE TIME/DATE: NORMAL
MIN HEART RATE: 50 BPM
NUMBER OF QRS COMPLEXES: NORMAL
NUMBER OF SUPRAVENTRICULAR BEATS IN RUNS: 0
NUMBER OF SUPRAVENTRICULAR COUPLETS: 0
NUMBER OF SUPRAVENTRICULAR ECTOPICS: 10
NUMBER OF SUPRAVENTRICULAR ISOLATED BEATS: 10
NUMBER OF SUPRAVENTRICULAR RUNS: 0
NUMBER OF VENTRICULAR BEATS IN RUNS: 0
NUMBER OF VENTRICULAR BIGEMINAL CYCLES: 0
NUMBER OF VENTRICULAR COUPLETS: 2
NUMBER OF VENTRICULAR ECTOPICS: 196
NUMBER OF VENTRICULAR ISOLATED BEATS: 192
NUMBER OF VENTRICULAR RUNS: 0

## 2020-12-19 NOTE — PROCEDURES
[address], [address] [facility]                                 HOLTER MONITOR    PATIENT NAME: Nata Medeiros                      :        1945  MED REC NO:   [id number]                          ROOM:       3012  ACCOUNT NO:   [de-identified]                           ADMIT DATE: 2020  PROVIDER:     Rodrigue Ruggiero MD    HOLTER MONITOR 24-HOURS    DATE OF STUDY:  2020    INDICATION:  Bradycardia. FINDINGS:  This is a 24-hour Holter monitor. Minimum heart rate is 50  beats per minute. Average heart rate is 70 beats per minute. Maximum  heart rate is 118 beats per minute. The longest R-R interval is 1.58  seconds. The patient's entire rhythm is sinus rhythm. The patient with  a maximum heart rate of 118, sinus tachycardia present. The patient has  isolated PVCs noted less than 1% and isolated PAC noted. IMPRESSION:  1. Sinus rhythm. 2.  Sinus tachycardia. 3.  Isolated PAC and PVC.         Jay Alvarez MD    D: 2020 18:14:00       T: 2020 20:46:11     [id number]  Job#: [id number]     Doc#: [id number]    CC:

## 2021-01-22 ENCOUNTER — HOSPITAL ENCOUNTER (OUTPATIENT)
Age: 76
Discharge: HOME | End: 2021-01-22
Payer: MEDICARE

## 2021-01-22 LAB
REASON FOR REJECTION: NORMAL
REJECTED TEST: NORMAL

## 2021-01-22 PROCEDURE — 36415 COLL VENOUS BLD VENIPUNCTURE: CPT

## 2021-01-26 DIAGNOSIS — C50.411 MALIGNANT NEOPLASM OF UPPER-OUTER QUADRANT OF RIGHT BREAST IN FEMALE, ESTROGEN RECEPTOR POSITIVE (HCC): Primary | ICD-10-CM

## 2021-01-26 DIAGNOSIS — Z17.0 MALIGNANT NEOPLASM OF UPPER-OUTER QUADRANT OF RIGHT BREAST IN FEMALE, ESTROGEN RECEPTOR POSITIVE (HCC): Primary | ICD-10-CM

## 2021-02-03 ENCOUNTER — HOSPITAL ENCOUNTER (OUTPATIENT)
Dept: INFUSION THERAPY | Age: 76
Discharge: HOME | End: 2021-02-03
Payer: MEDICARE

## 2021-02-03 DIAGNOSIS — Z17.0 MALIGNANT NEOPLASM OF UPPER-OUTER QUADRANT OF RIGHT BREAST IN FEMALE, ESTROGEN RECEPTOR POSITIVE (HCC): Primary | ICD-10-CM

## 2021-02-03 DIAGNOSIS — C50.411 MALIGNANT NEOPLASM OF UPPER-OUTER QUADRANT OF RIGHT BREAST IN FEMALE, ESTROGEN RECEPTOR POSITIVE (HCC): Primary | ICD-10-CM

## 2021-02-03 DIAGNOSIS — M89.8X9 OTHER SPECIFIED DISORDERS OF BONE, UNSPECIFIED SITE: ICD-10-CM

## 2021-02-03 LAB
ALBUMIN SERPL-MCNC: 4.5 GM/DL (ref 3.4–5)
ALP BLD-CCNC: 62 IU/L (ref 40–128)
ALT SERPL-CCNC: 21 U/L (ref 10–40)
ANION GAP SERPL CALCULATED.3IONS-SCNC: 13 MMOL/L (ref 4–16)
AST SERPL-CCNC: 18 IU/L (ref 15–37)
BASOPHILS ABSOLUTE: 0 K/CU MM
BASOPHILS RELATIVE PERCENT: 0.4 % (ref 0–1)
BILIRUB SERPL-MCNC: 0.4 MG/DL (ref 0–1)
BUN BLDV-MCNC: 24 MG/DL (ref 6–23)
CALCIUM SERPL-MCNC: 10.2 MG/DL (ref 8.3–10.6)
CHLORIDE BLD-SCNC: 101 MMOL/L (ref 99–110)
CO2: 28 MMOL/L (ref 21–32)
CREAT SERPL-MCNC: 0.8 MG/DL (ref 0.6–1.1)
DIFFERENTIAL TYPE: ABNORMAL
EOSINOPHILS ABSOLUTE: 0.1 K/CU MM
EOSINOPHILS RELATIVE PERCENT: 1.5 % (ref 0–3)
GFR AFRICAN AMERICAN: >60 ML/MIN/1.73M2
GFR NON-AFRICAN AMERICAN: >60 ML/MIN/1.73M2
GLUCOSE BLD-MCNC: 93 MG/DL (ref 70–99)
HCT VFR BLD CALC: 41.6 % (ref 37–47)
HEMOGLOBIN: 14.6 GM/DL (ref 12.5–16)
LYMPHOCYTES ABSOLUTE: 1.4 K/CU MM
LYMPHOCYTES RELATIVE PERCENT: 25.9 % (ref 24–44)
MCH RBC QN AUTO: 35.4 PG (ref 27–31)
MCHC RBC AUTO-ENTMCNC: 35.1 % (ref 32–36)
MCV RBC AUTO: 100.7 FL (ref 78–100)
MONOCYTES ABSOLUTE: 0.5 K/CU MM
MONOCYTES RELATIVE PERCENT: 9.6 % (ref 0–4)
PDW BLD-RTO: 12.3 % (ref 11.7–14.9)
PLATELET # BLD: 203 K/CU MM (ref 140–440)
PMV BLD AUTO: 10.2 FL (ref 7.5–11.1)
POTASSIUM SERPL-SCNC: 4.2 MMOL/L (ref 3.5–5.1)
RBC # BLD: 4.13 M/CU MM (ref 4.2–5.4)
SEGMENTED NEUTROPHILS ABSOLUTE COUNT: 3.4 K/CU MM
SEGMENTED NEUTROPHILS RELATIVE PERCENT: 62.6 % (ref 36–66)
SODIUM BLD-SCNC: 142 MMOL/L (ref 135–145)
TOTAL PROTEIN: 6.8 GM/DL (ref 6.4–8.2)
WBC # BLD: 5.4 K/CU MM (ref 4–10.5)

## 2021-02-03 PROCEDURE — 2580000003 HC RX 258: Performed by: INTERNAL MEDICINE

## 2021-02-03 PROCEDURE — 85025 COMPLETE CBC W/AUTO DIFF WBC: CPT

## 2021-02-03 PROCEDURE — 80053 COMPREHEN METABOLIC PANEL: CPT

## 2021-02-03 PROCEDURE — 86300 IMMUNOASSAY TUMOR CA 15-3: CPT

## 2021-02-03 PROCEDURE — 36591 DRAW BLOOD OFF VENOUS DEVICE: CPT

## 2021-02-03 RX ORDER — SODIUM CHLORIDE 0.9 % (FLUSH) 0.9 %
20 SYRINGE (ML) INJECTION PRN
Status: CANCELLED | OUTPATIENT
Start: 2021-02-03

## 2021-02-03 RX ORDER — HEPARIN SODIUM (PORCINE) LOCK FLUSH IV SOLN 100 UNIT/ML 100 UNIT/ML
500 SOLUTION INTRAVENOUS PRN
Status: CANCELLED | OUTPATIENT
Start: 2021-02-03

## 2021-02-03 RX ORDER — SODIUM CHLORIDE 0.9 % (FLUSH) 0.9 %
10 SYRINGE (ML) INJECTION PRN
Status: CANCELLED | OUTPATIENT
Start: 2021-02-03

## 2021-02-03 RX ORDER — SODIUM CHLORIDE 0.9 % (FLUSH) 0.9 %
10 SYRINGE (ML) INJECTION PRN
Status: DISCONTINUED | OUTPATIENT
Start: 2021-02-03 | End: 2021-02-04 | Stop reason: HOSPADM

## 2021-02-03 RX ORDER — HEPARIN SODIUM (PORCINE) LOCK FLUSH IV SOLN 100 UNIT/ML 100 UNIT/ML
500 SOLUTION INTRAVENOUS PRN
Status: DISCONTINUED | OUTPATIENT
Start: 2021-02-03 | End: 2021-02-04 | Stop reason: HOSPADM

## 2021-02-03 RX ADMIN — SODIUM CHLORIDE, PRESERVATIVE FREE 10 ML: 5 INJECTION INTRAVENOUS at 12:56

## 2021-02-03 RX ADMIN — HEPARIN SODIUM (PORCINE) LOCK FLUSH IV SOLN 100 UNIT/ML 500 UNITS: 100 SOLUTION at 12:57

## 2021-02-04 LAB — CA 15-3: 12 U/ML (ref 0–31)

## 2021-02-16 ENCOUNTER — OFFICE VISIT (OUTPATIENT)
Dept: ONCOLOGY | Age: 76
End: 2021-02-16
Payer: MEDICARE

## 2021-02-16 DIAGNOSIS — Z17.0 MALIGNANT NEOPLASM OF UPPER-OUTER QUADRANT OF RIGHT BREAST IN FEMALE, ESTROGEN RECEPTOR POSITIVE (HCC): Primary | ICD-10-CM

## 2021-02-16 DIAGNOSIS — C50.411 MALIGNANT NEOPLASM OF UPPER-OUTER QUADRANT OF RIGHT BREAST IN FEMALE, ESTROGEN RECEPTOR POSITIVE (HCC): Primary | ICD-10-CM

## 2021-02-16 PROCEDURE — 99423 OL DIG E/M SVC 21+ MIN: CPT | Performed by: INTERNAL MEDICINE

## 2021-02-16 RX ORDER — ANASTROZOLE 1 MG/1
1 TABLET ORAL DAILY
Qty: 90 TABLET | Refills: 3 | Status: SHIPPED | OUTPATIENT
Start: 2021-02-16 | End: 2021-08-27 | Stop reason: SDUPTHER

## 2021-02-16 NOTE — PROGRESS NOTES
[facility] Rooming Questions  Patient: Shellia Siemens  MRN: [id number]    Date: 2/16/2021        1. Do you have any new issues?   no         2. Do you need any refills on medications? yes - Anastrozole    3. Have you had any imaging done since your last visit? Yes, MRI, ct scan    4. Have you been hospitalized or seen in the emergency room since your last visit here?   yes - [facility]    5. Did the patient have a depression screening completed today?  No    No data recorded     PHQ-9 Given to (if applicable):               PHQ-9 Score (if applicable):                     [] Positive     []  Negative              Does question #9 need addressed (if applicable)                     [] Yes    []  No               Miroslava Fam MA

## 2021-02-16 NOTE — PROGRESS NOTES
Nilson Jesus Name: Wil Sheikh  Patient : 1945  Patient MRN: [id number]     Primary Oncologist: Conor Sal MD  PCP:Dr Escobedo [facility]  Surgeon Dr Gurvinder Mccauley     Date of Service: 20    Televisit    Chief Complaint:   No chief complaint on file. Active Problem list   Breast cancer    HPI  1. Mrs. Shalini Persaud ( 45) has a history of right breast cancer, for which she had modified radical mastectomy in . She had a couple of local recurrences, treated with chemotherapy and radiation therapy and excision of the chest wall recurrences. 2. In  she had a total abdominal hysterectomy/bilateral salpingo-oophorectomy, which was done as part of hormonal therapy and Tamoxifen for one year. After that she did not show any evidence of recurrence. 3. In  she presented with right axillary mass which was biopsied and confirmed to be an invasive carcinoma, morphologically consistent with origin from her breast primary. Her ER was positive 90 percent, ID positive. HER-2/naomi was negative by FISH. 4. All other w/u including CT of the chest, abdomen and pelvis showed no evidence of any intraabdominal mass or metastases. Bone scan was negative for any bony mets. Her MUGA scan showed 61 percent ejection fraction. 5. She was started on Taxol d1&8 w3dikpy for 6 courses starting in Dec of 2012 till 2013 and showed good partial response with mass size reduced to 2.4 cm in  from 3 cm in 2012 and completed 6 courses in 2013. Right Ax mass completely resolved over next couple years. 6. In May 2013 she was initiated on Hormonal therapy with Arimidex 1 mg daily for maintenance treatment. 7. In continued Clinical remission. 2017 Hemoglobin 14.3, hematocrit 42, white count 5,900, platelet 442,396, BUN 17, creatinine 0.7. Sodium was 141, potassium 3.4, chloride 95, calcium 10.0. The rest of the chemistries are normal. LFTs normal, , uric acid 6.0.  CEA 1.6, CA 27 14.     11-17 DEXA scan showed osteopenia, CT chest abdomen pelvis showed no evidence of metastatic disease, fatty liver, right renal nonobstructing parapelvic cyst, diverticulosis. Nuclear med bone scan showed no evidence to suggest osseous metastatic disease. June 2018 Liat [place] started but never got more than 1  July 2018 Left breast with no mammographic evidence of malignancy  july 2019 Liat [place] restarted but got only one dose again  9-2019 stopped anastrazole for bone pain/arthritic pains, which got better, then placed on letrozole but made her feel bad so she stopped it. 10-19 restarted anastrazole   Feb 2020 stopped AI  Feb 2020 Mammogram normal BIRADS cat 2  June 2020 restarted taking anastrazole. 7/9/2020 limited right breast axillary ultrasound revealed highly suspicious right breast 9:00 mass correlating to the palpable complaint. 7/14/2020 right breast area mass biopsy revealed invasive ductal carcinoma grade 2. ER greater than 95% positive. MT negative. HER-2 by IHC negative HER-2 by FISH pending. 7/16/2026 PET scan revealed metabolically active nodule along the inferior lateral margin of the right mastectomy site concerning for recurrent disease. This has slowly enlarged since 11/21/2017. No other metabolically active disease. 8/7/20: underwent excision with skin flap. Tumor size was 3cm, Grade 2, DCIS/LCIS not present. ER positive >95%, MT negative and her 2 neg by IHC and FISH. Margins <1mm     No recs for adjuvant XRT per Dr Olivia Toribio. Resumed AI September 2020    PAST MEDICAL HISTORY:   1. Hypertension since 1980s.,  2. Asthma since 2008.,  3. Allergies to mold, animals, dust, et Arthea Pica, seem to bother her ,  4. Arthritis since early 2000 involving her knee and neck. ,  5. Colonoscopy done in 2011 with biopsy of transverse colon showed benign mucosal lymphoid aggregates and a tubular adenoma from the ascending colon. The rest was negative. (Done by Dr. Roger Rodriguez.) ,  6.  CAT scan of the chest, which Labs:    Hematology:  Lab Results   Component Value Date    WBC 5.4 02/03/2021    RBC 4.13 (L) 02/03/2021    HGB 14.6 02/03/2021    HCT 41.6 02/03/2021    .7 (H) 02/03/2021    MCH 35.4 (H) 02/03/2021    MCHC 35.1 02/03/2021    RDW 12.3 02/03/2021     02/03/2021    MPV 10.2 02/03/2021    BANDSPCT 2 (L) 11/21/2017    SEGSPCT 62.6 02/03/2021    EOSRELPCT 1.5 02/03/2021    BASOPCT 0.4 02/03/2021    LYMPHOPCT 25.9 02/03/2021    MONOPCT 9.6 (H) 02/03/2021    BANDABS 0.10 11/21/2017    SEGSABS 3.4 02/03/2021    EOSABS 0.1 02/03/2021    BASOSABS 0.0 02/03/2021    LYMPHSABS 1.4 02/03/2021    MONOSABS 0.5 02/03/2021    DIFFTYPE AUTOMATED DIFFERENTIAL 02/03/2021    POLYCHROM 1+ 12/12/2016    PLTM FEW 12/12/2016     Lab Results   Component Value Date    ESR 12 03/08/2017       Chemistry:  Lab Results   Component Value Date     02/03/2021    K 4.2 02/03/2021     02/03/2021    CO2 28 02/03/2021    BUN 24 (H) 02/03/2021    CREATININE 0.8 02/03/2021    GLUCOSE 93 02/03/2021    CALCIUM 10.2 02/03/2021    PROT 6.8 02/03/2021    LABALBU 4.5 02/03/2021    BILITOT 0.4 02/03/2021    ALKPHOS 62 02/03/2021    AST 18 02/03/2021    ALT 21 02/03/2021    LABGLOM >60 02/03/2021    GFRAA >60 02/03/2021    PHOS 1.9 (L) 09/09/2019    MG 2.0 12/14/2020    POCGLU 90 12/14/2020     Lab Results   Component Value Date     10/17/2017     No components found for: LD  Lab Results   Component Value Date    TSHHS 2.780 12/15/2020    T4FREE 1.32 12/15/2020    FT3 3.2 04/27/2017       Immunology:  Lab Results   Component Value Date    PROT 6.8 02/03/2021     No results found for: Adelfo Whalen, KLFLCR  No results found for: B2M    Coagulation Panel:  Lab Results   Component Value Date    PROTIME 10.9 (L) 12/14/2020    INR 0.90 12/14/2020    APTT 23.8 04/27/2017    DDIMER 919 (H) 12/12/2016       Anemia Panel:  Lab Results   Component Value Date    CVYFFJRU95 579.2 12/15/2020    FOLATE 19.1 (H) 12/15/2020       Tumor Markers:  Lab Results   Component Value Date    CEA 2.1 01/22/2019    LABCA2 13.9 07/06/2020         Imaging: Reviewed     Pathology:Reviewed     Observations:  Performance Status: ECOG 2   Depression Status: PHQ 9 neg       Assessment & Plan:  1. Ca Breast 1972 with Right Ax Recurrence Nov 2012   Initial diagnosis in 1972 when she had right modified radical mastectomy. She had isolated local recurrences in the early eighties, for which she had excision, followed by radiation therapy and hormonal therapy. After being off treatment for many years, she had another recurrence in November of 2012 and after excision, she had minimal residual disease and has been on maintenance Arimidex for five years after initial chemotherapy with a taxane. Dr Escalante Generous recommendation was to keep her on hormonal therapy for at least 10 years or even longer. Has been off of AI intermittently for bone pain, last time since feb 2020. Resumed Luma 15 2020   PET with avid right axillary mass, biopsy-proven invasive ductal carcinoma IA ER positive IA negative and HER-2 negative. No other metastatic disease. S/P surgical resection  No recs for any advunat XRT  Plan to Continue AI and monitor for AE. Ca 27-29 feb 2021 12, CBC and CMP normal  Dexa scan 11/2017 with osteopenia. Recommend continuation of calcium and vitamin D. Repeat dexa scan October 2020 with osteopenia in the hips. Mammogram left  ordered    LE edema:Compressor helping . Is on lasix, Compression stockings and also recommend feet elevation    cholesteotoma? ??: follow with [facility]    Mild hypokalemia: K Normal. Continue K supplements OD as she is on lasix    Macrocytosis without anemia: Continue to monitor    Continue other medical care    Discussed the above findings and plan with her and she verbalized understanding    Discussed Lifestyle, healthy diet and regular exercise as tolerated.  Also discussed importance of being up-to-date with age-appropriate screening tools, Left mammogram due again feb 2021. Recommend follow-up with primary care physician and other specialists. Please do not hesitate to contact us if you need ay further information. Return to clinic in May 17  2021 or earlier if new symptoms. I have recommended that the patient follow CDC guidelines for prevention of COVID-19 infection.     Total time 23 min, 11:26am to 11:49am    [place]

## 2021-03-25 ENCOUNTER — TELEPHONE (OUTPATIENT)
Dept: ONCOLOGY | Age: 76
End: 2021-03-25

## 2021-05-10 ENCOUNTER — HOSPITAL ENCOUNTER (OUTPATIENT)
Dept: INFUSION THERAPY | Age: 76
Discharge: HOME | End: 2021-05-10
Payer: MEDICARE

## 2021-05-10 DIAGNOSIS — Z17.0 MALIGNANT NEOPLASM OF UPPER-OUTER QUADRANT OF RIGHT BREAST IN FEMALE, ESTROGEN RECEPTOR POSITIVE (HCC): ICD-10-CM

## 2021-05-10 DIAGNOSIS — C50.411 MALIGNANT NEOPLASM OF UPPER-OUTER QUADRANT OF RIGHT BREAST IN FEMALE, ESTROGEN RECEPTOR POSITIVE (HCC): ICD-10-CM

## 2021-05-10 DIAGNOSIS — M89.8X9 OTHER SPECIFIED DISORDERS OF BONE, UNSPECIFIED SITE: Primary | ICD-10-CM

## 2021-05-10 LAB
ALBUMIN SERPL-MCNC: 4.5 GM/DL (ref 3.4–5)
ALP BLD-CCNC: 64 IU/L (ref 40–128)
ALT SERPL-CCNC: 20 U/L (ref 10–40)
ANION GAP SERPL CALCULATED.3IONS-SCNC: 7 MMOL/L (ref 4–16)
AST SERPL-CCNC: 18 IU/L (ref 15–37)
BASOPHILS ABSOLUTE: 0 K/CU MM
BASOPHILS RELATIVE PERCENT: 0.3 % (ref 0–1)
BILIRUB SERPL-MCNC: 0.3 MG/DL (ref 0–1)
BUN BLDV-MCNC: 21 MG/DL (ref 6–23)
CALCIUM SERPL-MCNC: 10.1 MG/DL (ref 8.3–10.6)
CHLORIDE BLD-SCNC: 100 MMOL/L (ref 99–110)
CO2: 32 MMOL/L (ref 21–32)
CREAT SERPL-MCNC: 0.6 MG/DL (ref 0.6–1.1)
DIFFERENTIAL TYPE: ABNORMAL
EOSINOPHILS ABSOLUTE: 0.1 K/CU MM
EOSINOPHILS RELATIVE PERCENT: 1.7 % (ref 0–3)
GFR AFRICAN AMERICAN: >60 ML/MIN/1.73M2
GFR NON-AFRICAN AMERICAN: >60 ML/MIN/1.73M2
GLUCOSE BLD-MCNC: 111 MG/DL (ref 70–99)
HCT VFR BLD CALC: 40.3 % (ref 37–47)
HEMOGLOBIN: 14.1 GM/DL (ref 12.5–16)
LYMPHOCYTES ABSOLUTE: 1.3 K/CU MM
LYMPHOCYTES RELATIVE PERCENT: 23.4 % (ref 24–44)
MCH RBC QN AUTO: 35.4 PG (ref 27–31)
MCHC RBC AUTO-ENTMCNC: 35 % (ref 32–36)
MCV RBC AUTO: 101.3 FL (ref 78–100)
MONOCYTES ABSOLUTE: 0.6 K/CU MM
MONOCYTES RELATIVE PERCENT: 10.6 % (ref 0–4)
PDW BLD-RTO: 12.2 % (ref 11.7–14.9)
PLATELET # BLD: 193 K/CU MM (ref 140–440)
PMV BLD AUTO: 9.8 FL (ref 7.5–11.1)
POTASSIUM SERPL-SCNC: 3.4 MMOL/L (ref 3.5–5.1)
RBC # BLD: 3.98 M/CU MM (ref 4.2–5.4)
SEGMENTED NEUTROPHILS ABSOLUTE COUNT: 3.7 K/CU MM
SEGMENTED NEUTROPHILS RELATIVE PERCENT: 64 % (ref 36–66)
SODIUM BLD-SCNC: 139 MMOL/L (ref 135–145)
TOTAL PROTEIN: 6.6 GM/DL (ref 6.4–8.2)
WBC # BLD: 5.7 K/CU MM (ref 4–10.5)

## 2021-05-10 PROCEDURE — 80053 COMPREHEN METABOLIC PANEL: CPT

## 2021-05-10 PROCEDURE — 86300 IMMUNOASSAY TUMOR CA 15-3: CPT

## 2021-05-10 PROCEDURE — 36591 DRAW BLOOD OFF VENOUS DEVICE: CPT

## 2021-05-10 PROCEDURE — 85025 COMPLETE CBC W/AUTO DIFF WBC: CPT

## 2021-05-10 PROCEDURE — 2580000003 HC RX 258: Performed by: INTERNAL MEDICINE

## 2021-05-10 PROCEDURE — 6360000002 HC RX W HCPCS: Performed by: INTERNAL MEDICINE

## 2021-05-10 RX ORDER — HEPARIN SODIUM (PORCINE) LOCK FLUSH IV SOLN 100 UNIT/ML 100 UNIT/ML
500 SOLUTION INTRAVENOUS PRN
Status: DISCONTINUED | OUTPATIENT
Start: 2021-05-10 | End: 2021-05-11 | Stop reason: HOSPADM

## 2021-05-10 RX ORDER — SODIUM CHLORIDE 0.9 % (FLUSH) 0.9 %
20 SYRINGE (ML) INJECTION PRN
Status: DISCONTINUED | OUTPATIENT
Start: 2021-05-10 | End: 2021-05-11 | Stop reason: HOSPADM

## 2021-05-10 RX ORDER — SODIUM CHLORIDE 0.9 % (FLUSH) 0.9 %
20 SYRINGE (ML) INJECTION PRN
Status: CANCELLED | OUTPATIENT
Start: 2021-05-10

## 2021-05-10 RX ORDER — HEPARIN SODIUM (PORCINE) LOCK FLUSH IV SOLN 100 UNIT/ML 100 UNIT/ML
500 SOLUTION INTRAVENOUS PRN
Status: CANCELLED | OUTPATIENT
Start: 2021-05-10

## 2021-05-10 RX ORDER — SODIUM CHLORIDE 0.9 % (FLUSH) 0.9 %
10 SYRINGE (ML) INJECTION PRN
Status: CANCELLED | OUTPATIENT
Start: 2021-05-10

## 2021-05-10 RX ADMIN — SODIUM CHLORIDE, PRESERVATIVE FREE 20 ML: 5 INJECTION INTRAVENOUS at 14:07

## 2021-05-10 RX ADMIN — HEPARIN 500 UNITS: 100 SYRINGE at 14:07

## 2021-05-13 LAB — CA 27.29: 10.8 U/ML (ref 0–40)

## 2021-05-17 ENCOUNTER — HOSPITAL ENCOUNTER (OUTPATIENT)
Dept: INFUSION THERAPY | Age: 76
Discharge: HOME | End: 2021-05-17
Payer: MEDICARE

## 2021-05-17 ENCOUNTER — OFFICE VISIT (OUTPATIENT)
Dept: ONCOLOGY | Age: 76
End: 2021-05-17
Payer: MEDICARE

## 2021-05-17 VITALS
RESPIRATION RATE: 16 BRPM | DIASTOLIC BLOOD PRESSURE: 66 MMHG | SYSTOLIC BLOOD PRESSURE: 135 MMHG | TEMPERATURE: 97 F | WEIGHT: 205 LBS | HEIGHT: 66 IN | HEART RATE: 94 BPM | OXYGEN SATURATION: 97 % | BODY MASS INDEX: 32.95 KG/M2

## 2021-05-17 DIAGNOSIS — C50.411 MALIGNANT NEOPLASM OF UPPER-OUTER QUADRANT OF RIGHT BREAST IN FEMALE, ESTROGEN RECEPTOR POSITIVE (HCC): Primary | ICD-10-CM

## 2021-05-17 DIAGNOSIS — C50.911 CHEST WALL RECURRENCE OF BREAST CANCER, RIGHT (HCC): ICD-10-CM

## 2021-05-17 DIAGNOSIS — Z17.0 MALIGNANT NEOPLASM OF UPPER-OUTER QUADRANT OF RIGHT BREAST IN FEMALE, ESTROGEN RECEPTOR POSITIVE (HCC): Primary | ICD-10-CM

## 2021-05-17 DIAGNOSIS — C79.89 CHEST WALL RECURRENCE OF BREAST CANCER, RIGHT (HCC): ICD-10-CM

## 2021-05-17 PROCEDURE — 99211 OFF/OP EST MAY X REQ PHY/QHP: CPT

## 2021-05-17 PROCEDURE — 99214 OFFICE O/P EST MOD 30 MIN: CPT | Performed by: INTERNAL MEDICINE

## 2021-05-17 RX ORDER — POTASSIUM CHLORIDE 20 MEQ/1
20 TABLET, EXTENDED RELEASE ORAL DAILY
Qty: 30 TABLET | Refills: 5 | Status: SHIPPED | OUTPATIENT
Start: 2021-05-17 | End: 2021-12-06

## 2021-05-17 ASSESSMENT — PATIENT HEALTH QUESTIONNAIRE - PHQ9
SUM OF ALL RESPONSES TO PHQ QUESTIONS 1-9: 0
2. FEELING DOWN, DEPRESSED OR HOPELESS: 0
SUM OF ALL RESPONSES TO PHQ9 QUESTIONS 1 & 2: 0
1. LITTLE INTEREST OR PLEASURE IN DOING THINGS: 0
SUM OF ALL RESPONSES TO PHQ QUESTIONS 1-9: 0

## 2021-05-17 NOTE — PROGRESS NOTES
Nilson Jesus Name: Kelly Briones  Patient : 1945  Patient MRN: [id number]     Primary Oncologist: Tammy Zhao MD  PCP:Dr Kayley Valdez  Surgeon Dr Vickie Arreaga     Date of Service: 21    Chief Complaint:   Chief Complaint   Patient presents with    Discuss Labs        Active Problem list   Breast cancer    HPI  1. Mrs. Mirza Moya ( 45) has a history of right breast cancer, for which she had modified radical mastectomy in . She had a couple of local recurrences, treated with chemotherapy and radiation therapy and excision of the chest wall recurrences. 2. In  she had a total abdominal hysterectomy/bilateral salpingo-oophorectomy, which was done as part of hormonal therapy and Tamoxifen for one year. After that she did not show any evidence of recurrence. 3. In  she presented with right axillary mass which was biopsied and confirmed to be an invasive carcinoma, morphologically consistent with origin from her breast primary. Her ER was positive 90 percent, IN positive. HER-2/naomi was negative by FISH. 4. All other w/u including CT of the chest, abdomen and pelvis showed no evidence of any intraabdominal mass or metastases. Bone scan was negative for any bony mets. Her MUGA scan showed 61 percent ejection fraction. 5. She was started on Taxol d1&8 [id number] for 6 courses starting in Dec of 2012 till 2013 and showed good partial response with mass size reduced to 2.4 cm in  from 3 cm in 2012 and completed 6 courses in 2013. Right Ax mass completely resolved over next couple years. 6. In May 2013 she was initiated on Hormonal therapy with Arimidex 1 mg daily for maintenance treatment. 7. In continued Clinical remission. 2017 Hemoglobin 14.3, hematocrit 42, white count 5,900, platelet 351,078, BUN 17, creatinine 0.7. Sodium was 141, potassium 3.4, chloride 95, calcium 10.0. The rest of the chemistries are normal. LFTs normal, , uric acid 6.0.  CEA 1.6, CA 27 15.     11-17 DEXA scan showed osteopenia, CT chest abdomen pelvis showed no evidence of metastatic disease, fatty liver, right renal nonobstructing parapelvic cyst, diverticulosis. Nuclear med bone scan showed no evidence to suggest osseous metastatic disease. June 2018 Clearance Darrius started but never got more than 1  July 2018 Left breast with no mammographic evidence of malignancy  july 2019 Clearance Darrius restarted but got only one dose again  9-2019 stopped anastrazole for bone pain/arthritic pains, which got better, then placed on letrozole but made her feel bad so she stopped it. 10-19 restarted anastrazole   Feb 2020 stopped AI  Feb 2020 Mammogram normal BIRADS cat 2  June 2020 restarted taking anastrazole. 7/9/2020 limited right breast axillary ultrasound revealed highly suspicious right breast 9:00 mass correlating to the palpable complaint. 7/14/2020 right breast area mass biopsy revealed invasive ductal carcinoma grade 2. ER greater than 95% positive. MA negative. HER-2 by IHC negative HER-2 by FISH pending. 7/16/2026 PET scan revealed metabolically active nodule along the inferior lateral margin of the right mastectomy site concerning for recurrent disease. This has slowly enlarged since 11/21/2017. No other metabolically active disease. 8/7/20: underwent excision with skin flap. Tumor size was 3cm, Grade 2, DCIS/LCIS not present. ER positive >95%, MA negative and her 2 neg by IHC and FISH. Margins <1mm     No recs for adjuvant XRT per Dr Chantale Sheridan. Resumed AI September 2020    PAST MEDICAL HISTORY:   1. Hypertension since 1980s.,  2. Asthma since 2008.,  3. Allergies to mold, animals, dust, et Dyana Ariela, seem to bother her ,  4. Arthritis since early 2000 involving her knee and neck. ,  5. Colonoscopy done in 2011 with biopsy of transverse colon showed benign mucosal lymphoid aggregates and a tubular adenoma from the ascending colon. The rest was negative. (Done by Dr. Deric Qureshi.) ,  6.  CAT scan of the chest, which showed a 3 cm anterior chest wall nodule unchanged from 2011, also present in 2009, when it was 2.5 cm.,  7. Chest x-ray: 2015 No acute cardiopulmonary findings. ,  8. Mammogram of the left breast was negative for any lesions. PAST SURGICAL HISTORY:   1. History of breast cancer as listed above. 2. Squamous cell cancer removed from the leg in . 3. Cholecystectomy in . 4. Heel spur surgery .,  5. Right Total knee replacement in 2011.  6. Lesion removed from her left arm in . 7. Joint replacement in her thumb in May of 2012. 8. Cystic lesions removed by Dr Madhuri Sanders from her finger Sep 2014. 9. ear surgery 2015     FAMILY HISTORY:   Both father and mother had coronary artery disease. There is also a history of cancer of the colon and liver in the family. Sister had hepatitis C.     SOCIAL HISTORY:   She has never smoked. She is a nondrinker. She is  since . Her   of a heart attack. 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 Interval History   21: Alone to the clinic today. Reported that she feels dizzy when she turns her neck. Undergoing physical therapy for that. No fever or any cough. Denied any chest pain, increased shortness of breath, palpitations. Reported that she is compliant with anastrozole and is tolerating fairly well. Denied any abdominal pain, nausea, vomiting,constipation. Denied any  symptoms. LE edema bilaterally and has been using compressor which helps a lot. No new masses.     Review of Systems  Per interval history; otherwise 10 point ROS is negative      Vital Signs:  /66 (Site: Right Upper Arm, Position: Sitting, Cuff Size: Large Adult)   Pulse 94   Temp 97 °F (36.1 °C) (Infrared)   Resp 16   Ht 5' 5.5\" (1.664 m)   Wt 205 lb (93 kg)   SpO2 97%   BMI 33.59 kg/m²     Physical Exam:      CONSTITUTIONAL: awake, alert, obese, ambulates with a walker   EYES: mahsa, no palor or any icetrus   ENT: ATNC   NECK: no JVD   HEMATOLOGIC/LYMPHATIC: no cervical, supraclavicular or axillary lymphadenopathy   LUNGS: ctab  Breast; Rt mastectomy.  Right axillary scar very well healed, no pain, no discharge  CARDIOVASCULAR: s1s2 rrr no murmurs   ABDOMEN: soft ntnd bs pos  NEUROLOGIC:GI   SKIN: No rash   EXTREMITIES:Bilateral LE edema 2-3+      Labs:    Hematology:  Lab Results   Component Value Date    WBC 5.7 05/10/2021    RBC 3.98 (L) 05/10/2021    HGB 14.1 05/10/2021    HCT 40.3 05/10/2021    .3 (H) 05/10/2021    MCH 35.4 (H) 05/10/2021    MCHC 35.0 05/10/2021    RDW 12.2 05/10/2021     05/10/2021    MPV 9.8 05/10/2021    BANDSPCT 2 (L) 11/21/2017    SEGSPCT 64.0 05/10/2021    EOSRELPCT 1.7 05/10/2021    BASOPCT 0.3 05/10/2021    LYMPHOPCT 23.4 (L) 05/10/2021    MONOPCT 10.6 (H) 05/10/2021    BANDABS 0.10 11/21/2017    SEGSABS 3.7 05/10/2021    EOSABS 0.1 05/10/2021    BASOSABS 0.0 05/10/2021    LYMPHSABS 1.3 05/10/2021    MONOSABS 0.6 05/10/2021    DIFFTYPE AUTOMATED DIFFERENTIAL 05/10/2021    POLYCHROM 1+ 12/12/2016    PLTM FEW 12/12/2016     Lab Results   Component Value Date    ESR 12 03/08/2017       Chemistry:  Lab Results   Component Value Date     05/10/2021    K 3.4 (L) 05/10/2021     05/10/2021    CO2 32 05/10/2021    BUN 21 05/10/2021    CREATININE 0.6 05/10/2021    GLUCOSE 111 (H) 05/10/2021    CALCIUM 10.1 05/10/2021    PROT 6.6 05/10/2021    LABALBU 4.5 05/10/2021    BILITOT 0.3 05/10/2021    ALKPHOS 64 05/10/2021    AST 18 05/10/2021    ALT 20 05/10/2021    LABGLOM >60 05/10/2021    GFRAA >60 05/10/2021    PHOS 1.9 (L) 09/09/2019    MG 2.0 12/14/2020    POCGLU 90 12/14/2020     Lab Results   Component Value Date     10/17/2017     No components found for: LD  Lab Results   Component Value Date    TSHHS 2.780 12/15/2020    T4FREE 1.32 12/15/2020    FT3 3.2 04/27/2017       Immunology:  Lab Results   Component Value Date PROT 6.6 05/10/2021     No results found for: Ana Rosa Right, KLFLCR  No results found for: B2M    Coagulation Panel:  Lab Results   Component Value Date    PROTIME 10.9 (L) 12/14/2020    INR 0.90 12/14/2020    APTT 23.8 04/27/2017    DDIMER 919 (H) 12/12/2016       Anemia Panel:  Lab Results   Component Value Date    HTBRYXAB54 579.2 12/15/2020    FOLATE 19.1 (H) 12/15/2020       Tumor Markers:  Lab Results   Component Value Date    CEA 2.1 01/22/2019    LABCA2 10.8 05/10/2021         Imaging: Reviewed     Pathology:Reviewed     Observations:  Performance Status: ECOG 2   Depression Status: PHQ 9 neg       Assessment & Plan:  1. Ca Breast 1972 with Right Ax Recurrence Nov 2012   Initial diagnosis in 1972 when she had right modified radical mastectomy. She had isolated local recurrences in the early eighties, for which she had excision, followed by radiation therapy and hormonal therapy. After being off treatment for many years, she had another recurrence in November of 2012 and after excision, she had minimal residual disease and has been on maintenance Arimidex for five years after initial chemotherapy with a taxane. Dr Melvin Golden recommendation was to keep her on hormonal therapy for at least 10 years or even longer. Has been off of AI intermittently for bone pain, last time since feb 2020. Resumed Luma 15 2020   PET with avid right axillary mass, biopsy-proven invasive ductal carcinoma IA ER positive IA negative and HER-2 negative. No other metastatic disease. S/P surgical resection  No recs for any advunat XRT  Plan to Continue AI and monitor for AE. Ca 27-29 feb 2021 12, CBC and CMP normal  Dexa scan 11/2017 with osteopenia. Recommend continuation of calcium and vitamin D. Repeat dexa scan October 2020 with osteopenia in the hips. Mammogram left ordered    LE edema:Compressor helping .  Is on lasix, Compression stockings and also recommend feet elevation    Requesting removal, consult placed for Dr. Jade Benites. cholesteotoma? ??: follow with [facility]    Mild hypokalemia:  Continue K supplements OD as she is on lasix    Macrocytosis without anemia: Continue to monitor    Continue other medical care    Discussed the above findings and plan with her and she verbalized understanding    Discussed Lifestyle, healthy diet and regular exercise as tolerated. Also discussed importance of being up-to-date with age-appropriate screening tools, Left mammogram due again feb 2021. Recommend follow-up with primary care physician and other specialists. Please do not hesitate to contact us if you need ay further information. Return to clinic in  3months  or earlier if new symptoms. I have recommended that the patient follow CDC guidelines for prevention of COVID-19 infection.       [id number] Jensen

## 2021-05-20 ENCOUNTER — CLINICAL DOCUMENTATION (OUTPATIENT)
Dept: ONCOLOGY | Age: 76
End: 2021-05-20

## 2021-05-20 DIAGNOSIS — Z12.31 SCREENING MAMMOGRAM FOR HIGH-RISK PATIENT: Primary | ICD-10-CM

## 2021-08-05 ENCOUNTER — HOSPITAL ENCOUNTER (OUTPATIENT)
Age: 76
Discharge: HOME | End: 2021-08-05
Payer: MEDICARE

## 2021-08-05 LAB
SARS-COV-2: NOT DETECTED
SOURCE: NORMAL

## 2021-08-05 PROCEDURE — U0003 INFECTIOUS AGENT DETECTION BY NUCLEIC ACID (DNA OR RNA); SEVERE ACUTE RESPIRATORY SYNDROME CORONAVIRUS 2 (SARS-COV-2) (CORONAVIRUS DISEASE [COVID-19]), AMPLIFIED PROBE TECHNIQUE, MAKING USE OF HIGH THROUGHPUT TECHNOLOGIES AS DESCRIBED BY CMS-2020-01-R: HCPCS

## 2021-08-05 PROCEDURE — U0005 INFEC AGEN DETEC AMPLI PROBE: HCPCS

## 2021-08-06 NOTE — PROGRESS NOTES
Surgery  8/12/21 [facility] 0915 ARRIVE 0715  you will be called if times change               1. Do not eat or drink anything after midnight - unless instructed by your doctor prior to surgery. This includes no water, chewing gum or mints. 2. Follow your directions as prescribed by the doctor for your procedure and medications. TAKE ATENOLOL AND PRILOSEC WITH SIP OF WATER MORNING OF PROCEDURE   3. Check with your Doctor regarding stopping Plavix, Coumadin, Lovenox,Effient,Pradaxa,Xarelto, Fragmin or other blood thinners and follow their instructions. 4. Do not smoke, and do not drink any alcoholic beverages 24 hours prior to surgery. This includes NA Beer. 5. You may brush your teeth and gargle the morning of surgery. DO NOT SWALLOW WATER   6. You MUST make arrangements for a responsible adult to take you home after your surgery and be able to check on you every couple hours for the day. You will not be allowed to leave alone or drive yourself home. It is strongly suggested someone stay with you the first 24 hrs. Your surgery will be cancelled if you do not have a ride home. 7. Please wear simple, loose fitting clothing to the hospital.  Lorena Jessica not bring valuables (money, credit cards, checkbooks, etc.) Do not wear any makeup (including no eye makeup) or nail polish on your fingers or toes. 8. DO NOT wear any jewelry or piercings on day of surgery. All body piercing jewelry must be removed. 9. If you have dentures, they will be removed before going to the OR; we will provide you a container. If you wear contact lenses or glasses, they will be removed; please bring a case for them. 10. If you  have a Living Will and Durable Power of  for Healthcare, please bring in a copy. 11. Please bring picture ID,  insurance card, paperwork from the doctors office (H & P, Consent, & card for implantable devices).            12. Take a shower the night before or morning of your procedure, do not apply any lotion, oil or powder. 13. Wear a mask covering your nose & mouth when entering the hospital. PATIENT  W. Jayant Camacho.

## 2021-08-11 ENCOUNTER — ANESTHESIA EVENT (OUTPATIENT)
Dept: ENDOSCOPY | Age: 76
End: 2021-08-11
Payer: MEDICARE

## 2021-08-11 NOTE — PROGRESS NOTES
Spoke with pt. Confirmed pt. Procedure tomorrow for 8/12/21 at 0915. Arrival time of 0715 given. She denied any other questions or concerns.

## 2021-08-11 NOTE — ANESTHESIA PRE PROCEDURE
Department of Anesthesiology  Preprocedure Note       Name:  Francesco Abdalla   Age:  68 y.o.  :  1945                                          MRN:  [id number]         Date:  2021      Surgeon: Naomi Bell):  Betzy Corrales MD    Procedure: Procedure(s):  COLONOSCOPY DIAGNOSTIC    Medications prior to admission:   Prior to Admission medications    Medication Sig Start Date End Date Taking?  Authorizing Provider   MI-ACID GAS RELIEF 80 MG chewable tablet chew 1 tablet after the first part of prep the night before procedure then chew 3 tablets after last part of prep on the morning of procedur 21   Historical Provider, MD   PLENVU 140 g SOLR mix FIRST BOTTLE (maged) with 10oz CLEAR liquid AND drink AT 6pm THE night BEFORE test AND REPEAT with 2nd BOTTLE (fruit punch) FIVE hours B 21   Historical Provider, MD   potassium chloride (KLOR-CON M) 20 MEQ extended release tablet Take 1 tablet by mouth daily 21   Usha Kwon MD   hydroCHLOROthiazide (HYDRODIURIL) 25 MG tablet Take 25 mg by mouth daily    Historical Provider, MD   calcium carbonate 600 MG TABS tablet Take 1 tablet by mouth daily    Historical Provider, MD   Ascorbic Acid (VITAMIN C) 500 MG CAPS Take 1 capsule by mouth daily    Historical Provider, MD   diphenhydrAMINE (ALLERGY RELIEF) 25 MG tablet Take 25 mg by mouth daily    Historical Provider, MD   NONFORMULARY Relief factor 3 packs qd    Historical Provider, MD   furosemide (LASIX) 20 MG tablet Take 1 tablet by mouth daily 21   Neal Bhakta MD   anastrozole (ARIMIDEX) 1 MG tablet Take 1 tablet by mouth daily 21  Usha Kwon MD   loperamide (IMODIUM A-D) 2 MG tablet take 2 tablet by oral route after 1st loose stool and 1 tablet (2 mg) after each next bowel movement; do not exceed 16 mg in 24hrs    Historical Provider, MD   B Complex-C (SUPER B COMPLEX PO) Take 1 tablet by mouth daily    Historical Provider, MD   allopurinol (ZYLOPRIM) 100 MG tablet Take 100 mg by mouth daily    Historical Provider, MD   acetaminophen (TYLENOL 8 HOUR) 650 MG extended release tablet Take 650 mg by mouth 3 times daily     Historical Provider, MD   Cholecalciferol (D3 ADULT PO) Take 1 tablet by mouth daily    Historical Provider, MD   polyethyl glycol-propyl glycol 0.4-0.3 % (SYSTANE) 0.4-0.3 % ophthalmic solution 1 drop nightly. Historical Provider, MD   atenolol (TENORMIN) 25 MG tablet Take 12.5 mg by mouth daily     Historical Provider, MD   omeprazole (PRILOSEC) 20 MG capsule Take 20 mg by mouth daily. Historical Provider, MD   bismuth subsalicylate (PEPTO BISMOL) 262 MG/15ML suspension Take 15 mLs by mouth daily as needed. Historical Provider, MD       Current medications:    No current facility-administered medications for this encounter.      Current Outpatient Medications   Medication Sig Dispense Refill    MI-ACID GAS RELIEF 80 MG chewable tablet chew 1 tablet after the first part of prep the night before procedure then chew 3 tablets after last part of prep on the morning of procedur      PLENVU 140 g SOLR mix FIRST BOTTLE (maged) with 10oz CLEAR liquid AND drink AT 6pm THE night BEFORE test AND REPEAT with 2nd BOTTLE (fruit punch) FIVE hours B      potassium chloride (KLOR-CON M) 20 MEQ extended release tablet Take 1 tablet by mouth daily 30 tablet 5    hydroCHLOROthiazide (HYDRODIURIL) 25 MG tablet Take 25 mg by mouth daily      calcium carbonate 600 MG TABS tablet Take 1 tablet by mouth daily      Ascorbic Acid (VITAMIN C) 500 MG CAPS Take 1 capsule by mouth daily      diphenhydrAMINE (ALLERGY RELIEF) 25 MG tablet Take 25 mg by mouth daily      NONFORMULARY Relief factor 3 packs qd      furosemide (LASIX) 20 MG tablet Take 1 tablet by mouth daily 60 tablet 12    anastrozole (ARIMIDEX) 1 MG tablet Take 1 tablet by mouth daily 90 tablet 3    loperamide (IMODIUM A-D) 2 MG tablet take 2 tablet by oral route after 1st loose stool and 1 tablet (2 mg) after each next bowel movement; do not exceed 16 mg in 24hrs      B Complex-C (SUPER B COMPLEX PO) Take 1 tablet by mouth daily      allopurinol (ZYLOPRIM) 100 MG tablet Take 100 mg by mouth daily      acetaminophen (TYLENOL 8 HOUR) 650 MG extended release tablet Take 650 mg by mouth 3 times daily       Cholecalciferol (D3 ADULT PO) Take 1 tablet by mouth daily      polyethyl glycol-propyl glycol 0.4-0.3 % (SYSTANE) 0.4-0.3 % ophthalmic solution 1 drop nightly.  atenolol (TENORMIN) 25 MG tablet Take 12.5 mg by mouth daily       omeprazole (PRILOSEC) 20 MG capsule Take 20 mg by mouth daily.  bismuth subsalicylate (PEPTO BISMOL) 262 MG/15ML suspension Take 15 mLs by mouth daily as needed. Allergies:     Allergies   Allergen Reactions    Grass Extracts [Gramineae Pollens]     Adhesive Tape Dermatitis and Rash    Antivert [Meclizine Hcl] Rash    Atorvastatin Calcium Other (See Comments)     Other reaction(s): joint pain    Meclizine Rash     Rash    Meclizine Hcl Rash    Monascus Purpureus Went Yeast Rash     Other reaction(s): rash       Problem List:    Patient Active Problem List   Diagnosis Code    Foreign body of right thigh S70.351A    Back pain with left-sided sciatica M54.32    Snoring R06.83    Hypersomnolence G47.10    Malignant neoplasm of upper-outer quadrant of right female breast ([facility] C50.411    Other specified disorders of bone, unspecified site M89.8X9    Other specified complication of vascular prosthetic devices, implants and grafts, initial encounter ([facility] T82.898A    Disorder of phosphorus metabolism, 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)     \"dx with right breast cancer 1972- tx with surgery and tx with chemo and radiation- then 2013 it came  back in the same breast- tx with chemo -follows with Dr Gilmar Wynn 2020 and taking chemo again    Hiatal hernia     History of blood transfusion     History of external beam radiation therapy 1982    right chest wall    History of motion sickness     History of UTI     last one UTI    Hyperlipidemia     Hypertension     IBS (irritable bowel syndrome)     Lumbar stenosis     PONV (postoperative nausea and vomiting)     Reflux     Stress incontinence     Thyroid nodule     for bx 4/27/2017       Past Surgical History:        Procedure Laterality Date    APPENDECTOMY      took with the hyster    AXILLARY SURGERY Right 08/07/2020    right lateral chest wall/axillary mass excision per Dr. Nilda Aquino at [address] [place] [address]  2004    COLONOSCOPY  2011    COLONOSCOPY  04/20/2017    Normal    DILATION AND CURETTAGE OF UTERUS  40+ yr ago    after miscarriage     FINGER SURGERY Left 2014    index finger - removed a nodule    FOREARM SURGERY Right 1998     fx  repair with pin placement    HAND TENDON SURGERY  2012    Tendon removed left arm placed left thumb    HEEL SPUR SURGERY Bilateral right    done in 2015 and right done 2013    HYSTERECTOMY  age 45    \"took ovaries and appendix also\"    JOINT REPLACEMENT  right    knee- done 2011    JOINT REPLACEMENT  left    knee- done 12/2016    MASTECTOMY, RADICAL Right 1972    took one lymph node and was positive    OTHER SURGICAL HISTORY  04/27/2017    Thyroid biopsy    OTHER SURGICAL HISTORY Right 1982    right axillary mass bx - breast CA recurrence    SKIN BIOPSY      TUNNELED VENOUS PORT PLACEMENT  2013    left side of chest       Social History:    Social History     Tobacco Use    Smoking status: Never Smoker    Smokeless tobacco: Never Used   Substance Use Topics    complications: PONV. Airway: Mallampati: II  TM distance: >3 FB   Neck ROM: full  Mouth opening: > = 3 FB Dental:          Pulmonary:normal exam    (+) asthma:                            Cardiovascular:    (+) hypertension:, CHF:,          Beta Blocker:  Dose within 24 Hrs         Neuro/Psych:   (+) neuromuscular disease:,             GI/Hepatic/Renal:   (+) hiatal hernia, GERD:, morbid obesity          Endo/Other:    (+) : arthritis: OA., electrolyte abnormalities, malignancy/cancer (breast, right). Abdominal:   (+) obese,           Vascular: Other Findings:           Anesthesia Plan      MAC and general     ASA 3     (Chart review)  Induction: intravenous. Anesthetic plan and risks discussed with patient. LISA Fournier CRNA   8/11/2021  Pre Anesthesia Evaluation complete. Anesthesia plan, risks, benefits, alternatives, and personnel discussed with patient and/or legal guardian. Patient and/or legal guardian verbalized an understanding and agreed to proceed. Anesthesia plan discussed with care team members and agreed upon.   LISA Dobson - TERESSA  8/12/2021

## 2021-08-12 ENCOUNTER — HOSPITAL ENCOUNTER (OUTPATIENT)
Age: 76
Setting detail: OUTPATIENT SURGERY
Discharge: HOME | End: 2021-08-12
Attending: INTERNAL MEDICINE | Admitting: INTERNAL MEDICINE
Payer: MEDICARE

## 2021-08-12 ENCOUNTER — ANESTHESIA (OUTPATIENT)
Dept: ENDOSCOPY | Age: 76
End: 2021-08-12
Payer: MEDICARE

## 2021-08-12 VITALS
DIASTOLIC BLOOD PRESSURE: 84 MMHG | OXYGEN SATURATION: 99 % | SYSTOLIC BLOOD PRESSURE: 133 MMHG | BODY MASS INDEX: 31.65 KG/M2 | TEMPERATURE: 97.9 F | HEART RATE: 95 BPM | WEIGHT: 190 LBS | RESPIRATION RATE: 18 BRPM | HEIGHT: 65 IN

## 2021-08-12 VITALS
SYSTOLIC BLOOD PRESSURE: 125 MMHG | OXYGEN SATURATION: 99 % | DIASTOLIC BLOOD PRESSURE: 73 MMHG | RESPIRATION RATE: 18 BRPM

## 2021-08-12 PROCEDURE — 7100000010 HC PHASE II RECOVERY - FIRST 15 MIN: Performed by: INTERNAL MEDICINE

## 2021-08-12 PROCEDURE — 2709999900 HC NON-CHARGEABLE SUPPLY: Performed by: INTERNAL MEDICINE

## 2021-08-12 PROCEDURE — 88305 TISSUE EXAM BY PATHOLOGIST: CPT

## 2021-08-12 PROCEDURE — 2500000003 HC RX 250 WO HCPCS: Performed by: NURSE ANESTHETIST, CERTIFIED REGISTERED

## 2021-08-12 PROCEDURE — 3609010300 HC COLONOSCOPY W/BIOPSY SINGLE/MULTIPLE: Performed by: INTERNAL MEDICINE

## 2021-08-12 PROCEDURE — 2580000003 HC RX 258: Performed by: ANESTHESIOLOGY

## 2021-08-12 PROCEDURE — 3700000000 HC ANESTHESIA ATTENDED CARE: Performed by: INTERNAL MEDICINE

## 2021-08-12 PROCEDURE — 6360000002 HC RX W HCPCS: Performed by: NURSE ANESTHETIST, CERTIFIED REGISTERED

## 2021-08-12 PROCEDURE — 2580000003 HC RX 258: Performed by: NURSE ANESTHETIST, CERTIFIED REGISTERED

## 2021-08-12 PROCEDURE — 7100000011 HC PHASE II RECOVERY - ADDTL 15 MIN: Performed by: INTERNAL MEDICINE

## 2021-08-12 PROCEDURE — 3700000001 HC ADD 15 MINUTES (ANESTHESIA): Performed by: INTERNAL MEDICINE

## 2021-08-12 RX ORDER — PROPOFOL 10 MG/ML
INJECTION, EMULSION INTRAVENOUS PRN
Status: DISCONTINUED | OUTPATIENT
Start: 2021-08-12 | End: 2021-08-12 | Stop reason: SDUPTHER

## 2021-08-12 RX ORDER — SODIUM CHLORIDE, SODIUM LACTATE, POTASSIUM CHLORIDE, CALCIUM CHLORIDE 600; 310; 30; 20 MG/100ML; MG/100ML; MG/100ML; MG/100ML
INJECTION, SOLUTION INTRAVENOUS CONTINUOUS
Status: DISCONTINUED | OUTPATIENT
Start: 2021-08-12 | End: 2021-08-12 | Stop reason: HOSPADM

## 2021-08-12 RX ORDER — SODIUM CHLORIDE, SODIUM LACTATE, POTASSIUM CHLORIDE, CALCIUM CHLORIDE 600; 310; 30; 20 MG/100ML; MG/100ML; MG/100ML; MG/100ML
INJECTION, SOLUTION INTRAVENOUS CONTINUOUS PRN
Status: DISCONTINUED | OUTPATIENT
Start: 2021-08-12 | End: 2021-08-12 | Stop reason: SDUPTHER

## 2021-08-12 RX ORDER — LIDOCAINE HYDROCHLORIDE 20 MG/ML
INJECTION, SOLUTION EPIDURAL; INFILTRATION; INTRACAUDAL; PERINEURAL PRN
Status: DISCONTINUED | OUTPATIENT
Start: 2021-08-12 | End: 2021-08-12 | Stop reason: SDUPTHER

## 2021-08-12 RX ADMIN — PHENYLEPHRINE HYDROCHLORIDE 100 MCG: 10 INJECTION INTRAVENOUS at 10:00

## 2021-08-12 RX ADMIN — SODIUM CHLORIDE, POTASSIUM CHLORIDE, SODIUM LACTATE AND CALCIUM CHLORIDE: 600; 310; 30; 20 INJECTION, SOLUTION INTRAVENOUS at 09:39

## 2021-08-12 RX ADMIN — SODIUM CHLORIDE, POTASSIUM CHLORIDE, SODIUM LACTATE AND CALCIUM CHLORIDE: 600; 310; 30; 20 INJECTION, SOLUTION INTRAVENOUS at 08:08

## 2021-08-12 RX ADMIN — LIDOCAINE HYDROCHLORIDE 50 MG: 20 INJECTION, SOLUTION EPIDURAL; INFILTRATION; INTRACAUDAL; PERINEURAL at 09:42

## 2021-08-12 RX ADMIN — PROPOFOL 50 MG: 10 INJECTION, EMULSION INTRAVENOUS at 09:42

## 2021-08-12 RX ADMIN — PROPOFOL 20 MG: 10 INJECTION, EMULSION INTRAVENOUS at 09:49

## 2021-08-12 RX ADMIN — LIDOCAINE HYDROCHLORIDE 50 MG: 20 INJECTION, SOLUTION EPIDURAL; INFILTRATION; INTRACAUDAL; PERINEURAL at 09:43

## 2021-08-12 RX ADMIN — PROPOFOL 20 MG: 10 INJECTION, EMULSION INTRAVENOUS at 09:45

## 2021-08-12 RX ADMIN — PROPOFOL 20 MG: 10 INJECTION, EMULSION INTRAVENOUS at 09:54

## 2021-08-12 RX ADMIN — PHENYLEPHRINE HYDROCHLORIDE 100 MCG: 10 INJECTION INTRAVENOUS at 09:58

## 2021-08-12 ASSESSMENT — PAIN SCALES - GENERAL
PAINLEVEL_OUTOF10: 0
PAINLEVEL_OUTOF10: 0

## 2021-08-12 ASSESSMENT — PAIN - FUNCTIONAL ASSESSMENT: PAIN_FUNCTIONAL_ASSESSMENT: 0-10

## 2021-08-12 NOTE — H&P
one lymph node and was positive    OTHER SURGICAL HISTORY  04/27/2017    Thyroid biopsy    OTHER SURGICAL HISTORY Right 1982    right axillary mass bx - breast CA recurrence    SKIN BIOPSY      TUNNELED VENOUS PORT PLACEMENT  2013    left side of chest         Current Medications:    Medications    Prior to Admission medications    Medication Sig Start Date End Date Taking?  Authorizing Provider   PLENVU 140 g SOLR mix FIRST BOTTLE (maged) with 10oz CLEAR liquid AND drink AT 6pm THE night BEFORE test AND REPEAT with 2nd BOTTLE (fruit punch) FIVE hours B 5/6/21  Yes Historical Provider, MD   potassium chloride (KLOR-CON M) 20 MEQ extended release tablet Take 1 tablet by mouth daily 5/17/21  Yes Sweetie Luciano MD   hydroCHLOROthiazide (HYDRODIURIL) 25 MG tablet Take 25 mg by mouth daily   Yes Historical Provider, MD   calcium carbonate 600 MG TABS tablet Take 1 tablet by mouth daily   Yes Historical Provider, MD   Ascorbic Acid (VITAMIN C) 500 MG CAPS Take 1 capsule by mouth daily   Yes Historical Provider, MD   diphenhydrAMINE (ALLERGY RELIEF) 25 MG tablet Take 25 mg by mouth daily   Yes Historical Provider, MD   NONFORMULARY Relief factor 3 packs qd   Yes Historical Provider, MD   furosemide (LASIX) 20 MG tablet Take 1 tablet by mouth daily 2/19/21  Yes Hermelindo Rosas MD   anastrozole (ARIMIDEX) 1 MG tablet Take 1 tablet by mouth daily 2/16/21 8/12/21 Yes Sweetie Luciano MD   loperamide (IMODIUM A-D) 2 MG tablet take 2 tablet by oral route after 1st loose stool and 1 tablet (2 mg) after each next bowel movement; do not exceed 16 mg in 24hrs   Yes Historical Provider, MD   B Complex-C (SUPER B COMPLEX PO) Take 1 tablet by mouth daily   Yes Historical Provider, MD   allopurinol (ZYLOPRIM) 100 MG tablet Take 100 mg by mouth daily   Yes Historical Provider, MD   acetaminophen (TYLENOL 8 HOUR) 650 MG extended release tablet Take 650 mg by mouth 3 times daily    Yes Historical Provider, MD   Cholecalciferol (D3 ADULT PO) Take 1 tablet by mouth daily   Yes Historical Provider, MD   atenolol (TENORMIN) 25 MG tablet Take 12.5 mg by mouth daily    Yes Historical Provider, MD   omeprazole (PRILOSEC) 20 MG capsule Take 20 mg by mouth daily. Yes Historical Provider, MD   bismuth subsalicylate (PEPTO BISMOL) 262 MG/15ML suspension Take 15 mLs by mouth daily as needed. Yes Historical Provider, MD TRIVEDI-ACID GAS RELIEF 80 MG chewable tablet chew 1 tablet after the first part of prep the night before procedure then chew 3 tablets after last part of prep on the morning of procedur 5/5/21   Historical Provider, MD   polyethyl glycol-propyl glycol 0.4-0.3 % (SYSTANE) 0.4-0.3 % ophthalmic solution 1 drop nightly. Historical Provider, MD      Scheduled Medications:   Infusions:    lactated ringers 100 mL/hr at 08/12/21 0808     PRN Medications: Allergies: Allergies   Allergen Reactions    Grass Extracts [Gramineae Pollens]     Adhesive Tape Dermatitis and Rash    Antivert [Meclizine Hcl] Rash    Atorvastatin Calcium Other (See Comments)     Other reaction(s): joint pain    Meclizine Rash     Rash    Meclizine Hcl Rash    Monascus Purpureus Went Yeast Rash     Other reaction(s): rash         Allergies:  Grass extracts [gramineae pollens], Adhesive tape, Antivert [meclizine hcl], Atorvastatin calcium, Meclizine, Meclizine hcl, and Monascus purpureus went yeast    Social History:   TOBACCO:   reports that she has never smoked. She has never used smokeless tobacco.  ETOH:   reports no history of alcohol use.     Family History:       Problem Relation Age of Onset    Emphysema Mother     Heart Disease Mother     Heart Disease Father         SHIKHA Lucas Liver Disease Sister     Depression Sister     Heart Disease Sister     Cancer Paternal Aunt     Cancer Paternal Uncle     Cancer Maternal Grandmother        REVIEW OF SYSTEMS:    Negative except for HPI        PHYSICAL EXAM:      Vitals:    BP (!) 145/83   Pulse 113

## 2021-08-12 NOTE — OP NOTE
Operative Note      Patient: Victoria Aguiar  YOB: 1945  MRN: [id number]    Date of Procedure: 8/12/2021    Pre-Op Diagnosis: PERONAL H/O COLON POLYPS      [address] [facility]      BRIEF OP REPORT:    Impression:    1) millimeter polyp ascending colon biopsy ofF   2) pancolonic mild diverticulosis   3) grade 2 hemorrhoids otherwise normal colonoscopy        Suggest:   1) high-fiber diet   2) call 5 years follow-up 4 weeks   3) can resume all medications     Full EGD/COLONOSCOPY report available by going to \"chart review\" then \"procedures\" then  \"EGD/Colonoscopy\"  then \"View Endoscopy Report\"   Electronically signed by Tamika Mena MD on 8/12/2021 at 10:05 AM

## 2021-08-12 NOTE — ANESTHESIA POSTPROCEDURE EVALUATION
Department of Anesthesiology  Postprocedure Note    Patient: Sixto Bergman  MRN: [id number]  YOB: 1945  Date of evaluation: 8/12/2021  Time:  10:08 AM     Procedure Summary     Date: 08/12/21 Room / Location: [address]    Anesthesia Start: 4773 Anesthesia Stop: 1008    Procedure: COLONOSCOPY WITH BIOPSY (N/A ) Diagnosis: (PERONAL H/O COLON POLYPS)    Surgeons: Kelechi Bingham MD Responsible Provider: Liane Osorio MD    Anesthesia Type: MAC, general ASA Status: 3          Anesthesia Type: MAC, general    Keeley Phase I:      Keeley Phase II:      Last vitals: Reviewed and per EMR flowsheets.        Anesthesia Post Evaluation    Patient location during evaluation: bedside  Patient participation: complete - patient participated  Level of consciousness: awake and alert  Pain score: 0  Airway patency: patent  Nausea & Vomiting: no nausea and no vomiting  Complications: no  Cardiovascular status: blood pressure returned to baseline and hemodynamically stable  Respiratory status: acceptable, room air and spontaneous ventilation  Hydration status: euvolemic

## 2021-08-12 NOTE — PROGRESS NOTES
REPORT RECEIVED FROM TOAN BATISTA IN SDS. PREOP QUESTIONS, NPO STATUS AND ALLERGIES VERIFIED WITH PT. H/P AND CONSENT COMPLETED. DENIES TAKING BLOOD THINNERS. STATES SHE TOOK ATENOLOL AT 0300. Tanner DAN 49., AT BEDSIDE.

## 2021-08-12 NOTE — PROGRESS NOTES
REPORT GIVEN TO FELTON BATISTA IN SDS. PT AWAKE AND RESPONSIVE. VS STABLE. DENIES C/O OR NEEDS AT THIS TIME. SY AT BEDSIDE.

## 2021-08-12 NOTE — PROGRESS NOTES
1015- Patient returned to [facility] report given to this nurse from [address] [place] [address], patient is A&O able to make needs known no c/o pain friend at bedside. 1020- Patient sitting p at side of bed drinking water. 1030- IV removed. Patient sitting on side of bed getting dressed friend at bedside. 1050- Discharge instructions given to patient and her friend both verbalized understanding. [phone] Patient escorted to car via W/C where friend will transport home.

## 2021-08-23 ENCOUNTER — HOSPITAL ENCOUNTER (OUTPATIENT)
Dept: INFUSION THERAPY | Age: 76
Discharge: HOME | End: 2021-08-23
Payer: MEDICARE

## 2021-08-23 DIAGNOSIS — C50.911 CHEST WALL RECURRENCE OF BREAST CANCER, RIGHT (HCC): ICD-10-CM

## 2021-08-23 DIAGNOSIS — C50.411 MALIGNANT NEOPLASM OF UPPER-OUTER QUADRANT OF RIGHT BREAST IN FEMALE, ESTROGEN RECEPTOR POSITIVE (HCC): ICD-10-CM

## 2021-08-23 DIAGNOSIS — Z17.0 MALIGNANT NEOPLASM OF UPPER-OUTER QUADRANT OF RIGHT BREAST IN FEMALE, ESTROGEN RECEPTOR POSITIVE (HCC): ICD-10-CM

## 2021-08-23 DIAGNOSIS — C79.89 CHEST WALL RECURRENCE OF BREAST CANCER, RIGHT (HCC): ICD-10-CM

## 2021-08-23 LAB
ALBUMIN SERPL-MCNC: 4.4 GM/DL (ref 3.4–5)
ALP BLD-CCNC: 74 IU/L (ref 40–128)
ALT SERPL-CCNC: 23 U/L (ref 10–40)
ANION GAP SERPL CALCULATED.3IONS-SCNC: 9 MMOL/L (ref 4–16)
AST SERPL-CCNC: 15 IU/L (ref 15–37)
BASOPHILS ABSOLUTE: 0 K/CU MM
BASOPHILS RELATIVE PERCENT: 0.4 % (ref 0–1)
BILIRUB SERPL-MCNC: 0.6 MG/DL (ref 0–1)
BUN BLDV-MCNC: 16 MG/DL (ref 6–23)
CALCIUM SERPL-MCNC: 10 MG/DL (ref 8.3–10.6)
CHLORIDE BLD-SCNC: 99 MMOL/L (ref 99–110)
CO2: 31 MMOL/L (ref 21–32)
CREAT SERPL-MCNC: 0.7 MG/DL (ref 0.6–1.1)
DIFFERENTIAL TYPE: ABNORMAL
EOSINOPHILS ABSOLUTE: 0.1 K/CU MM
EOSINOPHILS RELATIVE PERCENT: 2.5 % (ref 0–3)
GFR AFRICAN AMERICAN: >60 ML/MIN/1.73M2
GFR NON-AFRICAN AMERICAN: >60 ML/MIN/1.73M2
GLUCOSE BLD-MCNC: 98 MG/DL (ref 70–99)
HCT VFR BLD CALC: 41.6 % (ref 37–47)
HEMOGLOBIN: 14.2 GM/DL (ref 12.5–16)
LYMPHOCYTES ABSOLUTE: 1.3 K/CU MM
LYMPHOCYTES RELATIVE PERCENT: 27.8 % (ref 24–44)
MCH RBC QN AUTO: 34.9 PG (ref 27–31)
MCHC RBC AUTO-ENTMCNC: 34.1 % (ref 32–36)
MCV RBC AUTO: 102.2 FL (ref 78–100)
MONOCYTES ABSOLUTE: 0.6 K/CU MM
MONOCYTES RELATIVE PERCENT: 11.5 % (ref 0–4)
PDW BLD-RTO: 12.3 % (ref 11.7–14.9)
PLATELET # BLD: 214 K/CU MM (ref 140–440)
PMV BLD AUTO: 9.7 FL (ref 7.5–11.1)
POTASSIUM SERPL-SCNC: 4 MMOL/L (ref 3.5–5.1)
RBC # BLD: 4.07 M/CU MM (ref 4.2–5.4)
SEGMENTED NEUTROPHILS ABSOLUTE COUNT: 2.8 K/CU MM
SEGMENTED NEUTROPHILS RELATIVE PERCENT: 57.8 % (ref 36–66)
SODIUM BLD-SCNC: 139 MMOL/L (ref 135–145)
TOTAL PROTEIN: 6.7 GM/DL (ref 6.4–8.2)
WBC # BLD: 4.8 K/CU MM (ref 4–10.5)

## 2021-08-23 PROCEDURE — 85025 COMPLETE CBC W/AUTO DIFF WBC: CPT

## 2021-08-23 PROCEDURE — 36415 COLL VENOUS BLD VENIPUNCTURE: CPT

## 2021-08-23 PROCEDURE — 80053 COMPREHEN METABOLIC PANEL: CPT

## 2021-08-23 PROCEDURE — 86300 IMMUNOASSAY TUMOR CA 15-3: CPT

## 2021-08-25 LAB — CA 27.29: 14.3 U/ML

## 2021-08-27 ENCOUNTER — OFFICE VISIT (OUTPATIENT)
Dept: ONCOLOGY | Age: 76
End: 2021-08-27
Payer: MEDICARE

## 2021-08-27 ENCOUNTER — HOSPITAL ENCOUNTER (OUTPATIENT)
Dept: INFUSION THERAPY | Age: 76
Discharge: HOME | End: 2021-08-27
Payer: MEDICARE

## 2021-08-27 VITALS
HEIGHT: 65 IN | RESPIRATION RATE: 18 BRPM | HEART RATE: 73 BPM | DIASTOLIC BLOOD PRESSURE: 66 MMHG | BODY MASS INDEX: 33.82 KG/M2 | OXYGEN SATURATION: 96 % | SYSTOLIC BLOOD PRESSURE: 127 MMHG | TEMPERATURE: 98.1 F | WEIGHT: 203 LBS

## 2021-08-27 DIAGNOSIS — C50.911 CHEST WALL RECURRENCE OF BREAST CANCER, RIGHT (HCC): ICD-10-CM

## 2021-08-27 DIAGNOSIS — C79.89 CHEST WALL RECURRENCE OF BREAST CANCER, RIGHT (HCC): ICD-10-CM

## 2021-08-27 DIAGNOSIS — R42 DIZZINESS: ICD-10-CM

## 2021-08-27 DIAGNOSIS — C50.411 MALIGNANT NEOPLASM OF UPPER-OUTER QUADRANT OF RIGHT BREAST IN FEMALE, ESTROGEN RECEPTOR POSITIVE (HCC): Primary | ICD-10-CM

## 2021-08-27 DIAGNOSIS — Z17.0 MALIGNANT NEOPLASM OF UPPER-OUTER QUADRANT OF RIGHT BREAST IN FEMALE, ESTROGEN RECEPTOR POSITIVE (HCC): Primary | ICD-10-CM

## 2021-08-27 PROCEDURE — 99214 OFFICE O/P EST MOD 30 MIN: CPT | Performed by: INTERNAL MEDICINE

## 2021-08-27 PROCEDURE — 99211 OFF/OP EST MAY X REQ PHY/QHP: CPT

## 2021-08-27 RX ORDER — ANASTROZOLE 1 MG/1
1 TABLET ORAL DAILY
Qty: 90 TABLET | Refills: 3 | Status: SHIPPED | OUTPATIENT
Start: 2021-08-27 | End: 2022-08-23

## 2021-08-27 NOTE — PROGRESS NOTES
Nilson Jesus Name: Leslie Shell  Patient : 1945  Patient MRN: [id number]     Primary Oncologist: Kate Rodriguez MD  PCP:Dr Rut Dupree  Surgeon Dr Obdulio Romero     Date of Service: 21    Chief Complaint:   Chief Complaint   Patient presents with    Follow-up        Active Problem list   Breast cancer    HPI  1. Mrs. Florence Wesley ( 45) has a history of right breast cancer, for which she had modified radical mastectomy in . She had a couple of local recurrences, treated with chemotherapy and radiation therapy and excision of the chest wall recurrences. 2. In  she had a total abdominal hysterectomy/bilateral salpingo-oophorectomy, which was done as part of hormonal therapy and Tamoxifen for one year. After that she did not show any evidence of recurrence. 3. In  she presented with right axillary mass which was biopsied and confirmed to be an invasive carcinoma, morphologically consistent with origin from her breast primary. Her ER was positive 90 percent, SD positive. HER-2/naomi was negative by FISH. 4. All other w/u including CT of the chest, abdomen and pelvis showed no evidence of any intraabdominal mass or metastases. Bone scan was negative for any bony mets. Her MUGA scan showed 61 percent ejection fraction. 5. She was started on Taxol d1&8 [id number] for 6 courses starting in Dec of 2012 till 2013 and showed good partial response with mass size reduced to 2.4 cm in  from 3 cm in 2012 and completed 6 courses in 2013. Right Ax mass completely resolved over next couple years. 6. In May 2013 she was initiated on Hormonal therapy with Arimidex 1 mg daily for maintenance treatment. 7. In continued Clinical remission. 2017 Hemoglobin 14.3, hematocrit 42, white count 5,900, platelet 267,470, BUN 17, creatinine 0.7. Sodium was 141, potassium 3.4, chloride 95, calcium 10.0. The rest of the chemistries are normal. LFTs normal, , uric acid 6.0. CEA 1.6, CA 27 14. aggregates and a tubular adenoma from the ascending colon. The rest was negative. (Done by Dr. Fabiana Herrera.) ,  6. CAT scan of the chest, which showed a 3 cm anterior chest wall nodule unchanged from 2011, also present in 2009, when it was 2.5 cm.,  7. Chest x-ray: 2015 No acute cardiopulmonary findings. ,  8. Mammogram of the left breast was negative for any lesions. PAST SURGICAL HISTORY:   1. History of breast cancer as listed above. 2. Squamous cell cancer removed from the leg in . 3. Cholecystectomy in . 4. Heel spur surgery .,  5. Right Total knee replacement in 2011.  6. Lesion removed from her left arm in . 7. Joint replacement in her thumb in May of 2012. 8. Cystic lesions removed by Dr Ana Reese from her finger Sep 2014. 9. ear surgery 2015     FAMILY HISTORY:   Both father and mother had coronary artery disease. There is also a history of cancer of the colon and liver in the family. Sister had hepatitis C.     SOCIAL HISTORY:   She has never smoked. She is a nondrinker. She is  since . Her   of a heart attack. 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 Interval History   21: Alone to the clinic today. Reported that she had colonoscopy , has been diagnosed with the diverticulosis, polyp, hemorrhoids. Reported that she has been having diarrhea since the prep for colonoscopy. Reported that she takes apple cider Gummies for constipation. Has been feeling dizzy today. No fever or any cough. Denied any chest pain, increased shortness of breath, palpitations. Reported that she is compliant with anastrozole and is tolerating fairly well. Denied any abdominal pain, nausea, vomiting,constipation. Denied any  symptoms. LE edema bilaterally and has been using compressor which helps a lot. No new left breast mass. Reported that her port has been removed.   Reported dry cough secondary to sinus and allergies. No fever. No overt bleeding. Has lost 2 pounds. Appetite is okay. She was worried about 2 skin lesions 1 on the forehead and 1 on the face, under the lower lip    Review of Systems  Per interval history; otherwise 10 point ROS is negative      Vital Signs:  /66 (Site: Left Upper Arm, Position: Sitting, Cuff Size: Large Adult)   Pulse 73   Temp 98.1 °F (36.7 °C) (Temporal)   Resp 18   Ht 5' 5\" (1.651 m)   Wt 203 lb (92.1 kg)   SpO2 96%   BMI 33.78 kg/m²     Physical Exam:      CONSTITUTIONAL: awake, alert, obese, ambulates with a walker   EYES: mahsa, no palor or any icetrus   ENT: ATNC   NECK: no JVD   HEMATOLOGIC/LYMPHATIC: no cervical, supraclavicular or axillary lymphadenopathy   LUNGS: ctab  Breast; Rt mastectomy. Right axillary scar very well healed, no pain, no discharge.   Left breast normal  CARDIOVASCULAR: s1s2 rrr no murmurs   ABDOMEN: soft ntnd bs pos  NEUROLOGIC:GI   SKIN: No rash   EXTREMITIES:Bilateral LE edema 2-3+      Labs:    Hematology:  Lab Results   Component Value Date    WBC 4.8 08/23/2021    RBC 4.07 (L) 08/23/2021    HGB 14.2 08/23/2021    HCT 41.6 08/23/2021    .2 (H) 08/23/2021    MCH 34.9 (H) 08/23/2021    MCHC 34.1 08/23/2021    RDW 12.3 08/23/2021     08/23/2021    MPV 9.7 08/23/2021    BANDSPCT 2 (L) 11/21/2017    SEGSPCT 57.8 08/23/2021    EOSRELPCT 2.5 08/23/2021    BASOPCT 0.4 08/23/2021    LYMPHOPCT 27.8 08/23/2021    MONOPCT 11.5 (H) 08/23/2021    BANDABS 0.10 11/21/2017    SEGSABS 2.8 08/23/2021    EOSABS 0.1 08/23/2021    BASOSABS 0.0 08/23/2021    LYMPHSABS 1.3 08/23/2021    MONOSABS 0.6 08/23/2021    DIFFTYPE AUTOMATED DIFFERENTIAL 08/23/2021    POLYCHROM 1+ 12/12/2016    PLTM FEW 12/12/2016     Lab Results   Component Value Date    ESR 12 03/08/2017       Chemistry:  Lab Results   Component Value Date     08/23/2021    K 4.0 08/23/2021    CL 99 08/23/2021    CO2 31 08/23/2021    BUN 16 08/23/2021 CREATININE 0.7 08/23/2021    GLUCOSE 98 08/23/2021    CALCIUM 10.0 08/23/2021    PROT 6.7 08/23/2021    LABALBU 4.4 08/23/2021    BILITOT 0.6 08/23/2021    ALKPHOS 74 08/23/2021    AST 15 08/23/2021    ALT 23 08/23/2021    LABGLOM >60 08/23/2021    GFRAA >60 08/23/2021    PHOS 1.9 (L) 09/09/2019    MG 2.0 12/14/2020    POCGLU 90 12/14/2020     Lab Results   Component Value Date     10/17/2017     No components found for: LD  Lab Results   Component Value Date    TSHHS 2.780 12/15/2020    T4FREE 1.32 12/15/2020    FT3 3.2 04/27/2017       Immunology:  Lab Results   Component Value Date    PROT 6.7 08/23/2021     No results found for: Eduardo Imperial, KLFLCR  No results found for: B2M    Coagulation Panel:  Lab Results   Component Value Date    PROTIME 10.9 (L) 12/14/2020    INR 0.90 12/14/2020    APTT 23.8 04/27/2017    DDIMER 919 (H) 12/12/2016       Anemia Panel:  Lab Results   Component Value Date    TFHWWXDD77 579.2 12/15/2020    FOLATE 19.1 (H) 12/15/2020       Tumor Markers:  Lab Results   Component Value Date    CEA 2.1 01/22/2019    LABCA2 14.3 08/23/2021         Imaging: Reviewed     Pathology:Reviewed     Observations:  Performance Status: ECOG 2   Depression Status: PHQ 9 neg       Assessment & Plan:  1. Ca Breast 1972 with Right Ax Recurrence Nov 2012   Initial diagnosis in 1972 when she had right modified radical mastectomy. She had isolated local recurrences in the early eighties, for which she had excision, followed by radiation therapy and hormonal therapy. After being off treatment for many years, she had another recurrence in November of 2012 and after excision, she had minimal residual disease and has been on maintenance Arimidex for five years after initial chemotherapy with a taxane. Dr Melecio Wood recommendation was to keep her on hormonal therapy for at least 10 years or even longer. Has been off of AI intermittently for bone pain, last time since feb 2020.   Resumed Luma 15    PET with avid right axillary mass, biopsy-proven invasive ductal carcinoma KY ER positive KY negative and HER-2 negative. No other metastatic disease. S/P surgical resection  No recs for any advunat XRT  Plan to Continue AI and monitor for AE. Dexa scan 2017 with osteopenia. Recommend continuation of calcium and vitamin D. Repeat dexa scan 2020 with osteopenia in the hips. Mammogram left 2021 normal, recommend repeat in 1 year  2021, CA 27-29, CBC and CMP within normal limits    LE edema:Compressor helping . Is on lasix, Compression stockings and also recommend feet elevation. Stay asthma chest tube as possible    Port removed    cholesteotoma? ??: follow with [facility]    Diarrhea most probably secondary to the prep. Recommend discontinuation of the apple cider Gummies for now. Macrocytosis without anemia: Continue to monitor    Continue other medical care    Discussed the above findings and plan with her and she verbalized understanding    Discussed Lifestyle, healthy diet and regular exercise as tolerated. Also discussed importance of being up-to-date with age-appropriate screening tools, Left mammogram due again 2021. Recommend follow-up with primary care physician and other specialists. Please do not hesitate to contact us if you need ay further information. Return to clinic in  3months  or earlier if new symptoms. I have recommended that the patient follow CDC guidelines for prevention of COVID-19 infection. Johnathon 23 Name: Ivory Tyson  Patient : 1945  Patient MRN: [id number]     Primary Oncologist: Mary Evans MD  PCP:Dr Theresa Tse  Surgeon Dr Lissette Lui     Date of Service: 21    Chief Complaint:   Chief Complaint   Patient presents with    Follow-up        Active Problem list   Breast cancer    HPI  8. Mrs. Kori Miles ( 45) has a history of right breast cancer, for which she had modified radical mastectomy in .  She had a couple of local recurrences, treated with chemotherapy and radiation therapy and excision of the chest wall recurrences. 9. In 1983 she had a total abdominal hysterectomy/bilateral salpingo-oophorectomy, which was done as part of hormonal therapy and Tamoxifen for one year. After that she did not show any evidence of recurrence. 10. In Nov/2012 she presented with right axillary mass which was biopsied and confirmed to be an invasive carcinoma, morphologically consistent with origin from her breast primary. Her ER was positive 90 percent, IA positive. HER-2/naomi was negative by FISH. 11. All other w/u including CT of the chest, abdomen and pelvis showed no evidence of any intraabdominal mass or metastases. Bone scan was negative for any bony mets. Her MUGA scan showed 61 percent ejection fraction. 12. She was started on Taxol d1&8 [id number] for 6 courses starting in Dec of 2012 till April 2013 and showed good partial response with mass size reduced to 2.4 cm in 4/13 from 3 cm in 11/2012 and completed 6 courses in April of 2013. Right Ax mass completely resolved over next couple years. 15. In May 2013 she was initiated on Hormonal therapy with Arimidex 1 mg daily for maintenance treatment. 14. In continued Clinical remission. April 2017 Hemoglobin 14.3, hematocrit 42, white count 5,900, platelet 518,995, BUN 17, creatinine 0.7. Sodium was 141, potassium 3.4, chloride 95, calcium 10.0. The rest of the chemistries are normal. LFTs normal, , uric acid 6.0. CEA 1.6, CA 27 14.     11-17 DEXA scan showed osteopenia, CT chest abdomen pelvis showed no evidence of metastatic disease, fatty liver, right renal nonobstructing parapelvic cyst, diverticulosis. Nuclear med bone scan showed no evidence to suggest osseous metastatic disease.   June 2018 Uri Martin started but never got more than 1  July 2018 Left breast with no mammographic evidence of malignancy  july 2019 Uri Martin restarted but got only one dose again  9-2019 stopped anastrazole for bone pain/arthritic pains, which got better, then placed on letrozole but made her feel bad so she stopped it. 10-19 restarted anastrazole   Feb 2020 stopped AI  Feb 2020 Mammogram normal BIRADS cat 2  June 2020 restarted taking anastrazole. 7/9/2020 limited right breast axillary ultrasound revealed highly suspicious right breast 9:00 mass correlating to the palpable complaint. 7/14/2020 right breast area mass biopsy revealed invasive ductal carcinoma grade 2. ER greater than 95% positive. IN negative. HER-2 by IHC negative HER-2 by FISH pending. 7/16/2026 PET scan revealed metabolically active nodule along the inferior lateral margin of the right mastectomy site concerning for recurrent disease. This has slowly enlarged since 11/21/2017. No other metabolically active disease. 8/7/20: underwent excision with skin flap. Tumor size was 3cm, Grade 2, DCIS/LCIS not present. ER positive >95%, IN negative and her 2 neg by IHC and FISH. Margins <1mm     No recs for adjuvant XRT per Dr Rolo Barone. Resumed AI September 2020    PAST MEDICAL HISTORY:   1. Hypertension since 1980s.,  2. Asthma since 2008.,  3. Allergies to mold, animals, dust, et Sharla Lunch, seem to bother her ,  4. Arthritis since early 2000 involving her knee and neck. ,  5. Colonoscopy done in 2011 with biopsy of transverse colon showed benign mucosal lymphoid aggregates and a tubular adenoma from the ascending colon. The rest was negative. (Done by Dr. Angela Suh.) ,  6. CAT scan of the chest, which showed a 3 cm anterior chest wall nodule unchanged from July 2011, also present in February of 2009, when it was 2.5 cm.,  7. Chest x-ray: Nov 2015 No acute cardiopulmonary findings. ,  8. Mammogram of the left breast was negative for any lesions. PAST SURGICAL HISTORY:   1. History of breast cancer as listed above. 2. Squamous cell cancer removed from the leg in 2004. 3. Cholecystectomy in 2004.    4. Heel spur surgery .,  5. Right Total knee replacement in 2011.  6. Lesion removed from her left arm in . 7. Joint replacement in her thumb in May of 2012. 8. Cystic lesions removed by Dr Theresa Marquis from her finger Sep 2014. 9. ear surgery 2015     FAMILY HISTORY:   Both father and mother had coronary artery disease. There is also a history of cancer of the colon and liver in the family. Sister had hepatitis C.     SOCIAL HISTORY:   She has never smoked. She is a nondrinker. She is  since . Her   of a heart attack. 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 Interval History   21: Alone to the clinic today. Reported that she feels dizzy when she turns her neck. Undergoing physical therapy for that. No fever or any cough. Denied any chest pain, increased shortness of breath, palpitations. Reported that she is compliant with anastrozole and is tolerating fairly well. Denied any abdominal pain, nausea, vomiting,constipation. Denied any  symptoms. LE edema bilaterally and has been using compressor which helps a lot. No new masses. Review of Systems  Per interval history; otherwise 10 point ROS is negative      Vital Signs:  /66 (Site: Left Upper Arm, Position: Sitting, Cuff Size: Large Adult)   Pulse 73   Temp 98.1 °F (36.7 °C) (Temporal)   Resp 18   Ht 5' 5\" (1.651 m)   Wt 203 lb (92.1 kg)   SpO2 96%   BMI 33.78 kg/m²     Physical Exam:      CONSTITUTIONAL: awake, alert, obese, ambulates with a walker   EYES: mahsa, no palor or any icetrus   ENT: ATNC   NECK: no JVD   HEMATOLOGIC/LYMPHATIC: no cervical, supraclavicular or axillary lymphadenopathy   LUNGS: ctab  Breast; Rt mastectomy.  Right axillary scar very well healed, no pain, no discharge  CARDIOVASCULAR: s1s2 rrr no murmurs   ABDOMEN: soft ntnd bs pos  NEUROLOGIC:GI   SKIN: No rash   EXTREMITIES:Bilateral LE edema 2-3+      Labs:    Hematology:  Lab Results   Component Value Date    WBC 4.8 08/23/2021    RBC 4.07 (L) 08/23/2021    HGB 14.2 08/23/2021    HCT 41.6 08/23/2021    .2 (H) 08/23/2021    MCH 34.9 (H) 08/23/2021    MCHC 34.1 08/23/2021    RDW 12.3 08/23/2021     08/23/2021    MPV 9.7 08/23/2021    BANDSPCT 2 (L) 11/21/2017    SEGSPCT 57.8 08/23/2021    EOSRELPCT 2.5 08/23/2021    BASOPCT 0.4 08/23/2021    LYMPHOPCT 27.8 08/23/2021    MONOPCT 11.5 (H) 08/23/2021    BANDABS 0.10 11/21/2017    SEGSABS 2.8 08/23/2021    EOSABS 0.1 08/23/2021    BASOSABS 0.0 08/23/2021    LYMPHSABS 1.3 08/23/2021    MONOSABS 0.6 08/23/2021    DIFFTYPE AUTOMATED DIFFERENTIAL 08/23/2021    POLYCHROM 1+ 12/12/2016    PLTM FEW 12/12/2016     Lab Results   Component Value Date    ESR 12 03/08/2017       Chemistry:  Lab Results   Component Value Date     08/23/2021    K 4.0 08/23/2021    CL 99 08/23/2021    CO2 31 08/23/2021    BUN 16 08/23/2021    CREATININE 0.7 08/23/2021    GLUCOSE 98 08/23/2021    CALCIUM 10.0 08/23/2021    PROT 6.7 08/23/2021    LABALBU 4.4 08/23/2021    BILITOT 0.6 08/23/2021    ALKPHOS 74 08/23/2021    AST 15 08/23/2021    ALT 23 08/23/2021    LABGLOM >60 08/23/2021    GFRAA >60 08/23/2021    PHOS 1.9 (L) 09/09/2019    MG 2.0 12/14/2020    POCGLU 90 12/14/2020     Lab Results   Component Value Date     10/17/2017     No components found for: LD  Lab Results   Component Value Date    TSHHS 2.780 12/15/2020    T4FREE 1.32 12/15/2020    FT3 3.2 04/27/2017       Immunology:  Lab Results   Component Value Date    PROT 6.7 08/23/2021     No results found for: SHAY Carrera  No results found for: B2M    Coagulation Panel:  Lab Results   Component Value Date    PROTIME 10.9 (L) 12/14/2020    INR 0.90 12/14/2020    APTT 23.8 04/27/2017    DDIMER 919 (H) 12/12/2016       Anemia Panel:  Lab Results   Component Value Date    [id number] 579.2 12/15/2020    FOLATE 19.1 (H) 12/15/2020       Tumor Markers:  Lab Results   Component Value Date    CEA 2.1 01/22/2019    LABCA2 14.3 08/23/2021         Imaging: Reviewed     Pathology:Reviewed     Observations:  Performance Status: ECOG 2   Depression Status: PHQ 9 neg       Assessment & Plan:  1. Ca Breast 1972 with Right Ax Recurrence Nov 2012   Initial diagnosis in 1972 when she had right modified radical mastectomy. She had isolated local recurrences in the early eighties, for which she had excision, followed by radiation therapy and hormonal therapy. After being off treatment for many years, she had another recurrence in November of 2012 and after excision, she had minimal residual disease and has been on maintenance Arimidex for five years after initial chemotherapy with a taxane. Dr Cassidy Tello recommendation was to keep her on hormonal therapy for at least 10 years or even longer. Has been off of AI intermittently for bone pain, last time since feb 2020. Resumed Luma 15 2020   PET with avid right axillary mass, biopsy-proven invasive ductal carcinoma KY ER positive KY negative and HER-2 negative. No other metastatic disease. S/P surgical resection  No recs for any advunat XRT  Plan to Continue AI and monitor for AE. Ca 27-29 feb 2021 12, CBC and CMP normal  Dexa scan 11/2017 with osteopenia. Recommend continuation of calcium and vitamin D. Repeat dexa scan October 2020 with osteopenia in the hips. Mammogram left ordered    LE edema:Compressor helping . Is on lasix, Compression stockings and also recommend feet elevation    Requesting removal, consult placed for Dr. Lissy Edward. cholesteotoma? ??: follow with [facility]    Mild hypokalemia:  Continue K supplements OD as she is on lasix    Macrocytosis without anemia: Continue to monitor    Skin lesions appears benign, continue to monitor    Continue other medical care    Discussed the above findings and plan with her and she verbalized understanding    Discussed Lifestyle, healthy diet and regular exercise as tolerated.  Also discussed importance of being up-to-date with age-appropriate screening tools, Left mammogram due again feb 2021. Recommend follow-up with primary care physician and other specialists. Please do not hesitate to contact us if you need ay further information. Return to clinic December 2021 or earlier if new symptoms. I have recommended that the patient follow CDC guidelines for prevention of COVID-19 infection.   She has received Covid vaccine      [place]

## 2021-08-27 NOTE — PROGRESS NOTES
MA Rooming Questions  Patient: Ana Davenport  MRN: [id number]    Date: 8/27/2021        1. Do you have any new issues? yes - has some places on skin she wants  to take a look at. 2. Do you need any refills on medications? yes - Anastrozole    3. Have you had any imaging done since your last visit? yes - Mammo, Colonoscopy    4. Have you been hospitalized or seen in the emergency room since your last visit here?   no    5. Did the patient have a depression screening completed today?  No    No data recorded     PHQ-9 Given to (if applicable):               PHQ-9 Score (if applicable):                     [] Positive     []  Negative              Does question #9 need addressed (if applicable)                     [] Yes    []  No               Aracelis Maldonado CMA

## 2021-10-14 ENCOUNTER — OFFICE VISIT (OUTPATIENT)
Dept: ONCOLOGY | Age: 76
End: 2021-10-14
Payer: MEDICARE

## 2021-10-14 ENCOUNTER — HOSPITAL ENCOUNTER (OUTPATIENT)
Dept: INFUSION THERAPY | Age: 76
Discharge: HOME | End: 2021-10-14
Payer: MEDICARE

## 2021-10-14 VITALS
HEIGHT: 65 IN | WEIGHT: 205 LBS | OXYGEN SATURATION: 97 % | RESPIRATION RATE: 18 BRPM | DIASTOLIC BLOOD PRESSURE: 94 MMHG | SYSTOLIC BLOOD PRESSURE: 183 MMHG | BODY MASS INDEX: 34.16 KG/M2 | HEART RATE: 88 BPM | TEMPERATURE: 96.8 F

## 2021-10-14 DIAGNOSIS — K14.8 TONGUE LESION: ICD-10-CM

## 2021-10-14 DIAGNOSIS — Z17.0 MALIGNANT NEOPLASM OF UPPER-OUTER QUADRANT OF RIGHT BREAST IN FEMALE, ESTROGEN RECEPTOR POSITIVE (HCC): Primary | ICD-10-CM

## 2021-10-14 DIAGNOSIS — H92.02 LEFT EAR PAIN: ICD-10-CM

## 2021-10-14 DIAGNOSIS — C50.411 MALIGNANT NEOPLASM OF UPPER-OUTER QUADRANT OF RIGHT BREAST IN FEMALE, ESTROGEN RECEPTOR POSITIVE (HCC): Primary | ICD-10-CM

## 2021-10-14 DIAGNOSIS — C50.911 CHEST WALL RECURRENCE OF BREAST CANCER, RIGHT (HCC): ICD-10-CM

## 2021-10-14 DIAGNOSIS — C79.89 CHEST WALL RECURRENCE OF BREAST CANCER, RIGHT (HCC): ICD-10-CM

## 2021-10-14 PROCEDURE — 99211 OFF/OP EST MAY X REQ PHY/QHP: CPT

## 2021-10-14 PROCEDURE — 99214 OFFICE O/P EST MOD 30 MIN: CPT | Performed by: INTERNAL MEDICINE

## 2021-10-14 RX ORDER — CIPROFLOXACIN AND DEXAMETHASONE 3; 1 MG/ML; MG/ML
4 SUSPENSION/ DROPS AURICULAR (OTIC) 2 TIMES DAILY
Qty: 1 EACH | Refills: 0 | Status: SHIPPED | OUTPATIENT
Start: 2021-10-14 | End: 2021-10-21

## 2021-10-14 NOTE — PROGRESS NOTES
Nilson Jesus Name: Emma Rivers  Patient : 1945  Patient MRN: [id number]     Primary Oncologist: Harjit Browne MD  PCP:Dr Dilia Manriquez  Surgeon Dr Haylee Kidd     Date of Service: 10/14/21    Chief Complaint:   Chief Complaint   Patient presents with    Follow-up        Active Problem list   Breast cancer    HPI  1. Mrs. Gely Potter ( 45) has a history of right breast cancer, for which she had modified radical mastectomy in . She had a couple of local recurrences, treated with chemotherapy and radiation therapy and excision of the chest wall recurrences. 2. In  she had a total abdominal hysterectomy/bilateral salpingo-oophorectomy, which was done as part of hormonal therapy and Tamoxifen for one year. After that she did not show any evidence of recurrence. 3. In  she presented with right axillary mass which was biopsied and confirmed to be an invasive carcinoma, morphologically consistent with origin from her breast primary. Her ER was positive 90 percent, [place] positive. HER-2/naomi was negative by FISH. 4. All other w/u including CT of the chest, abdomen and pelvis showed no evidence of any intraabdominal mass or metastases. Bone scan was negative for any bony mets. Her MUGA scan showed 61 percent ejection fraction. 5. She was started on Taxol d1&8 [id number] for 6 courses starting in Dec of 2012 till 2013 and showed good partial response with mass size reduced to 2.4 cm in  from 3 cm in 2012 and completed 6 courses in 2013. Right Ax mass completely resolved over next couple years. 6. In May 2013 she was initiated on Hormonal therapy with Arimidex 1 mg daily for maintenance treatment. 7. In continued Clinical remission. 2017 Hemoglobin 14.3, hematocrit 42, white count 5,900, platelet 557,533, BUN 17, creatinine 0.7. Sodium was 141, potassium 3.4, chloride 95, calcium 10.0. The rest of the chemistries are normal. LFTs normal, , uric acid 6.0. CEA 1.6, CA 27 14. 11-17 DEXA scan showed osteopenia, CT chest abdomen pelvis showed no evidence of metastatic disease, fatty liver, right renal nonobstructing parapelvic cyst, diverticulosis. Nuclear med bone scan showed no evidence to suggest osseous metastatic disease. June 2018 [facility] started but never got more than 1  July 2018 Left breast with no mammographic evidence of malignancy  july 2019 [facility] restarted but got only one dose again  9-2019 stopped anastrazole for bone pain/arthritic pains, which got better, then placed on letrozole but made her feel bad so she stopped it. 10-19 restarted anastrazole   Feb 2020 stopped AI  Feb 2020 Mammogram normal BIRADS cat 2  June 2020 restarted taking anastrazole. 7/9/2020 limited right breast axillary ultrasound revealed highly suspicious right breast 9:00 mass correlating to the palpable complaint. 7/14/2020 right breast area mass biopsy revealed invasive ductal carcinoma grade 2. ER greater than 95% positive. [place] negative. HER-2 by IHC negative HER-2 by FISH pending. 7/16/2026 PET scan revealed metabolically active nodule along the inferior lateral margin of the right mastectomy site concerning for recurrent disease. This has slowly enlarged since 11/21/2017. No other metabolically active disease. 8/7/20: underwent excision with skin flap. Tumor size was 3cm, Grade 2, DCIS/LCIS not present. ER positive >95%, [place] negative and her 2 neg by IHC and FISH. Margins <1mm     No recs for adjuvant XRT per Dr Dominik Oconnor. Resumed AI September 2020 7/22/2021 left screening mammogram.  No mammographic evidence of malignancy. Status post removal of the left chest wall port. PAST MEDICAL HISTORY:   1. Hypertension since 1980s.,  2. Asthma since 2008.,  3. Allergies to mold, animals, dust, et [place] , seem to bother her ,  4. Arthritis since early 2000 involving her knee and neck. ,  5.  Colonoscopy done in 2011 with biopsy of transverse colon showed benign mucosal lymphoid secondary to sinus and allergies. No fever. No overt bleeding. Has lost 2 pounds. Appetite is okay. She was worried about 2 skin lesions 1 on the forehead and 1 on the face, under the lower lip    Review of Systems  Per interval history; otherwise 10 point ROS is negative      Vital Signs:  BP (!) 183/94 (Site: Left Lower Arm, Position: Sitting, Cuff Size: Large Adult)   Pulse 88   Temp 96.8 °F (36 °C) (Temporal)   Resp 18   Ht 5' 5\" (1.651 m)   Wt 205 lb (93 kg)   SpO2 97%   BMI 34.11 kg/m²     Physical Exam:      CONSTITUTIONAL: awake, alert, obese, ambulates with a walker   EYES: mahsa, no palor or any icetrus   ENT: ATNC   NECK: no JVD   HEMATOLOGIC/LYMPHATIC: no cervical, supraclavicular or axillary lymphadenopathy   LUNGS: ctab  Breast; Rt mastectomy. Right axillary scar very well healed, no pain, no discharge.   Left breast normal  CARDIOVASCULAR: s1s2 rrr no murmurs   ABDOMEN: soft ntnd bs pos  NEUROLOGIC:GI   SKIN: No rash   EXTREMITIES:Bilateral LE edema 2-3+      Labs:    Hematology:  Lab Results   Component Value Date    WBC 4.8 08/23/2021    RBC 4.07 (L) 08/23/2021    HGB 14.2 08/23/2021    HCT 41.6 08/23/2021    .2 (H) 08/23/2021    MCH 34.9 (H) 08/23/2021    MCHC 34.1 08/23/2021    RDW 12.3 08/23/2021     08/23/2021    MPV 9.7 08/23/2021    BANDSPCT 2 (L) 11/21/2017    SEGSPCT 57.8 08/23/2021    EOSRELPCT 2.5 08/23/2021    BASOPCT 0.4 08/23/2021    LYMPHOPCT 27.8 08/23/2021    MONOPCT 11.5 (H) 08/23/2021    BANDABS 0.10 11/21/2017    SEGSABS 2.8 08/23/2021    EOSABS 0.1 08/23/2021    BASOSABS 0.0 08/23/2021    LYMPHSABS 1.3 08/23/2021    MONOSABS 0.6 08/23/2021    DIFFTYPE AUTOMATED DIFFERENTIAL 08/23/2021    POLYCHROM 1+ 12/12/2016    PLTM FEW 12/12/2016     Lab Results   Component Value Date    ESR 12 03/08/2017       Chemistry:  Lab Results   Component Value Date     08/23/2021    K 4.0 08/23/2021    CL 99 08/23/2021    CO2 31 08/23/2021    BUN 16 08/23/2021 CREATININE 0.7 08/23/2021    GLUCOSE 98 08/23/2021    CALCIUM 10.0 08/23/2021    PROT 6.7 08/23/2021    LABALBU 4.4 08/23/2021    BILITOT 0.6 08/23/2021    ALKPHOS 74 08/23/2021    AST 15 08/23/2021    ALT 23 08/23/2021    LABGLOM >60 08/23/2021    GFRAA >60 08/23/2021    PHOS 1.9 (L) 09/09/2019    MG 2.0 12/14/2020    POCGLU 90 12/14/2020     Lab Results   Component Value Date     10/17/2017     No components found for: LD  Lab Results   Component Value Date    TSHHS 2.780 12/15/2020    T4FREE 1.32 12/15/2020    FT3 3.2 04/27/2017       Immunology:  Lab Results   Component Value Date    PROT 6.7 08/23/2021     No results found for: Nghia Luevano, KLFLCR  No results found for: B2M    Coagulation Panel:  Lab Results   Component Value Date    PROTIME 10.9 (L) 12/14/2020    INR 0.90 12/14/2020    APTT 23.8 04/27/2017    DDIMER 919 (H) 12/12/2016       Anemia Panel:  Lab Results   Component Value Date    GIQVNGTB04 579.2 12/15/2020    FOLATE 19.1 (H) 12/15/2020       Tumor Markers:  Lab Results   Component Value Date    CEA 2.1 01/22/2019    LABCA2 14.3 08/23/2021         Imaging: Reviewed     Pathology:Reviewed     Observations:  Performance Status: ECOG 2   Depression Status: PHQ 9 neg       Assessment & Plan:  1. Ca Breast 1972 with Right Ax Recurrence Nov 2012   Initial diagnosis in 1972 when she had right modified radical mastectomy. She had isolated local recurrences in the early eighties, for which she had excision, followed by radiation therapy and hormonal therapy. After being off treatment for many years, she had another recurrence in November of 2012 and after excision, she had minimal residual disease and has been on maintenance Arimidex for five years after initial chemotherapy with a taxane. Dr Sulma Houston recommendation was to keep her on hormonal therapy for at least 10 years or even longer. Has been off of AI intermittently for bone pain, last time since feb 2020.   Resumed Luma 15    PET with avid right axillary mass, biopsy-proven invasive ductal carcinoma [place] ER positive [place] negative and HER-2 negative. No other metastatic disease. S/P surgical resection  No recs for any advunat XRT  Plan to Continue AI and monitor for AE. Dexa scan 2017 with osteopenia. Recommend continuation of calcium and vitamin D. Repeat dexa scan 2020 with osteopenia in the hips. Mammogram left 2021 normal, recommend repeat in 1 year  2021, CA 27-29, CBC and CMP within normal limits    LE edema:Compressor helping . Is on lasix, Compression stockings and also recommend feet elevation. Stay asthma chest tube as possible    Port removed    cholesteotoma? ??: follow with [facility]    Diarrhea most probably secondary to the prep. Recommend discontinuation of the apple cider Gummies for now. Macrocytosis without anemia: Continue to monitor    Continue other medical care    Discussed the above findings and plan with her and she verbalized understanding    Discussed Lifestyle, healthy diet and regular exercise as tolerated. Also discussed importance of being up-to-date with age-appropriate screening tools, Left mammogram due again 2021. Recommend follow-up with primary care physician and other specialists. Please do not hesitate to contact us if you need ay further information. Return to clinic in  3months  or earlier if new symptoms. I have recommended that the patient follow CDC guidelines for prevention of COVID-19 infection. Johnathon 23 Name: Nicole Mcduffie  Patient : 1945  Patient MRN: [id number]     Primary Oncologist: Shamar Hogue MD  PCP:Dr Tomas Guzman  Surgeon Dr Ricky Antonio     Date of Service: 10/14/21    Chief Complaint:   Chief Complaint   Patient presents with    Follow-up        Active Problem list   Breast cancer    HPI  8. Mrs. Meli Kelly ( 45) has a history of right breast cancer, for which she had modified radical mastectomy in .  She had a couple of local recurrences, treated with chemotherapy and radiation therapy and excision of the chest wall recurrences. 9. In 1983 she had a total abdominal hysterectomy/bilateral salpingo-oophorectomy, which was done as part of hormonal therapy and Tamoxifen for one year. After that she did not show any evidence of recurrence. 10. In Nov/2012 she presented with right axillary mass which was biopsied and confirmed to be an invasive carcinoma, morphologically consistent with origin from her breast primary. Her ER was positive 90 percent, [place] positive. HER-2/naomi was negative by FISH. 11. All other w/u including CT of the chest, abdomen and pelvis showed no evidence of any intraabdominal mass or metastases. Bone scan was negative for any bony mets. Her MUGA scan showed 61 percent ejection fraction. 12. She was started on Taxol d1&8 [id number] for 6 courses starting in Dec of 2012 till April 2013 and showed good partial response with mass size reduced to 2.4 cm in 4/13 from 3 cm in 11/2012 and completed 6 courses in April of 2013. Right Ax mass completely resolved over next couple years. 15. In May 2013 she was initiated on Hormonal therapy with Arimidex 1 mg daily for maintenance treatment. 14. In continued Clinical remission. April 2017 Hemoglobin 14.3, hematocrit 42, white count 5,900, platelet 583,056, BUN 17, creatinine 0.7. Sodium was 141, potassium 3.4, chloride 95, calcium 10.0. The rest of the chemistries are normal. LFTs normal, , uric acid 6.0. CEA 1.6, CA 27 14.     11-17 DEXA scan showed osteopenia, CT chest abdomen pelvis showed no evidence of metastatic disease, fatty liver, right renal nonobstructing parapelvic cyst, diverticulosis. Nuclear med bone scan showed no evidence to suggest osseous metastatic disease.   June 2018 [facility] started but never got more than 1  July 2018 Left breast with no mammographic evidence of malignancy  july 2019 [facility] restarted but got only one dose again  9-2019 stopped anastrazole for bone pain/arthritic pains, which got better, then placed on letrozole but made her feel bad so she stopped it. 10-19 restarted anastrazole   Feb 2020 stopped AI  Feb 2020 Mammogram normal BIRADS cat 2  June 2020 restarted taking anastrazole. 7/9/2020 limited right breast axillary ultrasound revealed highly suspicious right breast 9:00 mass correlating to the palpable complaint. 7/14/2020 right breast area mass biopsy revealed invasive ductal carcinoma grade 2. ER greater than 95% positive. [place] negative. HER-2 by IHC negative HER-2 by FISH pending. 7/16/2026 PET scan revealed metabolically active nodule along the inferior lateral margin of the right mastectomy site concerning for recurrent disease. This has slowly enlarged since 11/21/2017. No other metabolically active disease. 8/7/20: underwent excision with skin flap. Tumor size was 3cm, Grade 2, DCIS/LCIS not present. ER positive >95%, [place] negative and her 2 neg by IHC and FISH. Margins <1mm     No recs for adjuvant XRT per Dr Hans Cabot. Resumed AI September 2020    PAST MEDICAL HISTORY:   1. Hypertension since 1980s.,  2. Asthma since 2008.,  3. Allergies to mold, animals, dust, et [place], seem to bother her ,  4. Arthritis since early 2000 involving her knee and neck. ,  5. Colonoscopy done in 2011 with biopsy of transverse colon showed benign mucosal lymphoid aggregates and a tubular adenoma from the ascending colon. The rest was negative. (Done by Dr. Marylin Bañuelos.) ,  6. CAT scan of the chest, which showed a 3 cm anterior chest wall nodule unchanged from July 2011, also present in February of 2009, when it was 2.5 cm.,  7. Chest x-ray: Nov 2015 No acute cardiopulmonary findings. ,  8. Mammogram of the left breast was negative for any lesions. PAST SURGICAL HISTORY:   1. History of breast cancer as listed above. 2. Squamous cell cancer removed from the leg in 2004. 3. Cholecystectomy in 2004.    4. Heel spur surgery .,  5. Right Total knee replacement in 2011.  6. Lesion removed from her left arm in . 7. Joint replacement in her thumb in May of 2012. 8. Cystic lesions removed by Dr Rodriguez Blanton from her finger Sep 2014. 9. ear surgery 2015     FAMILY HISTORY:   Both father and mother had coronary artery disease. There is also a history of cancer of the colon and liver in the family. Sister had hepatitis C.     SOCIAL HISTORY:   She has never smoked. She is a nondrinker. She is  since . Her   of a heart attack. 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 Interval History   10/14/21: Alone to the clinic today. She reported that dentist told her that he saw 8mm lesion under the tongue and wanted it be checked by oral surgeon. Left ear pain which radiates to the neck. No swelling. No discharge. No fever or any cough. Denied any chest pain, increased shortness of breath, palpitations. Reported that she is compliant with anastrozole and is tolerating fairly well. Denied any abdominal pain, nausea, vomiting,constipation. Denied any  symptoms. LE edema bilaterally and has been using compressor which helps a lot. No new masses. Review of Systems  Per interval history; otherwise 10 point ROS is negative      Vital Signs:  BP (!) 183/94 (Site: Left Lower Arm, Position: Sitting, Cuff Size: Large Adult)   Pulse 88   Temp 96.8 °F (36 °C) (Temporal)   Resp 18   Ht 5' 5\" (1.651 m)   Wt 205 lb (93 kg)   SpO2 97%   BMI 34.11 kg/m²     Physical Exam:      CONSTITUTIONAL: awake, alert, obese, ambulates with a walker   EYES: mahsa, no palor or any icetrus   ENT: ATNC, very small shallow ?ulcer left side of the frenulum maybe, left ear, ?cholesteotoma. NECK: no JVD   HEMATOLOGIC/LYMPHATIC: no cervical, supraclavicular or axillary lymphadenopathy   LUNGS: ctab  Breast; Rt mastectomy.  Right axillary scar very well healed, no pain, no discharge  CARDIOVASCULAR: s1s2 rrr no murmurs   ABDOMEN: soft ntnd bs pos  NEUROLOGIC:GI   SKIN: No rash   EXTREMITIES:Bilateral LE edema 1-2+      Labs:    Hematology:  Lab Results   Component Value Date    WBC 4.8 08/23/2021    RBC 4.07 (L) 08/23/2021    HGB 14.2 08/23/2021    HCT 41.6 08/23/2021    .2 (H) 08/23/2021    MCH 34.9 (H) 08/23/2021    MCHC 34.1 08/23/2021    RDW 12.3 08/23/2021     08/23/2021    MPV 9.7 08/23/2021    BANDSPCT 2 (L) 11/21/2017    SEGSPCT 57.8 08/23/2021    EOSRELPCT 2.5 08/23/2021    BASOPCT 0.4 08/23/2021    LYMPHOPCT 27.8 08/23/2021    MONOPCT 11.5 (H) 08/23/2021    BANDABS 0.10 11/21/2017    SEGSABS 2.8 08/23/2021    EOSABS 0.1 08/23/2021    BASOSABS 0.0 08/23/2021    LYMPHSABS 1.3 08/23/2021    MONOSABS 0.6 08/23/2021    DIFFTYPE AUTOMATED DIFFERENTIAL 08/23/2021    POLYCHROM 1+ 12/12/2016    PLTM FEW 12/12/2016     Lab Results   Component Value Date    ESR 12 03/08/2017       Chemistry:  Lab Results   Component Value Date     08/23/2021    K 4.0 08/23/2021    CL 99 08/23/2021    CO2 31 08/23/2021    BUN 16 08/23/2021    CREATININE 0.7 08/23/2021    GLUCOSE 98 08/23/2021    CALCIUM 10.0 08/23/2021    PROT 6.7 08/23/2021    LABALBU 4.4 08/23/2021    BILITOT 0.6 08/23/2021    ALKPHOS 74 08/23/2021    AST 15 08/23/2021    ALT 23 08/23/2021    LABGLOM >60 08/23/2021    GFRAA >60 08/23/2021    PHOS 1.9 (L) 09/09/2019    MG 2.0 12/14/2020    POCGLU 90 12/14/2020     Lab Results   Component Value Date     10/17/2017     No components found for: LD  Lab Results   Component Value Date    TSHHS 2.780 12/15/2020    T4FREE 1.32 12/15/2020    FT3 3.2 04/27/2017       Immunology:  Lab Results   Component Value Date    PROT 6.7 08/23/2021     No results found for: Brant Pacheco, KLFLCR  No results found for: B2M    Coagulation Panel:  Lab Results   Component Value Date    PROTIME 10.9 (L) 12/14/2020    INR 0.90 12/14/2020    APTT 23.8 04/27/2017    DDIMER 919 (H) 12/12/2016       Anemia Panel:  Lab Results   Component Value Date    [id number] 579.2 12/15/2020    FOLATE 19.1 (H) 12/15/2020       Tumor Markers:  Lab Results   Component Value Date    CEA 2.1 01/22/2019    LABCA2 14.3 08/23/2021         Imaging: Reviewed     Pathology:Reviewed     Observations:  Performance Status: ECOG 2   Depression Status: PHQ 9 neg       Assessment & Plan:  1. Ca Breast 1972 with Right Ax Recurrence Nov 2012   Initial diagnosis in 1972 when she had right modified radical mastectomy. She had isolated local recurrences in the early eighties, for which she had excision, followed by radiation therapy and hormonal therapy. After being off treatment for many years, she had another recurrence in November of 2012 and after excision, she had minimal residual disease and has been on maintenance Arimidex for five years after initial chemotherapy with a taxane. Dr Humphrey Lefort recommendation was to keep her on hormonal therapy for at least 10 years or even longer. Has been off of AI intermittently for bone pain, last time since feb 2020. Resumed Luma 15 2020   PET with avid right axillary mass, biopsy-proven invasive ductal carcinoma [place] ER positive [place] negative and HER-2 negative. No other metastatic disease. S/P surgical resection  No recs for any advunat XRT  Plan to Continue AI and monitor for AE. Ca 27-29 feb 2021 12, CBC and CMP normal  Dexa scan 11/2017 with osteopenia. Recommend continuation of calcium and vitamin D. Repeat dexa scan October 2020 with osteopenia in the hips. Will repeat in 2022  Left breast Mammogram July 2021 was normal, repeat in a year. .    LE edema:Compressor helping . Is on lasix, Compression stockings and also recommend feet elevation    PORT removed 2021    Left ear pain: Exam with possible ? cholesteotoma vs wax. Recommend ENT follow up. Ciprodex prescribed.      Mild hypokalemia:  Continue K supplements OD as she is on lasix    Macrocytosis without anemia: Continue to monitor    Skin lesions appears benign, continue to monitor    Continue other medical care    Discussed the above findings and plan with her and she verbalized understanding    Discussed Lifestyle, healthy diet and regular exercise as tolerated. Also discussed importance of being up-to-date with age-appropriate screening tools, Left mammogram due again feb 2021. Recommend follow-up with primary care physician and other specialists. Please do not hesitate to contact us if you need ay further information. Return to clinic December 2021 or earlier if new symptoms. I have recommended that the patient follow CDC guidelines for prevention of COVID-19 infection.   She has received Covid vaccine      [place]

## 2021-12-06 RX ORDER — POTASSIUM CHLORIDE 20 MEQ/1
TABLET, EXTENDED RELEASE ORAL
Qty: 30 TABLET | Refills: 5 | Status: SHIPPED | OUTPATIENT
Start: 2021-12-06 | End: 2022-02-07

## 2021-12-07 ENCOUNTER — HOSPITAL ENCOUNTER (OUTPATIENT)
Dept: PHYSICAL THERAPY | Age: 76
Setting detail: THERAPIES SERIES
Discharge: HOME | End: 2021-12-07
Payer: MEDICARE

## 2021-12-07 PROCEDURE — 97110 THERAPEUTIC EXERCISES: CPT

## 2021-12-07 PROCEDURE — 97162 PT EVAL MOD COMPLEX 30 MIN: CPT

## 2021-12-07 PROCEDURE — 97535 SELF CARE MNGMENT TRAINING: CPT

## 2021-12-07 NOTE — FLOWSHEET NOTE
Outpatient Physical Therapy  [place]           [x] Phone: [phone]   Fax: [phone]  [place]           [] Phone: [phone]   Fax: [phone]        Physical Therapy Daily Treatment Note  Date:  2021    Patient Name:  Marni Palomino    :  1945  MRN: [id number]  Restrictions/Precautions: None  Diagnosis:   Diagnosis: unsteady gait  Date of Injury/Surgery: --  Treatment Diagnosis: Treatment Diagnosis: Balance and gait dysfunction    Insurance/Certification information: PT Insurance Information: [employer] Medicare   Referring Physician:  Referring Practitioner: Dr. Elvis Arizmendi  Next Doctor Visit: --   Plan of care signed (Y/N): N, sent 21    Outcome Measure: next ABC, Cotton Balance:   Visit# / total visits:   1/10  Pain level: 0/10   Goals:     Patient goals : improve ability to get around her home  Short term goals  Time Frame for Short term goals: Pt demo I w/ HEP and symptom management  Short term goal 1: Pt reports no falls since the start of therpy and utilizes her rollator for all upright activities  Short term goal 2: Pt demo Cotton balance score >30/56 to improve fall risk  Short term goal 3: Pt demo >4/5 strength in all directions for BLE to improve tolerance to functional transfers  Short term goal 4: Pt demo 5x STS assessment <25 seconds with UE assist from a low chair  Short term goal 5: Pt demo >500 ft on the 6MWT assessment with rollator to improve tolerance to endurance activities       Summary of Evaluation:   Pt is 68year old male with gradual return of balance deficits within the last year. She is on a series of cancer medications that have some SE of dizziness (approx. 18 months ago according to pt). She has used a rollator for several years, but states she is unable to stand on one leg anymore and has frequent LOB, but no falls in the last 6 months.  Pt demo deficits this date that include high fall risk according to [facility] balance assessment, decreased BLE strength, and discussed with patient. Pt agreed to comply. Manual Treatments:  --      Modalities:  --      Communication with other providers:  danyelle sent 12/7/21       Assessment:  (Response towards treatment session) (Pain Rating)  Pt is 68year old male with gradual return of balance deficits within the last year. She is on a series of cancer medications that have some SE of dizziness (approx. 18 months ago according to pt). She has used a rollator for several years, but states she is unable to stand on one leg anymore and has frequent LOB, but no falls in the last 6 months. Pt demo deficits this date that include high fall risk according to [facility] balance assessment, decreased BLE strength, dizziness with transitions involving body/head turns, decreased functional transfers, reduced endurance, gait deficits. Due to patient complaints about ongoing dizziness/room spinning, had other physical therapist, Cherelle Woodson, check patient for BPPV, gaze evoke, etc. since patient has previous history of some of these diagnoses. Cherelle Woodson cleared patient from having any of these and states patient will likely benefit from standing balance activities involving unstable surfaces and various stances. Pt will benefit with PT services with BLE strengthening, gait training, endurance, balance activities involving airex foam, EO/EC, and varied stances to return to PLOF. Patient received education on their current pathology and how their condition effects them with their functional activities. Patient understood discussion and questions were answered. Patient understands their activity limitations and understands rational for treatment progression.           Plan for Next Session:        Time In / Time Out: 4089-9664          If [facility] Please Indicate Time In/Out/Total Time  CPT Code Time in Time out Total Time                                                            Total for session             Timed Code/Total Treatment Minutes:  79'   (1) PT eval  (1) TE 15'  (1) ADL 15'      Next Progress Note due:  10th visit      Plan of Care Interventions:  [x] Therapeutic Exercise  [] Modalities:  [x] Therapeutic Activity     [] Ultrasound  [] Estim  [x] Gait Training      [] Cervical Traction [] Lumbar Traction  [x] Neuromuscular Re-education    [] Cold/hotpack [] Iontophoresis   [x] Instruction in HEP      [] Vasopneumatic   [] Dry Needling    [] Manual Therapy               [] Aquatic Therapy              Electronically signed by:  Song Ibarra, PT, DPT, CSCS 12/8/2021, 2:29 PM

## 2021-12-07 NOTE — PLAN OF CARE
Outpatient Physical Therapy           [place]           [] Phone: [phone]   Fax: [phone]  [facility]           [] Phone: [phone]   Fax: [phone]     To: Referring Practitioner: Dr. Radha Watkins    From: Sahil Ordonez PT     Patient: Rochelle Arvizu       : 1945  Diagnosis: Diagnosis: unsteady gait   Treatment Diagnosis: Treatment Diagnosis: Balance and gait dysfunction   Date: 2021    Physical Therapy Certification/Re-Certification Form  Dear Dr. Radha Watkins,  The following patient has been evaluated for physical therapy services and for therapy to continue, insurance requires physician review of the treatment plan initially and every 90 days. Please review the attached evaluation and/or summary of the patient's plan of care, and verify that you agree therapy should continue by signing the attached document and sending it back to our office. Assessment:  Pt is 68year old male with gradual return of balance deficits within the last year. She is on a series of cancer medications that have some SE of dizziness (approx. 18 months ago according to pt). She has used a rollator for several years, but states she is unable to stand on one leg anymore and has frequent LOB, but no falls in the last 6 months. Pt demo deficits this date that include high fall risk according to [facility] balance assessment, decreased BLE strength, dizziness with transitions involving body/head turns, decreased functional transfers, reduced endurance, gait deficits. Due to patient complaints about ongoing dizziness/room spinning, had other physical therapist, Ranulfo Bingham, check patient for BPPV, gaze evoke, etc. since patient has previous history of some of these diagnoses. Ranulfo Bingham cleared patient from having any of these and states patient will likely benefit from standing balance activities involving unstable surfaces and various stances.  Pt will benefit with PT services with BLE strengthening, gait training, endurance, balance activities involving airex foam, EO/EC, and varied stances to return to PLOF. Patient received education on their current pathology and how their condition effects them with their functional activities. Patient understood discussion and questions were answered. Patient understands their activity limitations and understands rational for treatment progression.         Plan of Care/Treatment to date:  [x] Therapeutic Exercise  [] Modalities:  [x] Therapeutic Activity     [] Ultrasound  [] Electrical Stimulation  [x] Gait Training      [] Cervical Traction [] Lumbar Traction  [x] Neuromuscular Re-education    [] Cold/hotpack [] Iontophoresis   [x] Instruction in HEP      [] Vasopneumatic    [] Dry Needling  [] Manual Therapy               [] Aquatic Therapy       Other:          Frequency/Duration:  # Days per week: [] 1 day # Weeks: [] 1 week [x] 5 weeks     [x] 2 days   [] 2 weeks [] 6 weeks     [] 3 days   [] 3 weeks [] 7 weeks     [] 4 days   [] 4 weeks [] 8 weeks         [] 9 weeks [] 10 weeks         [] 11 weeks [] 12 weeks    Rehab Potential/Progress: [] Excellent [] Good [x] Fair  [] Poor     Goals:    Patient goals : improve ability to get around her home  Short term goals  Time Frame for Short term goals: Pt demo I w/ HEP and symptom management  Short term goal 1: Pt reports no falls since the start of therpy and utilizes her rollator for all upright activities  Short term goal 2: Pt demo Cotton balance score >30/56 to improve fall risk  Short term goal 3: Pt demo >4/5 strength in all directions for BLE to improve tolerance to functional transfers  Short term goal 4: Pt demo 5x STS assessment <25 seconds with UE assist from a low chair  Short term goal 5: Pt demo >500 ft on the 6MWT assessment with rollator to improve tolerance to endurance activities         Electronically signed by:  Estefania Victoria, PT, DPT, CSCS 12/8/2021, 2:29 PM        If you have any questions or concerns, please don't hesitate to call.  Thank you for your referral.      Physician Signature:________________________________Date:_________ TIME: _____  By signing above, therapists plan is approved by physician

## 2021-12-08 NOTE — PROGRESS NOTES
Physical Therapy  Initial Assessment  Date: 2021  Patient Name: Jad Desai  MRN: [id number]  : 1945     Treatment Diagnosis: Balance and gait dysfunction    Subjective   General  Chart Reviewed: Yes  Patient assessed for rehabilitation services?: Yes  Referring Practitioner: Dr. Erlinda Garcia  Diagnosis: unsteady gait  Follows Commands: Within Functional Limits  PT Visit Information  PT Insurance Information: [employer]  Subjective  Subjective: Patient reports she is taking Jaylan Binder for her cancer and believes it is making her dizzy and losing her balance; she started taking this medication approx. = 18 months. Reports having difficulties standing on one foot and has been using a rolling walker which she has used for years. She did some physical therapy about a year ago for her dizziness (vertigo) and had some success with her therapy. She has some issue with her inner ear and is seeing an ENT currently. She reports the room is spinning around her when she sits up in bed and making R head/body turns; states this has worsened since her previous therapy. She is 46 y/o cancer survivor (breast cancer), potassium issues; wears no compression stockings due to ongoing swelling/edema (lymphedema of BLE and RUE). She is walking 2,000 steps a day. Utilize the walker for outdoor activities, does not use an AD inside her home. No reported falls in the last 6 months. She lives alone, one level, one step to enter w/ handrails. She reports difficulties showering due to closing her eyes and losing her balance, shower chair, no handrails.   Pain Screening  Patient Currently in Pain: No    Orientation  Orientation  Overall Orientation Status: Within Normal Limits    Social/Functional History  Social/Functional History  Lives With: Alone  Type of Home: House  Home Layout: One level  Home Access: Stairs to enter with rails  Entrance Stairs - Number of Steps: 1  Bathroom Shower/Tub: Walk-in shower  Bathroom Toilet: Standard  Bathroom Equipment: Shower chair  Home Equipment: Rolling walker  ADL Assistance: Independent  Homemaking Assistance: Independent  Ambulation Assistance: Independent  Transfer Assistance: Independent  Active : Yes  Mode of Transportation: Car  Occupation: Retired    Objective  Observation/Palpation  Posture: Fair  Observation: Patient ambulates with rollator, decreased step length and increased AMADEO with outstretched arms when ambulating without rollator; noted significant increases in instability without AD    PROM RLE (degrees)  RLE PROM: WNL  AROM RLE (degrees)  RLE AROM: WNL  PROM LLE (degrees)  LLE PROM: WNL  AROM LLE (degrees)  LLE AROM : WNL    Strength Other  Other: RLE Strength: 4-/5 hip flexion, 5/5 knee extension, 4+/5 knee flexion, 5/5 hip abd, 4+/5 hip addLLE Strength: 4/5 hip flexion, 5/5 knee extension, 4-/5 knee flexion, 5/5 hip abd, 4+/5 hip add     Additional Measures  Special Tests: Due to patient complaints about ongoing dizziness/room spinning, had other physical therapist, Jorge Brito, check patient for BPPV, gaze evoke, etc. since patient has previous history of some of these diagnoses. Jorge Brito cleared patient from having any of these and states patient will likely benefit from standing balance activities involving unstable surfaces and various stances. Other: 6MWT: 592 ft with walker and no rest breaks      5x STS: 15 times, no UE assist    Assessment   Conditions Requiring Skilled Therapeutic Intervention  Body structures, Functions, Activity limitations: Decreased functional mobility ; Decreased ADL status; Decreased balance; Decreased strength; Decreased endurance  Pt is 68year old male with gradual return of balance deficits within the last year. She is on a series of cancer medications that have some SE of dizziness (approx. 18 months ago according to pt).  She has used a rollator for several years, but states she is unable to stand on one leg anymore and has frequent LOB, but no falls in the last 6 months. Pt demo deficits this date that include high fall risk according to [facility] balance assessment, decreased BLE strength, dizziness with transitions involving body/head turns, decreased functional transfers, reduced endurance, gait deficits. Due to patient complaints about ongoing dizziness/room spinning, had other physical therapist, Ortiz Mart, check patient for BPPV, gaze evoke, etc. since patient has previous history of some of these diagnoses. Ortiz Mart cleared patient from having any of these and states patient will likely benefit from standing balance activities involving unstable surfaces and various stances. Pt will benefit with PT services with BLE strengthening, gait training, endurance, balance activities involving airex foam, EO/EC, and varied stances to return to PLOF. Patient received education on their current pathology and how their condition effects them with their functional activities. Patient understood discussion and questions were answered. Patient understands their activity limitations and understands rational for treatment progression.    Treatment Diagnosis: Balance and gait dysfunction  Prognosis: Good  Decision Making: Medium Complexity  History: previous vertigo, breast cancer survivor (R mastectomy)  Barriers to Learning: None-prefers demo  REQUIRES PT FOLLOW UP: Yes  Activity Tolerance  Activity Tolerance: Patient Tolerated treatment well     Plan   Plan  Times per week: 2x  Plan weeks: 5 weeks  Current Treatment Recommendations: Strengthening, Neuromuscular Re-education, Home Exercise Program, ROM, Balance Training, Endurance Training, Stair training, Gait Training, Transfer Training, Modalities    Goals  Short term goals  Time Frame for Short term goals: Pt demo I w/ HEP and symptom management  Short term goal 1: Pt reports no falls since the start of therpy and utilizes her rollator for all upright activities  Short term goal 2: Pt demo Cotton balance score >30/56 to improve fall risk  Short term goal 3: Pt demo >4/5 strength in all directions for BLE to improve tolerance to functional transfers  Short term goal 4: Pt demo 5x STS assessment <25 seconds with UE assist from a low chair  Short term goal 5: Pt demo >500 ft on the 6MWT assessment with rollator to improve tolerance to endurance activities  Patient Goals   Patient goals : improve ability to get around her home     Dann Holbrook, PT, DPT, CSCS

## 2021-12-10 ENCOUNTER — HOSPITAL ENCOUNTER (OUTPATIENT)
Dept: INFUSION THERAPY | Age: 76
Discharge: HOME | End: 2021-12-10
Payer: MEDICARE

## 2021-12-10 DIAGNOSIS — C50.411 MALIGNANT NEOPLASM OF UPPER-OUTER QUADRANT OF RIGHT BREAST IN FEMALE, ESTROGEN RECEPTOR POSITIVE (HCC): ICD-10-CM

## 2021-12-10 DIAGNOSIS — C50.911 CHEST WALL RECURRENCE OF BREAST CANCER, RIGHT (HCC): ICD-10-CM

## 2021-12-10 DIAGNOSIS — R42 DIZZINESS: ICD-10-CM

## 2021-12-10 DIAGNOSIS — Z17.0 MALIGNANT NEOPLASM OF UPPER-OUTER QUADRANT OF RIGHT BREAST IN FEMALE, ESTROGEN RECEPTOR POSITIVE (HCC): ICD-10-CM

## 2021-12-10 DIAGNOSIS — C79.89 CHEST WALL RECURRENCE OF BREAST CANCER, RIGHT (HCC): ICD-10-CM

## 2021-12-10 LAB
ALBUMIN SERPL-MCNC: 4.7 GM/DL (ref 3.4–5)
ALP BLD-CCNC: 77 IU/L (ref 40–128)
ALT SERPL-CCNC: 20 U/L (ref 10–40)
ANION GAP SERPL CALCULATED.3IONS-SCNC: 10 MMOL/L (ref 4–16)
AST SERPL-CCNC: 21 IU/L (ref 15–37)
BASOPHILS ABSOLUTE: 0 K/CU MM
BASOPHILS RELATIVE PERCENT: 0.5 % (ref 0–1)
BILIRUB SERPL-MCNC: 0.4 MG/DL (ref 0–1)
BUN BLDV-MCNC: 14 MG/DL (ref 6–23)
CALCIUM SERPL-MCNC: 10.1 MG/DL (ref 8.3–10.6)
CHLORIDE BLD-SCNC: 100 MMOL/L (ref 99–110)
CO2: 30 MMOL/L (ref 21–32)
CREAT SERPL-MCNC: 0.7 MG/DL (ref 0.6–1.1)
DIFFERENTIAL TYPE: ABNORMAL
EOSINOPHILS ABSOLUTE: 0.1 K/CU MM
EOSINOPHILS RELATIVE PERCENT: 1.8 % (ref 0–3)
GFR AFRICAN AMERICAN: >60 ML/MIN/1.73M2
GFR NON-AFRICAN AMERICAN: >60 ML/MIN/1.73M2
GLUCOSE BLD-MCNC: 98 MG/DL (ref 70–99)
HCT VFR BLD CALC: 42.8 % (ref 37–47)
HEMOGLOBIN: 14.4 GM/DL (ref 12.5–16)
LYMPHOCYTES ABSOLUTE: 1.4 K/CU MM
LYMPHOCYTES RELATIVE PERCENT: 24.3 % (ref 24–44)
MCH RBC QN AUTO: 34.4 PG (ref 27–31)
MCHC RBC AUTO-ENTMCNC: 33.6 % (ref 32–36)
MCV RBC AUTO: 102.1 FL (ref 78–100)
MONOCYTES ABSOLUTE: 0.5 K/CU MM
MONOCYTES RELATIVE PERCENT: 8.6 % (ref 0–4)
PDW BLD-RTO: 12.7 % (ref 11.7–14.9)
PLATELET # BLD: 223 K/CU MM (ref 140–440)
PMV BLD AUTO: 9.6 FL (ref 7.5–11.1)
POTASSIUM SERPL-SCNC: 3.7 MMOL/L (ref 3.5–5.1)
RBC # BLD: 4.19 M/CU MM (ref 4.2–5.4)
SEGMENTED NEUTROPHILS ABSOLUTE COUNT: 3.6 K/CU MM
SEGMENTED NEUTROPHILS RELATIVE PERCENT: 64.8 % (ref 36–66)
SODIUM BLD-SCNC: 140 MMOL/L (ref 135–145)
TOTAL PROTEIN: 6.8 GM/DL (ref 6.4–8.2)
WBC # BLD: 5.6 K/CU MM (ref 4–10.5)

## 2021-12-10 PROCEDURE — 80053 COMPREHEN METABOLIC PANEL: CPT

## 2021-12-10 PROCEDURE — 85025 COMPLETE CBC W/AUTO DIFF WBC: CPT

## 2021-12-10 PROCEDURE — 36415 COLL VENOUS BLD VENIPUNCTURE: CPT

## 2021-12-10 PROCEDURE — 86300 IMMUNOASSAY TUMOR CA 15-3: CPT

## 2021-12-14 ENCOUNTER — HOSPITAL ENCOUNTER (OUTPATIENT)
Dept: INFUSION THERAPY | Age: 76
Discharge: HOME | End: 2021-12-14
Payer: MEDICARE

## 2021-12-14 ENCOUNTER — OFFICE VISIT (OUTPATIENT)
Dept: ONCOLOGY | Age: 76
End: 2021-12-14
Payer: MEDICARE

## 2021-12-14 VITALS
OXYGEN SATURATION: 96 % | TEMPERATURE: 96.6 F | SYSTOLIC BLOOD PRESSURE: 149 MMHG | BODY MASS INDEX: 35.19 KG/M2 | WEIGHT: 211.2 LBS | RESPIRATION RATE: 18 BRPM | HEIGHT: 65 IN | DIASTOLIC BLOOD PRESSURE: 67 MMHG | HEART RATE: 89 BPM

## 2021-12-14 DIAGNOSIS — C50.411 MALIGNANT NEOPLASM OF UPPER-OUTER QUADRANT OF RIGHT BREAST IN FEMALE, ESTROGEN RECEPTOR POSITIVE (HCC): Primary | ICD-10-CM

## 2021-12-14 DIAGNOSIS — Z17.0 MALIGNANT NEOPLASM OF UPPER-OUTER QUADRANT OF RIGHT BREAST IN FEMALE, ESTROGEN RECEPTOR POSITIVE (HCC): Primary | ICD-10-CM

## 2021-12-14 DIAGNOSIS — Z12.31 SCREENING MAMMOGRAM FOR HIGH-RISK PATIENT: ICD-10-CM

## 2021-12-14 LAB — CA 27.29: 14.2 U/ML

## 2021-12-14 PROCEDURE — 99211 OFF/OP EST MAY X REQ PHY/QHP: CPT

## 2021-12-14 PROCEDURE — 99214 OFFICE O/P EST MOD 30 MIN: CPT | Performed by: INTERNAL MEDICINE

## 2021-12-14 NOTE — PROGRESS NOTES
MA Rooming Questions  Patient: Rhett Nur  MRN: [id number]    Date: 12/14/2021        1. Do you have any new issues? yes - has some questions?, balance issues-is doing physical therapy          2. Do you need any refills on medications?    no    3. Have you had any imaging done since your last visit?   no    4. Have you been hospitalized or seen in the emergency room since your last visit here?   no    5. Did the patient have a depression screening completed today?  No    No data recorded     PHQ-9 Given to (if applicable):               PHQ-9 Score (if applicable):                     [] Positive     []  Negative              Does question #9 need addressed (if applicable)                     [] Yes    []  No               Laureano Steward CMA

## 2021-12-14 NOTE — PROGRESS NOTES
Nilson Jesus Name: Keturah Durham  Patient : 1945  Patient MRN: [id number]     Primary Oncologist: Vita Reyes MD  PCP:Dr Lani Ku  Surgeon Dr Lee Sierra     Date of Service: 21    Chief Complaint:   Chief Complaint   Patient presents with    Follow-up        Active Problem list   Breast cancer    HPI  1. Mrs. Akua Jennings ( 45) has a history of right breast cancer, for which she had modified radical mastectomy in . She had a couple of local recurrences, treated with chemotherapy and radiation therapy and excision of the chest wall recurrences. 2. In  she had a total abdominal hysterectomy/bilateral salpingo-oophorectomy, which was done as part of hormonal therapy and Tamoxifen for one year. After that she did not show any evidence of recurrence. 3. In  she presented with right axillary mass which was biopsied and confirmed to be an invasive carcinoma, morphologically consistent with origin from her breast primary. Her ER was positive 90 percent, AL positive. HER-2/naomi was negative by FISH. 4. All other w/u including CT of the chest, abdomen and pelvis showed no evidence of any intraabdominal mass or metastases. Bone scan was negative for any bony mets. Her MUGA scan showed 61 percent ejection fraction. 5. She was started on Taxol d1&8 [id number] for 6 courses starting in Dec of 2012 till 2013 and showed good partial response with mass size reduced to 2.4 cm in  from 3 cm in 2012 and completed 6 courses in 2013. Right Ax mass completely resolved over next couple years. 6. In May 2013 she was initiated on Hormonal therapy with Arimidex 1 mg daily for maintenance treatment. 7. In continued Clinical remission. 2017 Hemoglobin 14.3, hematocrit 42, white count 5,900, platelet 386,151, BUN 17, creatinine 0.7. Sodium was 141, potassium 3.4, chloride 95, calcium 10.0. The rest of the chemistries are normal. LFTs normal, , uric acid 6.0. CEA 1.6, CA 27 14. 11-17 DEXA scan showed osteopenia, CT chest abdomen pelvis showed no evidence of metastatic disease, fatty liver, right renal nonobstructing parapelvic cyst, diverticulosis. Nuclear med bone scan showed no evidence to suggest osseous metastatic disease. June 2018 Jesse Forbes started but never got more than 1  July 2018 Left breast with no mammographic evidence of malignancy  july 2019 Jesse Forbes restarted but got only one dose again  9-2019 stopped anastrazole for bone pain/arthritic pains, which got better, then placed on letrozole but made her feel bad so she stopped it. 10-19 restarted anastrazole   Feb 2020 stopped AI  Feb 2020 Mammogram normal BIRADS cat 2  June 2020 restarted taking anastrazole. 7/9/2020 limited right breast axillary ultrasound revealed highly suspicious right breast 9:00 mass correlating to the palpable complaint. 7/14/2020 right breast area mass biopsy revealed invasive ductal carcinoma grade 2. ER greater than 95% positive. KS negative. HER-2 by IHC negative HER-2 by FISH pending. 7/16/2026 PET scan revealed metabolically active nodule along the inferior lateral margin of the right mastectomy site concerning for recurrent disease. This has slowly enlarged since 11/21/2017. No other metabolically active disease. 8/7/20: underwent excision with skin flap. Tumor size was 3cm, Grade 2, DCIS/LCIS not present. ER positive >95%, KS negative and her 2 neg by IHC and FISH. Margins <1mm     No recs for adjuvant XRT per Dr Tom Carrasco. Resumed AI September 2020 7/22/2021 left screening mammogram.  No mammographic evidence of malignancy. Status post removal of the left chest wall port. PAST MEDICAL HISTORY:   1. Hypertension since 1980s.,  2. Asthma since 2008.,  3. Allergies to mold, animals, dust, et [place]en Bella, seem to bother her ,  4. Arthritis since early 2000 involving her knee and neck. ,  5.  Colonoscopy done in 2011 with biopsy of transverse colon showed benign mucosal lymphoid aggregates and a tubular adenoma from the ascending colon. The rest was negative. (Done by Dr. Melita Yates.) ,  6. CAT scan of the chest, which showed a 3 cm anterior chest wall nodule unchanged from 2011, also present in 2009, when it was 2.5 cm.,  7. Chest x-ray: 2015 No acute cardiopulmonary findings. ,  8. Mammogram of the left breast was negative for any lesions. PAST SURGICAL HISTORY:   1. History of breast cancer as listed above. 2. Squamous cell cancer removed from the leg in . 3. Cholecystectomy in . 4. Heel spur surgery .,  5. Right Total knee replacement in 2011.  6. Lesion removed from her left arm in . 7. Joint replacement in her thumb in May of 2012. 8. Cystic lesions removed by Dr Isaiah Kruger from her finger Sep 2014. 9. ear surgery 2015     FAMILY HISTORY:   Both father and mother had coronary artery disease. There is also a history of cancer of the colon and liver in the family. Sister had hepatitis C.     SOCIAL HISTORY:   She has never smoked. She is a nondrinker. She is  since . Her   of a heart attack. 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 Interval History   21: Arrived alone to the clinic today. Is taking K supplements. Has lymphedema and using compression stockings. Gets nervous too quickly. Reported that she feels shaky between meals. No chest pain. No new mass, axillary lad.      Review of Systems  Per interval history; otherwise 10 point ROS is negative      Vital Signs:  BP (!) 149/67 (Site: Left Upper Arm, Position: Sitting, Cuff Size: Large Adult)   Pulse 89   Temp 96.6 °F (35.9 °C) (Temporal)   Resp 18   Ht 5' 5\" (1.651 m)   Wt 211 lb 3.2 oz (95.8 kg)   SpO2 96%   BMI 35.15 kg/m²     Physical Exam:      CONSTITUTIONAL: awake, alert, obese, ambulates with a walker   EYES: mahsa, no palor or any icetrus   ENT: ATNC   NECK: no JVD   HEMATOLOGIC/LYMPHATIC: no cervical, supraclavicular or axillary lymphadenopathy   LUNGS: ctab  Breast; Rt mastectomy. Right axillary scar very well healed, no pain, no discharge.   Left breast normal  CARDIOVASCULAR: s1s2 rrr no murmurs   ABDOMEN: soft ntnd bs pos  NEUROLOGIC:GI   SKIN: No rash   EXTREMITIES:Bilateral LE edema 2-3+      Labs:    Hematology:  Lab Results   Component Value Date    WBC 5.6 12/10/2021    RBC 4.19 (L) 12/10/2021    HGB 14.4 12/10/2021    HCT 42.8 12/10/2021    .1 (H) 12/10/2021    MCH 34.4 (H) 12/10/2021    MCHC 33.6 12/10/2021    RDW 12.7 12/10/2021     12/10/2021    MPV 9.6 12/10/2021    BANDSPCT 2 (L) 11/21/2017    SEGSPCT 64.8 12/10/2021    EOSRELPCT 1.8 12/10/2021    BASOPCT 0.5 12/10/2021    LYMPHOPCT 24.3 12/10/2021    MONOPCT 8.6 (H) 12/10/2021    BANDABS 0.10 11/21/2017    SEGSABS 3.6 12/10/2021    EOSABS 0.1 12/10/2021    BASOSABS 0.0 12/10/2021    LYMPHSABS 1.4 12/10/2021    MONOSABS 0.5 12/10/2021    DIFFTYPE AUTOMATED DIFFERENTIAL 12/10/2021    POLYCHROM 1+ 12/12/2016    PLTM FEW 12/12/2016     Lab Results   Component Value Date    ESR 12 03/08/2017       Chemistry:  Lab Results   Component Value Date     12/10/2021    K 3.7 12/10/2021     12/10/2021    CO2 30 12/10/2021    BUN 14 12/10/2021    CREATININE 0.7 12/10/2021    GLUCOSE 98 12/10/2021    CALCIUM 10.1 12/10/2021    PROT 6.8 12/10/2021    LABALBU 4.7 12/10/2021    BILITOT 0.4 12/10/2021    ALKPHOS 77 12/10/2021    AST 21 12/10/2021    ALT 20 12/10/2021    LABGLOM >60 12/10/2021    GFRAA >60 12/10/2021    PHOS 1.9 (L) 09/09/2019    MG 2.0 12/14/2020    POCGLU 90 12/14/2020     Lab Results   Component Value Date     10/17/2017     No components found for: LD  Lab Results   Component Value Date    TSHHS 2.780 12/15/2020    T4FREE 1.32 12/15/2020    FT3 3.2 04/27/2017       Immunology:  Lab Results   Component Value Date    PROT 6.8 12/10/2021     No results found for: KAPPAUVOL, LAMBDAUVOL, KLFLCR  No results found for: B2M    Coagulation Panel:  Lab Results   Component Value Date    PROTIME 10.9 (L) 12/14/2020    INR 0.90 12/14/2020    APTT 23.8 04/27/2017    DDIMER 919 (H) 12/12/2016       Anemia Panel:  Lab Results   Component Value Date    BHDIBRGH74 579.2 12/15/2020    FOLATE 19.1 (H) 12/15/2020       Tumor Markers:  Lab Results   Component Value Date    CEA 2.1 01/22/2019    LABCA2 14.2 12/10/2021         Imaging: Reviewed     Pathology:Reviewed     Observations:  Performance Status: ECOG 2   Depression Status: PHQ 9 neg       Assessment & Plan:  1. Ca Breast 1972 with Right Ax Recurrence Nov 2012   Initial diagnosis in 1972 when she had right modified radical mastectomy. She had isolated local recurrences in the early eighties, for which she had excision, followed by radiation therapy and hormonal therapy. After being off treatment for many years, she had another recurrence in November of 2012 and after excision, she had minimal residual disease and has been on maintenance Arimidex for five years after initial chemotherapy with a taxane. Dr Hugh Adrian recommendation was to keep her on hormonal therapy for at least 10 years or even longer. Has been off of AI intermittently for bone pain, last time since feb 2020. Resumed Luma 15 2020   PET with avid right axillary mass, biopsy-proven invasive ductal carcinoma NV ER positive NV negative and HER-2 negative. No other metastatic disease. S/P surgical resection  No recs for any advunat XRT  Plan to Continue AI and monitor for AE. Dexa scan 11/2017 with osteopenia. Recommend continuation of calcium and vitamin D. Repeat dexa scan October 2020 with osteopenia in the hips. Mammogram left 7/22/2021 normal, recommend repeat in 1 year  Dec , 2021, CA 27-29, CBC and CMP within normal limits    LE edema/lymphedema:Compression stockings  helping . Is on lasix, Also  recommend feet elevation.     Macrocytosis without anemia: Continue to monitor    Continue other medical care    Discussed the above findings and plan with her and she verbalized understanding    Discussed Lifestyle, healthy diet and regular exercise as tolerated. Also discussed importance of being up-to-date with age-appropriate screening tools, Left mammogram due again July 2022. Colonoscopy august 2021 with diverticulitis, hemorrhoids and one polyp. Recommend follow-up with primary care physician and other specialists. Please do not hesitate to contact us if you need ay further information. Return to clinic in  4 months  or earlier if new symptoms. I have recommended that the patient follow CDC guidelines for prevention of COVID-19 infection. Received COVID vaccine, encourage her to get booster. Received flu vaccine.      [address]

## 2021-12-16 ENCOUNTER — HOSPITAL ENCOUNTER (OUTPATIENT)
Dept: PHYSICAL THERAPY | Age: 76
Setting detail: THERAPIES SERIES
Discharge: HOME | End: 2021-12-16
Payer: MEDICARE

## 2021-12-16 PROCEDURE — 97530 THERAPEUTIC ACTIVITIES: CPT

## 2021-12-16 PROCEDURE — 97110 THERAPEUTIC EXERCISES: CPT

## 2021-12-16 PROCEDURE — 97112 NEUROMUSCULAR REEDUCATION: CPT

## 2021-12-16 NOTE — FLOWSHEET NOTE
Outpatient Physical Therapy  [facility]           [x] Phone: [phone]   Fax: [phone]  Kim Yates           [] Phone: [phone]   Fax: [phone]        Physical Therapy Daily Treatment Note  Date:  2021    Patient Name:  Wayne John    :  1945  MRN: [id number]  Restrictions/Precautions: None  Diagnosis:   Diagnosis: unsteady gait  Date of Injury/Surgery: --  Treatment Diagnosis: Treatment Diagnosis: Balance and gait dysfunction    Insurance/Certification information: PT Insurance Information: [employer]   Referring Physician:  Referring Practitioner: Dr. Mikki Trujillo  Next Doctor Visit: --   Plan of care signed (Y/N): N, sent 21    Outcome Measure: next ABC, Cotton Balance:   Visit# / total visits:   2/10  Pain level: 0/10   Goals:     Patient goals : improve ability to get around her home  Short term goals  Time Frame for Short term goals: Pt demo I w/ HEP and symptom management  Short term goal 1: Pt reports no falls since the start of therpy and utilizes her rollator for all upright activities  Short term goal 2: Pt demo Cotton balance score >30/56 to improve fall risk  Short term goal 3: Pt demo >4/5 strength in all directions for BLE to improve tolerance to functional transfers  Short term goal 4: Pt demo 5x STS assessment <25 seconds with UE assist from a low chair  Short term goal 5: Pt demo >500 ft on the 6MWT assessment with rollator to improve tolerance to endurance activities       Summary of Evaluation:   Pt is 68year old male with gradual return of balance deficits within the last year. She is on a series of cancer medications that have some SE of dizziness (approx. 18 months ago according to pt). She has used a rollator for several years, but states she is unable to stand on one leg anymore and has frequent LOB, but no falls in the last 6 months.  Pt demo deficits this date that include high fall risk according to [facility] balance assessment, decreased BLE strength, dizziness with transitions involving body/head turns, decreased functional transfers, reduced endurance, gait deficits. Due to patient complaints about ongoing dizziness/room spinning, had other physical therapist, Lola Candelario, check patient for BPPV, gaze evoke, etc. since patient has previous history of some of these diagnoses. Lola Candelario cleared patient from having any of these and states patient will likely benefit from standing balance activities involving unstable surfaces and various stances. Pt will benefit with PT services with BLE strengthening, gait training, endurance, balance activities involving airex foam, EO/EC, and varied stances to return to PLOF. Patient received education on their current pathology and how their condition effects them with their functional activities. Patient understood discussion and questions were answered. Patient understands their activity limitations and understands rational for treatment progression. Subjective:  Pt arrives to tx session reporting some pain in her back and states that she is a bit sore from performing HEP. Any changes in Ambulatory Summary Sheet?   None        Objective:  See eval   COVID screening questions were asked and patient attested that there had been no contact or symptoms    Hesitant to perform balance activities at first     Per pt; she struggles with heights and does not like to be next to windows  Exercises: (No more than 4 columns)   Exercise/Equipment 12/7/21 #1 12/16/21 #2 Date           WARM UP      Nu Step     L3 5'          TABLE      *SLR  2x10 ea LE    *Supine Clamshell  2x10    *Supine March  2x10    *Adductor Squeeze  2x10     Bridging   x10                      STANDING      STS  2x5                                             PROPRIOCEPTION      Close Stance  30\" x2     Staggered Stance  30\" x2 ea    Wobble Board   30\" x2 ea dir    Air-ex standing   30\" x2          MODALITIES                      Other Therapeutic Activities/Education:  Patient received education on their current pathology and how their condition effects them with their functional activities. Patient understood discussion and questions were answered. Patient understands their activity limitations and understands rational for treatment progression. Home Exercise Program:  HO issued, reviewed and discussed with patient. Pt agreed to comply. Manual Treatments:  --      Modalities:  --      Communication with other providers:  eval sent 12/7/21       Assessment:  Pt tolerates tx session well without adverse reactions or complications. Initially hesitant to perform balance activities; Requires single UE support during all balance activities. Pt will continue to benefit from PT services with BLE strengthening, gait training, endurance, balance activities involving airex foam, EO/EC, and varied stances to return to PLOF. Pt is 68year old male with gradual return of balance deficits within the last year. She is on a series of cancer medications that have some SE of dizziness (approx. 18 months ago according to pt). She has used a rollator for several years, but states she is unable to stand on one leg anymore and has frequent LOB, but no falls in the last 6 months. Pt demo deficits this date that include high fall risk according to [facility] balance assessment, decreased BLE strength, dizziness with transitions involving body/head turns, decreased functional transfers, reduced endurance, gait deficits. Due to patient complaints about ongoing dizziness/room spinning, had other physical therapist, Jorge Brito, check patient for BPPV, gaze evoke, etc. since patient has previous history of some of these diagnoses. Jorge Brito cleared patient from having any of these and states patient will likely benefit from standing balance activities involving unstable surfaces and various stances.  Pt will benefit with PT services with BLE strengthening, gait training, endurance, balance activities involving airex foam, EO/EC, and varied stances to return to PLOF. Patient received education on their current pathology and how their condition effects them with their functional activities. Patient understood discussion and questions were answered. Patient understands their activity limitations and understands rational for treatment progression.           Plan for Next Session:        Time In / Time Out:   1000 / 6599       If BWC Please Indicate Time In/Out/Total Time  CPT Code Time in Time out Total Time                                                            Total for session             Timed Code/Total Treatment Minutes:  39' / 41'   1 TE    1 TA    1 NR      Next Progress Note due:  10th visit      Plan of Care Interventions:  [x] Therapeutic Exercise  [] Modalities:  [x] Therapeutic Activity     [] Ultrasound  [] Estim  [x] Gait Training      [] Cervical Traction [] Lumbar Traction  [x] Neuromuscular Re-education    [] Cold/hotpack [] Iontophoresis   [x] Instruction in HEP      [] Vasopneumatic   [] Dry Needling    [] Manual Therapy               [] Aquatic Therapy              Electronically signed by:  Brad Yanez PTA,  12/16/2021, 9:54 AM

## 2021-12-20 ENCOUNTER — HOSPITAL ENCOUNTER (OUTPATIENT)
Dept: PHYSICAL THERAPY | Age: 76
Setting detail: THERAPIES SERIES
Discharge: HOME | End: 2021-12-20
Payer: MEDICARE

## 2021-12-20 PROCEDURE — 97110 THERAPEUTIC EXERCISES: CPT

## 2021-12-20 PROCEDURE — 97530 THERAPEUTIC ACTIVITIES: CPT

## 2021-12-20 NOTE — FLOWSHEET NOTE
Outpatient Physical Therapy  [place]           [x] Phone: [phone]   Fax: [phone]  [place] [place]           [] Phone: [phone]   Fax: [phone]        Physical Therapy Daily Treatment Note  Date:  2021    Patient Name:  Kelly Arellano    :  1945  MRN: [id number]  Restrictions/Precautions: None  Diagnosis:   Diagnosis: unsteady gait  Date of Injury/Surgery: --  Treatment Diagnosis: Treatment Diagnosis: Balance and gait dysfunction    Insurance/Certification information: PT Insurance Information: [employer]   Referring Physician:  Referring Practitioner: Dr. Pooja Hernadez  Next Doctor Visit: --   Plan of care signed (Y/N): N, sent 21    Outcome Measure: next ABC, Cotton Balance:   Visit# / total visits:   3/10  Pain level: 6/10   Goals:     Patient goals : improve ability to get around her home  Short term goals  Time Frame for Short term goals: Pt demo I w/ HEP and symptom management  Short term goal 1: Pt reports no falls since the start of therpy and utilizes her rollator for all upright activities  Short term goal 2: Pt demo Cotton balance score >30/56 to improve fall risk  Short term goal 3: Pt demo >4/5 strength in all directions for BLE to improve tolerance to functional transfers  Short term goal 4: Pt demo 5x STS assessment <25 seconds with UE assist from a low chair  Short term goal 5: Pt demo >500 ft on the 6MWT assessment with rollator to improve tolerance to endurance activities       Summary of Evaluation:   Pt is 68year old male with gradual return of balance deficits within the last year. She is on a series of cancer medications that have some SE of dizziness (approx. 18 months ago according to pt). She has used a rollator for several years, but states she is unable to stand on one leg anymore and has frequent LOB, but no falls in the last 6 months.  Pt demo deficits this date that include high fall risk according to [facility] balance assessment, decreased BLE strength, dizziness with transitions involving body/head turns, decreased functional transfers, reduced endurance, gait deficits. Due to patient complaints about ongoing dizziness/room spinning, had other physical therapist, Judy Monae, check patient for BPPV, gaze evoke, etc. since patient has previous history of some of these diagnoses. Judy Monae cleared patient from having any of these and states patient will likely benefit from standing balance activities involving unstable surfaces and various stances. Pt will benefit with PT services with BLE strengthening, gait training, endurance, balance activities involving airex foam, EO/EC, and varied stances to return to PLOF. Patient received education on their current pathology and how their condition effects them with their functional activities. Patient understood discussion and questions were answered. Patient understands their activity limitations and understands rational for treatment progression. Subjective:  Pt reports she is not doing very well today - feels like the exercises are making her feel worse. In more pan than normal today. Any changes in Ambulatory Summary Sheet?   None        Objective:  See below    COVID screening questions were asked and patient attested that there had been no contact or symptoms    Hesitant to perform balance activities at first     Per pt; she struggles with heights and does not like to be next to windows  Exercises: (No more than 4 columns)   Exercise/Equipment 12/7/21 #1 12/16/21 #2 12/20/21  #3           WARM UP      Nu Step     L3 5' L2 x 5'         TABLE      *SLR  2x10 ea LE 1x10 R/L ea   *Supine Clamshell  2x10 1x20   *Supine March  2x10 2x10   *Adductor Squeeze  2x10 2x10    Bridging   x10 2x10                     STANDING      STS  2x5    Gait w/ cane   Instruct/practice                                      PROPRIOCEPTION      Close Stance  30\" x2     Staggered Stance  30\" x2 ea    Wobble Board   30\" x2 ea dir Air-ex standing   30\" x2    Inline stance   2x30\" R/L fwd ea   MODALITIES                      Other Therapeutic Activities/Education:  Patient received education on their current pathology and how their condition effects them with their functional activities. Patient understood discussion and questions were answered. Patient understands their activity limitations and understands rational for treatment progression. Home Exercise Program:  HO issued, reviewed and discussed with patient. Pt agreed to comply. Manual Treatments:  --      Modalities:  --      Communication with other providers:  eval sent 12/7/21       Assessment:  Pt presents with increased complaints of pain in general, believes it is the new exercise. Pt works on ambulating with a cane in the clinic today - initially has trouble with coordination, but eventually is able to coordinate arm with cane and opposite LE movement. Pt remains unable to perform step through gait. Pt continues to require single UE support with balance activities. Pt will continue to benefit from PT services with BLE strengthening, gait training, endurance, balance activities involving airex foam, EO/EC, and varied stances to return to PLOF. Pt is 68year old male with gradual return of balance deficits within the last year. She is on a series of cancer medications that have some SE of dizziness (approx. 18 months ago according to pt). She has used a rollator for several years, but states she is unable to stand on one leg anymore and has frequent LOB, but no falls in the last 6 months. Pt demo deficits this date that include high fall risk according to [facility] balance assessment, decreased BLE strength, dizziness with transitions involving body/head turns, decreased functional transfers, reduced endurance, gait deficits. Due to patient complaints about ongoing dizziness/room spinning, had other physical therapist, [facility], check patient for BPPV, gaze evoke, etc. since patient has previous history of some of these diagnoses. Maico Cintron cleared patient from having any of these and states patient will likely benefit from standing balance activities involving unstable surfaces and various stances. Pt will benefit with PT services with BLE strengthening, gait training, endurance, balance activities involving airex foam, EO/EC, and varied stances to return to PLOF. Patient received education on their current pathology and how their condition effects them with their functional activities. Patient understood discussion and questions were answered. Patient understands their activity limitations and understands rational for treatment progression.           Plan for Next Session:        Time In / Time Out:   0947/1027       If [facility] Please Indicate Time In/Out/Total Time  CPT Code Time in Time out Total Time                                                            Total for session             Timed Code/Total Treatment Minutes:  TE X 25' X 2;   TA X 15' X 1      Next Progress Note due:  10th visit      Plan of Care Interventions:  [x] Therapeutic Exercise  [] Modalities:  [x] Therapeutic Activity     [] Ultrasound  [] Estim  [x] Gait Training      [] Cervical Traction [] Lumbar Traction  [x] Neuromuscular Re-education    [] Cold/hotpack [] Iontophoresis   [x] Instruction in HEP      [] Vasopneumatic   [] Dry Needling    [] Manual Therapy               [] Aquatic Therapy              Electronically signed by:  Abdullahi Hugo II, PTA [id number]      12/20/2021, 7:21 AM

## 2021-12-23 ENCOUNTER — HOSPITAL ENCOUNTER (OUTPATIENT)
Dept: PHYSICAL THERAPY | Age: 76
Setting detail: THERAPIES SERIES
Discharge: HOME | End: 2021-12-23
Payer: MEDICARE

## 2021-12-23 PROCEDURE — 97110 THERAPEUTIC EXERCISES: CPT

## 2021-12-23 PROCEDURE — 97530 THERAPEUTIC ACTIVITIES: CPT

## 2021-12-23 PROCEDURE — 97112 NEUROMUSCULAR REEDUCATION: CPT

## 2021-12-23 NOTE — FLOWSHEET NOTE
Outpatient Physical Therapy  [place]           [x] Phone: [phone]   Fax: [phone]  [facility]           [] Phone: [phone]   Fax: [phone]        Physical Therapy Daily Treatment Note  Date:  2021    Patient Name:  Carrie Spicer    :  1945  MRN: [id number]  Restrictions/Precautions: None  Diagnosis:   Diagnosis: unsteady gait  Date of Injury/Surgery: --  Treatment Diagnosis: Treatment Diagnosis: Balance and gait dysfunction    Insurance/Certification information: PT Insurance Information: [employer] Medicare   Referring Physician:  Referring Practitioner: Dr. Rashida Schmidt  Next Doctor Visit: --   Plan of care signed (Y/N): N, sent 21    Outcome Measure: next ABC, Cotton Balance:   Visit# / total visits:   3/10  Pain level: 5/10   Goals:     Patient goals : improve ability to get around her home  Short term goals  Time Frame for Short term goals: Pt demo I w/ HEP and symptom management  Short term goal 1: Pt reports no falls since the start of therpy and utilizes her rollator for all upright activities  Short term goal 2: Pt demo Cotton balance score >30/56 to improve fall risk  Short term goal 3: Pt demo >4/5 strength in all directions for BLE to improve tolerance to functional transfers  Short term goal 4: Pt demo 5x STS assessment <25 seconds with UE assist from a low chair  Short term goal 5: Pt demo >500 ft on the 6MWT assessment with rollator to improve tolerance to endurance activities       Summary of Evaluation:   Pt is 68year old male with gradual return of balance deficits within the last year. She is on a series of cancer medications that have some SE of dizziness (approx. 18 months ago according to pt). She has used a rollator for several years, but states she is unable to stand on one leg anymore and has frequent LOB, but no falls in the last 6 months.  Pt demo deficits this date that include high fall risk according to [facility] balance assessment, decreased BLE strength, dizziness with transitions involving body/head turns, decreased functional transfers, reduced endurance, gait deficits. Due to patient complaints about ongoing dizziness/room spinning, had other physical therapist, Alma Medina, check patient for BPPV, gaze evoke, etc. since patient has previous history of some of these diagnoses. Alma Medina cleared patient from having any of these and states patient will likely benefit from standing balance activities involving unstable surfaces and various stances. Pt will benefit with PT services with BLE strengthening, gait training, endurance, balance activities involving airex foam, EO/EC, and varied stances to return to PLOF. Patient received education on their current pathology and how their condition effects them with their functional activities. Patient understood discussion and questions were answered. Patient understands their activity limitations and understands rational for treatment progression. Subjective:  Pt arrives to tx session reporting 5/10 pain in various areas; states that muscles are sore probably from working muscles for HEP that haven't been worked in a while. States that yesterday she tripped 4 times without AD around the house; encouraged to use AD around the house. Any changes in Ambulatory Summary Sheet?   None        Objective:  See below    COVID screening questions were asked and patient attested that there had been no contact or symptoms    Hesitant to perform balance activities at first     Per pt; she struggles with heights and does not like to be next to windows  Exercises: (No more than 4 columns)   Exercise/Equipment 12/7/21 #1 12/16/21 #2 12/20/21  #3 12/23/21 #4            WARM UP       Nu Step     L3 5' L2 x 5' L5 x 5'          TABLE       *SLR  2x10 ea LE 1x10 R/L ea 2x10 R/L ea   *Supine Clamshell  2x10 1x20 1x20   *Supine March  2x10 2x10 2x10   *Adductor Squeeze  2x10 2x10 2x10    Bridging   x10 2x10 2x10 STANDING       STS  2x5  2x10   Gait w/ cane   Instruct/practice                                            PROPRIOCEPTION       Close Stance  30\" x2   30\" x2  EO/EC   Staggered Stance  30\" x2 ea  30\" x2 ea   Wobble Board   30\" x2 ea dir  30\" x2 ea dir   Air-ex standing   30\" x2  30\" x2    Inline stance   2x30\" R/L fwd ea    MODALITIES                         Other Therapeutic Activities/Education:  Patient received education on their current pathology and how their condition effects them with their functional activities. Patient understood discussion and questions were answered. Patient understands their activity limitations and understands rational for treatment progression. Home Exercise Program:  HO issued, reviewed and discussed with patient. Pt agreed to comply. Manual Treatments:  --      Modalities:  --      Communication with other providers:  eval sent 12/7/21       Assessment:  Pt tolerates tx session well without adverse reactions or complications. Pt continues to require single UE support with balance activities. Pt will continue to benefit from PT services with BLE strengthening, gait training, endurance, balance activities involving airex foam, EO/EC, and varied stances to return to PLOF. Pt is 68year old male with gradual return of balance deficits within the last year. She is on a series of cancer medications that have some SE of dizziness (approx. 18 months ago according to pt). She has used a rollator for several years, but states she is unable to stand on one leg anymore and has frequent LOB, but no falls in the last 6 months. Pt demo deficits this date that include high fall risk according to [facility] balance assessment, decreased BLE strength, dizziness with transitions involving body/head turns, decreased functional transfers, reduced endurance, gait deficits. Due to patient complaints about ongoing dizziness/room spinning, had other physical therapist, Juwan Valiente, check patient for BPPV, gaze evoke, etc. since patient has previous history of some of these diagnoses. Jorge Brito cleared patient from having any of these and states patient will likely benefit from standing balance activities involving unstable surfaces and various stances. Pt will benefit with PT services with BLE strengthening, gait training, endurance, balance activities involving airex foam, EO/EC, and varied stances to return to PLOF. Patient received education on their current pathology and how their condition effects them with their functional activities. Patient understood discussion and questions were answered. Patient understands their activity limitations and understands rational for treatment progression.           Plan for Next Session:        Time In / Time Out:   1038 / 1120       If [facility] Please Indicate Time In/Out/Total Time  CPT Code Time in Time out Total Time                                                            Total for session             Timed Code/Total Treatment Minutes:  43' / 42'    1 TE    1 TA    1 NR      Next Progress Note due:  10th visit      Plan of Care Interventions:  [x] Therapeutic Exercise  [] Modalities:  [x] Therapeutic Activity     [] Ultrasound  [] Estim  [x] Gait Training      [] Cervical Traction [] Lumbar Traction  [x] Neuromuscular Re-education    [] Cold/hotpack [] Iontophoresis   [x] Instruction in HEP      [] Vasopneumatic   [] Dry Needling    [] Manual Therapy               [] Aquatic Therapy              Electronically signed by:  Isac Ch PTA    12/23/2021, 9:12 AM

## 2021-12-27 ENCOUNTER — HOSPITAL ENCOUNTER (OUTPATIENT)
Dept: PHYSICAL THERAPY | Age: 76
Setting detail: THERAPIES SERIES
Discharge: HOME | End: 2021-12-27
Payer: MEDICARE

## 2021-12-27 PROCEDURE — 97112 NEUROMUSCULAR REEDUCATION: CPT

## 2021-12-27 PROCEDURE — 97110 THERAPEUTIC EXERCISES: CPT

## 2021-12-27 PROCEDURE — 97530 THERAPEUTIC ACTIVITIES: CPT

## 2021-12-27 NOTE — FLOWSHEET NOTE
Outpatient Physical Therapy  [place]           [x] Phone: [phone]   Fax: [phone]  [facility]           [] Phone: [phone]   Fax: [phone]        Physical Therapy Daily Treatment Note  Date:  2021    Patient Name:  Carrie Spicer    :  1945  MRN: [id number]  Restrictions/Precautions: None  Diagnosis:   Diagnosis: unsteady gait  Date of Injury/Surgery: --  Treatment Diagnosis: Treatment Diagnosis: Balance and gait dysfunction    Insurance/Certification information: PT Insurance Information: [facility] [place]note 2[place] [employer]   Referring Physician:  Referring Practitioner: Dr. Rashida Schmidt  Next Doctor Visit: --   Plan of care signed (Y/N): N, sent 21    Outcome Measure: next ABC, Cotton Balance:   Visit# / total visits:   5/10  Pain level: 5/10   Goals:     Patient goals : improve ability to get around her home  Short term goals  Time Frame for Short term goals: Pt demo I w/ HEP and symptom management  Short term goal 1: Pt reports no falls since the start of therpy and utilizes her rollator for all upright activities  Short term goal 2: Pt demo Cotton balance score >30/56 to improve fall risk  Short term goal 3: Pt demo >4/5 strength in all directions for BLE to improve tolerance to functional transfers  Short term goal 4: Pt demo 5x STS assessment <25 seconds with UE assist from a low chair  Short term goal 5: Pt demo >500 ft on the 6MWT assessment with rollator to improve tolerance to endurance activities       Summary of Evaluation:   Pt is 68year old male with gradual return of balance deficits within the last year. She is on a series of cancer medications that have some SE of dizziness (approx. 18 months ago according to pt). She has used a rollator for several years, but states she is unable to stand on one leg anymore and has frequent LOB, but no falls in the last 6 months.  Pt demo deficits this date that include high fall risk according to [facility] balance assessment, decreased BLE strength, dizziness with transitions involving body/head turns, decreased functional transfers, reduced endurance, gait deficits. Due to patient complaints about ongoing dizziness/room spinning, had other physical therapist, Jono Loco, check patient for BPPV, gaze evoke, etc. since patient has previous history of some of these diagnoses. Jono Loco cleared patient from having any of these and states patient will likely benefit from standing balance activities involving unstable surfaces and various stances. Pt will benefit with PT services with BLE strengthening, gait training, endurance, balance activities involving airex foam, EO/EC, and varied stances to return to PLOF. Patient received education on their current pathology and how their condition effects them with their functional activities. Patient understood discussion and questions were answered. Patient understands their activity limitations and understands rational for treatment progression. Subjective:  Pt continues to report about 5/10 pain level all over. Pt reports her son was trying to help her down some stairs and she feels like something may have let go in her shoulder - really hurt a lot! Any changes in Ambulatory Summary Sheet?   None        Objective:  See below    COVID screening questions were asked and patient attested that there had been no contact or symptoms    Hesitant to perform balance activities at first     Per pt; she struggles with heights and does not like to be next to windows  Exercises: (No more than 4 columns)   Exercise/Equipment 12/16/21 #2 12/20/21  #3 12/23/21 #4 12/27/21  #5            WARM UP       Nu Step    L3 5' L2 x 5' L5 x 5' L2 x 5'          TABLE       *SLR 2x10 ea LE 1x10 R/L ea 2x10 R/L ea 2x10 R/L ea   *Supine Clamshell 2x10 1x20 1x20 1x20   *Supine March 2x10 2x10 2x10 2x10 R/L ea/alt   *Adductor Squeeze 2x10 2x10 2x10 1x20 3\"hold    Bridging  x10 2x10 2x10 2x10                        STANDING       STS 2x5 2x10 22\" 2x10   Gait w/ cane  Instruct/practice                                             PROPRIOCEPTION       Close Stance 30\" x2   30\" x2  EO/EC 2x30\" EO   Staggered Stance 30\" x2 ea  30\" x2 ea 30\" ea ft fwd EO   Wobble Board  30\" x2 ea dir  30\" x2 ea dir B F/S 60\" each EO   Air-ex standing  30\" x2  30\" x2     Inline stance  2x30\" R/L fwd ea  30\" ea ft fwd EO   AIREX marching    1x10 R/L ea/alt EO   MODALITIES                         Other Therapeutic Activities/Education:  Patient received education on their current pathology and how their condition effects them with their functional activities. Patient understood discussion and questions were answered. Patient understands their activity limitations and understands rational for treatment progression. Home Exercise Program:  HO issued, reviewed and discussed with patient. Pt agreed to comply. Manual Treatments:  --      Modalities:  --      Communication with other providers:  eval sent 12/7/21       Assessment:  Pt was able to demo good sit to stand technique with no UE assist, but she does continue to require single UE support with all balance activities. Pt will continue to benefit from PT services with BLE strengthening, gait training, endurance, balance activities involving airex foam, EO/EC, and varied stances to return to PLOF. Pt is 68year old male with gradual return of balance deficits within the last year. She is on a series of cancer medications that have some SE of dizziness (approx. 18 months ago according to pt). She has used a rollator for several years, but states she is unable to stand on one leg anymore and has frequent LOB, but no falls in the last 6 months. Pt demo deficits this date that include high fall risk according to [facility] balance assessment, decreased BLE strength, dizziness with transitions involving body/head turns, decreased functional transfers, reduced endurance, gait deficits. Due to patient complaints about ongoing dizziness/room spinning, had other physical therapist, Ketty Rose, check patient for BPPV, gaze evoke, etc. since patient has previous history of some of these diagnoses. Ketty Rose cleared patient from having any of these and states patient will likely benefit from standing balance activities involving unstable surfaces and various stances. Pt will benefit with PT services with BLE strengthening, gait training, endurance, balance activities involving airex foam, EO/EC, and varied stances to return to OF. Patient received education on their current pathology and how their condition effects them with their functional activities. Patient understood discussion and questions were answered. Patient understands their activity limitations and understands rational for treatment progression.         Plan for Next Session:        Time In / Time Out:   4221/4106     If [facility] Please Indicate Time In/Out/Total Time  CPT Code Time in Time out Total Time                                                            Total for session             Timed Code/Total Treatment Minutes:  TE X 15' X 1;   NR X 15' X 1;   TA X 13' X 1      Next Progress Note due:  10th visit      Plan of Care Interventions:  [x] Therapeutic Exercise  [] Modalities:  [x] Therapeutic Activity     [] Ultrasound  [] Estim  [x] Gait Training      [] Cervical Traction [] Lumbar Traction  [x] Neuromuscular Re-education    [] Cold/hotpack [] Iontophoresis   [x] Instruction in HEP      [] Vasopneumatic   [] Dry Needling    [] Manual Therapy               [] Aquatic Therapy              Electronically signed by:  Yun Darden II, PTA [id number]         12/27/2021, 7:19 AM

## 2021-12-29 ENCOUNTER — HOSPITAL ENCOUNTER (OUTPATIENT)
Dept: PHYSICAL THERAPY | Age: 76
Setting detail: THERAPIES SERIES
Discharge: HOME | End: 2021-12-29
Payer: MEDICARE

## 2021-12-29 PROCEDURE — 97112 NEUROMUSCULAR REEDUCATION: CPT

## 2021-12-29 NOTE — FLOWSHEET NOTE
Outpatient Physical Therapy  [place]           [x] Phone: [phone]   Fax: [phone]  [facility]           [] Phone: [phone]   Fax: [phone]        Physical Therapy Daily Treatment Note  Date:  2021    Patient Name:  Rhett Nur    :  1945  MRN: [id number]  Restrictions/Precautions: None  Diagnosis:   Diagnosis: unsteady gait  Date of Injury/Surgery: --  Treatment Diagnosis: Treatment Diagnosis: Balance and gait dysfunction    Insurance/Certification information: PT Insurance Information: [employer]   Referring Physician:  Referring Practitioner: Dr. Chapis Mobley  Next Doctor Visit: --   Plan of care signed (Y/N): YES, sent 21    Outcome Measure: next ABC, Cotton Balance:   Visit# / total visits:   6/10  Pain level: 5/10   Goals:     Patient goals : improve ability to get around her home  Short term goals  Time Frame for Short term goals: Pt demo I w/ HEP and symptom management  Short term goal 1: Pt reports no falls since the start of therpy and utilizes her rollator for all upright activities  Short term goal 2: Pt demo Cotton balance score >30/56 to improve fall risk  Short term goal 3: Pt demo >4/5 strength in all directions for BLE to improve tolerance to functional transfers  Short term goal 4: Pt demo 5x STS assessment <25 seconds with UE assist from a low chair  Short term goal 5: Pt demo >500 ft on the 6MWT assessment with rollator to improve tolerance to endurance activities       Summary of Evaluation:   Pt is 68year old female with gradual return of balance deficits within the last year. She is on a series of cancer medications that have some SE of dizziness (approx. 18 months ago according to pt). She has used a rollator for several years, but states she is unable to stand on one leg anymore and has frequent LOB, but no falls in the last 6 months.  Pt demo deficits this date that include high fall risk according to Adina Francois balance assessment, decreased BLE strength, dizziness with transitions involving body/head turns, decreased functional transfers, reduced endurance, gait deficits. Due to patient complaints about ongoing dizziness/room spinning, had other physical therapist, Lito Balderrama, check patient for BPPV, gaze evoke, etc. since patient has previous history of some of these diagnoses. Lito Balderrama cleared patient from having any of these and states patient will likely benefit from standing balance activities involving unstable surfaces and various stances. Pt will benefit with PT services with BLE strengthening, gait training, endurance, balance activities involving airex foam, EO/EC, and varied stances to return to PLOF. Patient received education on their current pathology and how their condition effects them with their functional activities. Patient understood discussion and questions were answered. Patient understands their activity limitations and understands rational for treatment progression. Subjective:  Pt reports she has been achy and shaky today. She feels she has made improvements and doesn't feel as dizziness. She doesn't use the walker much int he house, but uses it to walk into [facility]. She still feels bad about her balance. Any changes in Ambulatory Summary Sheet?   None      Objective:  See below    COVID screening questions were asked and patient attested that there had been no contact or symptom    Noted patient discomfort with walking without AD even though she says she does it at home; completed HHA to increase confidence        Per pt; she struggles with heights and does not like to be next to windows and facing the mirror  Exercises: (No more than 4 columns)   Exercise/Equipment 12/23/21 #4 12/27/21  #5 12/29/21 #6           WARM UP      Nu Step    L5 x 5' L2 x 5' L2 5'         TABLE      *SLR 2x10 R/L ea 2x10 R/L ea    *Supine Clamshell 1x20 1x20    *Supine March 2x10 2x10 R/L ea/alt    *Adductor Squeeze 2x10 1x20 3\"hold     Bridging  2x10 2x10 STANDING      STS 2x10 22\" 2x10 2x10 22\"   Gait Training    165 ft w/ x2 handheld assist from therapist; no AD   Fwd Step Taps           PROPRIOCEPTION      Close Stance 30\" x2  EO/EC 2x30\" EO 30\" x1 EO    30\" airex EO    30\" airex EC    No UE assist   Staggered Stance 30\" x2 ea 30\" ea ft fwd EO 30\" x2 ea side airex, no UE assist   Wobble Board  30\" x2 ea dir B F/S 60\" each EO --   Inline stance  30\" ea ft fwd EO --   AIREX marching  1x10 R/L ea/alt EO 2x20 alt airex w/o UE assist                     MODALITIES                      Other Therapeutic Activities/Education:  Patient received education on their current pathology and how their condition effects them with their functional activities. Patient understood discussion and questions were answered. Patient understands their activity limitations and understands rational for treatment progression. Home Exercise Program:  HO issued, reviewed and discussed with patient. Pt agreed to comply. Manual Treatments:  --      Modalities:  --      Communication with other providers:  danyelle sent 12/7/21       Assessment:  Patient demonstrates fair tolerance to today's session. She lacks confidence in her balance abilities and often needs frequent encouragement from therapist that she is capable of doing an activity. Attempted 6MWT without AD, but patient felt she needed something to hold onto. End of session:     Pt is 68year old male with gradual return of balance deficits within the last year. She is on a series of cancer medications that have some SE of dizziness (approx. 18 months ago according to pt). She has used a rollator for several years, but states she is unable to stand on one leg anymore and has frequent LOB, but no falls in the last 6 months.  Pt demo deficits this date that include high fall risk according to Shaunaine Kerbs balance assessment, decreased BLE strength, dizziness with transitions involving body/head turns, decreased

## 2022-01-03 ENCOUNTER — HOSPITAL ENCOUNTER (OUTPATIENT)
Dept: PHYSICAL THERAPY | Age: 77
Discharge: HOME | End: 2022-01-03

## 2022-01-03 NOTE — FLOWSHEET NOTE
Physical Therapy  Cancellation/No-show Note  Patient Name:  Junior Puri  :  1945   Date:  1/3/2022  Cancelled visits to date: 1  No-shows to date: 0    For today's appointment patient:  [x]  Cancelled  []  Rescheduled appointment  []  No-show     Reason given by patient:  [x]  Patient ill  []  Conflicting appointment  []  No transportation    []  Conflict with work  []  No reason given  []  Other:     Comments:   Covid symptoms     Electronically signed by:  Mery Watson PTA,

## 2022-01-24 ENCOUNTER — HOSPITAL ENCOUNTER (OUTPATIENT)
Dept: PHYSICAL THERAPY | Age: 77
Discharge: HOME | End: 2022-01-24
Payer: MEDICARE

## 2022-01-24 PROCEDURE — 97110 THERAPEUTIC EXERCISES: CPT

## 2022-01-24 PROCEDURE — 97112 NEUROMUSCULAR REEDUCATION: CPT

## 2022-01-24 NOTE — FLOWSHEET NOTE
Outpatient Physical Therapy  [place]           [x] Phone: [phone]   Fax: [phone]  [facility]           [] Phone: [phone]   Fax: [phone]        Physical Therapy Daily Treatment Note  Date:  2022    Patient Name:  Suresh Suazo    :  1945  MRN: [id number]  Restrictions/Precautions: None  Diagnosis:   Diagnosis: unsteady gait  Date of Injury/Surgery: --  Treatment Diagnosis: Treatment Diagnosis: Balance and gait dysfunction    Insurance/Certification information: PT Insurance Information: [facility]icare   Referring Physician:  Referring Practitioner: Dr. Garrick Espinosa  Next Doctor Visit: --   Plan of care signed (Y/N): YES, sent 21    Outcome Measure: lexx ABC, Cotton Balance:   Visit# / total visits:   7/10  Pain level: 0/10   Goals:     Patient goals : improve ability to get around her home  Short term goals  Time Frame for Short term goals: Pt demo I w/ HEP and symptom management  Short term goal 1: Pt reports no falls since the start of therpy and utilizes her rollator for all upright activities  Short term goal 2: Pt demo Cotton balance score >30/56 to improve fall risk  Short term goal 3: Pt demo >4/5 strength in all directions for BLE to improve tolerance to functional transfers  Short term goal 4: Pt demo 5x STS assessment <25 seconds with UE assist from a low chair  Short term goal 5: Pt demo >500 ft on the 6MWT assessment with rollator to improve tolerance to endurance activities       Summary of Evaluation:   Pt is 68year old female with gradual return of balance deficits within the last year. She is on a series of cancer medications that have some SE of dizziness (approx. 18 months ago according to pt). She has used a rollator for several years, but states she is unable to stand on one leg anymore and has frequent LOB, but no falls in the last 6 months.  Pt demo deficits this date that include high fall risk according to Tariq Reyna balance assessment, decreased BLE strength, dizziness with transitions involving body/head turns, decreased functional transfers, reduced endurance, gait deficits. Due to patient complaints about ongoing dizziness/room spinning, had other physical therapist, Gerson Showers, check patient for BPPV, gaze evoke, etc. since patient has previous history of some of these diagnoses. Gerson Showers cleared patient from having any of these and states patient will likely benefit from standing balance activities involving unstable surfaces and various stances. Pt will benefit with PT services with BLE strengthening, gait training, endurance, balance activities involving airex foam, EO/EC, and varied stances to return to PLOF. Patient received education on their current pathology and how their condition effects them with their functional activities. Patient understood discussion and questions were answered. Patient understands their activity limitations and understands rational for treatment progression. Subjective:  Pt arrives to tx session reporting 0/10 pain. States that she hasn't been doing her HEP. Any changes in Ambulatory Summary Sheet?   None      Objective:  See below    COVID screening questions were asked and patient attested that there had been no contact or symptom        Per pt; she struggles with heights and does not like to be next to windows and facing the mirror  Exercises: (No more than 4 columns)   Exercise/Equipment 12/23/21 #4 12/27/21  #5 12/29/21 #6 1/24/22 #7            WARM UP       Nu Step    L5 x 5' L2 x 5' L2 5' L3 5'          TABLE       *SLR 2x10 R/L ea 2x10 R/L ea  2x10 R/L ea   *Supine Clamshell 1x20 1x20  1x20   *Supine March 2x10 2x10 R/L ea/alt  2x10 R/L ea/alt   *Adductor Squeeze 2x10 1x20 3\"hold  x20 3\"hold    Bridging  2x10 2x10                          STANDING       STS 2x10 22\" 2x10 2x10 22\" 2x10 22\"   Gait Training    165 ft w/ x2 handheld assist from therapist; no AD    Fwd Step Taps            PROPRIOCEPTION       Close Stance 30\" x2  EO/EC 2x30\" EO 30\" x1 EO    30\" airex EO    30\" airex EC    No UE assist 30\" x1 EO    30\" airex EO    30\" airex EC   Staggered Stance 30\" x2 ea 30\" ea ft fwd EO 30\" x2 ea side airex, no UE assist 30\" x2 ea side airex, no UE assist   Wobble Board  30\" x2 ea dir B F/S 60\" each EO --    Inline stance  30\" ea ft fwd EO --    AIREX marching  1x10 R/L ea/alt EO 2x20 alt airex w/o UE assist 2x20 alt airex w/o UE assist                        MODALITIES                         Other Therapeutic Activities/Education:  Patient received education on their current pathology and how their condition effects them with their functional activities. Patient understood discussion and questions were answered. Patient understands their activity limitations and understands rational for treatment progression. Home Exercise Program:  HO issued, reviewed and discussed with patient. Pt agreed to comply. Manual Treatments:  --      Modalities:  --      Communication with other providers:  danyelle sent 12/7/21       Assessment:  Pt tolerated tx session well without adverse reactions or complications. Pt states that she hasn't been doing her HEP like she was; does feel that she has gotten better overall since starting therapy. Noted some improvement in balance this date with EO, still struggles with EC balance activities. Pt will benefit with PT services with BLE strengthening, gait training, endurance, balance activities involving airex foam, EO/EC, and varied stances to return to PLOF. End of session:     Pt is 68year old male with gradual return of balance deficits within the last year. She is on a series of cancer medications that have some SE of dizziness (approx. 18 months ago according to pt). She has used a rollator for several years, but states she is unable to stand on one leg anymore and has frequent LOB, but no falls in the last 6 months.  Pt demo deficits this date that include high fall risk according to Wendy balance assessment, decreased BLE strength, dizziness with transitions involving body/head turns, decreased functional transfers, reduced endurance, gait deficits. Due to patient complaints about ongoing dizziness/room spinning, had other physical therapist, Javy Mckeon, check patient for BPPV, gaze evoke, etc. since patient has previous history of some of these diagnoses. Javy Mckeon cleared patient from having any of these and states patient will likely benefit from standing balance activities involving unstable surfaces and various stances. Pt will benefit with PT services with BLE strengthening, gait training, endurance, balance activities involving airex foam, EO/EC, and varied stances to return to PLOF. Patient received education on their current pathology and how their condition effects them with their functional activities. Patient understood discussion and questions were answered. Patient understands their activity limitations and understands rational for treatment progression.         Plan for Next Session:        Time In / Time Out:   0335 / 1635     If [facility] Please Indicate Time In/Out/Total Time  CPT Code Time in Time out Total Time                                                            Total for session             Timed Code/Total Treatment Minutes:  45'   2 NR   1 TE      Next Progress Note due:  10th visit      Plan of Care Interventions:  [x] Therapeutic Exercise  [] Modalities:  [x] Therapeutic Activity     [] Ultrasound  [] Estim  [x] Gait Training      [] Cervical Traction [] Lumbar Traction  [x] Neuromuscular Re-education    [] Cold/hotpack [] Iontophoresis   [x] Instruction in HEP      [] Vasopneumatic   [] Dry Needling    [] Manual Therapy               [] Aquatic Therapy              Electronically signed by:  Suri Luna PTA,     1/24/2022, 9:48 AM

## 2022-02-07 RX ORDER — POTASSIUM CHLORIDE 20 MEQ/1
TABLET, EXTENDED RELEASE ORAL
Qty: 30 TABLET | Refills: 5 | Status: SHIPPED | OUTPATIENT
Start: 2022-02-07

## 2022-03-14 NOTE — DISCHARGE SUMMARY
Outpatient Physical Therapy           [place]           [] Phone: [phone]   Fax: [phone]  [place]           [] Phone: [phone]   Fax: [phone]     To: Dr. Patti Ayala    From: Kinsey Cannon PT     Patient: Fernanda Cardenas    : 1945  Diagnosis: unsteady gait   Treatment Diagnosis: balance and gait dysfunction   Date: 3/14/2022    Physical Therapy Discharge Form  Dear Dr. Patti Ayala,  The following patient has not returned to therapy in >30 days and will be formally discharged at this time. Please contact the office if there are any questions or concerns. Electronically signed by:  Kinsey Cannon PT, DPT, [facility] 3/14/2022, 3:15 PM        If you have any questions or concerns, please don't hesitate to call.   Thank you for your referral.      Physician Signature:________________________________Date:_________ TIME: _____  By signing above, therapists plan is approved by physician

## 2022-04-20 ENCOUNTER — HOSPITAL ENCOUNTER (OUTPATIENT)
Dept: INFUSION THERAPY | Age: 77
Discharge: HOME | End: 2022-04-20
Payer: MEDICARE

## 2022-04-20 DIAGNOSIS — Z12.31 SCREENING MAMMOGRAM FOR HIGH-RISK PATIENT: ICD-10-CM

## 2022-04-20 DIAGNOSIS — C50.411 MALIGNANT NEOPLASM OF UPPER-OUTER QUADRANT OF RIGHT BREAST IN FEMALE, ESTROGEN RECEPTOR POSITIVE (HCC): ICD-10-CM

## 2022-04-20 DIAGNOSIS — Z17.0 MALIGNANT NEOPLASM OF UPPER-OUTER QUADRANT OF RIGHT BREAST IN FEMALE, ESTROGEN RECEPTOR POSITIVE (HCC): ICD-10-CM

## 2022-04-20 LAB
ALBUMIN SERPL-MCNC: 4.6 GM/DL (ref 3.4–5)
ALP BLD-CCNC: 81 IU/L (ref 40–129)
ALT SERPL-CCNC: 20 U/L (ref 10–40)
ANION GAP SERPL CALCULATED.3IONS-SCNC: 9 MMOL/L (ref 4–16)
AST SERPL-CCNC: 21 IU/L (ref 15–37)
BASOPHILS ABSOLUTE: 0 K/CU MM
BASOPHILS RELATIVE PERCENT: 0.2 % (ref 0–1)
BILIRUB SERPL-MCNC: 0.3 MG/DL (ref 0–1)
BUN BLDV-MCNC: 18 MG/DL (ref 6–23)
CALCIUM SERPL-MCNC: 10 MG/DL (ref 8.3–10.6)
CHLORIDE BLD-SCNC: 104 MMOL/L (ref 99–110)
CO2: 28 MMOL/L (ref 21–32)
CREAT SERPL-MCNC: 0.8 MG/DL (ref 0.6–1.1)
DIFFERENTIAL TYPE: ABNORMAL
EOSINOPHILS ABSOLUTE: 0.1 K/CU MM
EOSINOPHILS RELATIVE PERCENT: 1.1 % (ref 0–3)
GFR AFRICAN AMERICAN: >60 ML/MIN/1.73M2
GFR NON-AFRICAN AMERICAN: >60 ML/MIN/1.73M2
GLUCOSE BLD-MCNC: 96 MG/DL (ref 70–99)
HCT VFR BLD CALC: 42 % (ref 37–47)
HEMOGLOBIN: 14 GM/DL (ref 12.5–16)
LYMPHOCYTES ABSOLUTE: 1.3 K/CU MM
LYMPHOCYTES RELATIVE PERCENT: 23 % (ref 24–44)
MCH RBC QN AUTO: 34.8 PG (ref 27–31)
MCHC RBC AUTO-ENTMCNC: 33.3 % (ref 32–36)
MCV RBC AUTO: 104.5 FL (ref 78–100)
MONOCYTES ABSOLUTE: 0.6 K/CU MM
MONOCYTES RELATIVE PERCENT: 9.7 % (ref 0–4)
PDW BLD-RTO: 12.7 % (ref 11.7–14.9)
PLATELET # BLD: 212 K/CU MM (ref 140–440)
PMV BLD AUTO: 9.7 FL (ref 7.5–11.1)
POTASSIUM SERPL-SCNC: 3.8 MMOL/L (ref 3.5–5.1)
RBC # BLD: 4.02 M/CU MM (ref 4.2–5.4)
SEGMENTED NEUTROPHILS ABSOLUTE COUNT: 3.8 K/CU MM
SEGMENTED NEUTROPHILS RELATIVE PERCENT: 66 % (ref 36–66)
SODIUM BLD-SCNC: 141 MMOL/L (ref 135–145)
TOTAL PROTEIN: 6.7 GM/DL (ref 6.4–8.2)
WBC # BLD: 5.7 K/CU MM (ref 4–10.5)

## 2022-04-20 PROCEDURE — 85025 COMPLETE CBC W/AUTO DIFF WBC: CPT

## 2022-04-20 PROCEDURE — 36415 COLL VENOUS BLD VENIPUNCTURE: CPT

## 2022-04-20 PROCEDURE — 80053 COMPREHEN METABOLIC PANEL: CPT

## 2022-04-20 PROCEDURE — 86300 IMMUNOASSAY TUMOR CA 15-3: CPT

## 2022-04-23 LAB — CA 27.29: 14.5 U/ML

## 2022-04-27 ENCOUNTER — OFFICE VISIT (OUTPATIENT)
Dept: ONCOLOGY | Age: 77
End: 2022-04-27
Payer: MEDICARE

## 2022-04-27 ENCOUNTER — HOSPITAL ENCOUNTER (OUTPATIENT)
Dept: INFUSION THERAPY | Age: 77
Discharge: HOME | End: 2022-04-27

## 2022-04-27 VITALS
WEIGHT: 209 LBS | RESPIRATION RATE: 18 BRPM | OXYGEN SATURATION: 95 % | HEART RATE: 65 BPM | TEMPERATURE: 97.9 F | DIASTOLIC BLOOD PRESSURE: 67 MMHG | SYSTOLIC BLOOD PRESSURE: 152 MMHG | HEIGHT: 65 IN | BODY MASS INDEX: 34.82 KG/M2

## 2022-04-27 DIAGNOSIS — C50.911 CHEST WALL RECURRENCE OF BREAST CANCER, RIGHT (HCC): ICD-10-CM

## 2022-04-27 DIAGNOSIS — R42 DIZZINESS: ICD-10-CM

## 2022-04-27 DIAGNOSIS — M85.80 OSTEOPENIA, UNSPECIFIED LOCATION: ICD-10-CM

## 2022-04-27 DIAGNOSIS — K14.8 TONGUE LESION: ICD-10-CM

## 2022-04-27 DIAGNOSIS — Z17.0 MALIGNANT NEOPLASM OF UPPER-OUTER QUADRANT OF RIGHT BREAST IN FEMALE, ESTROGEN RECEPTOR POSITIVE (HCC): Primary | ICD-10-CM

## 2022-04-27 DIAGNOSIS — C79.89 CHEST WALL RECURRENCE OF BREAST CANCER, RIGHT (HCC): ICD-10-CM

## 2022-04-27 DIAGNOSIS — C50.411 MALIGNANT NEOPLASM OF UPPER-OUTER QUADRANT OF RIGHT BREAST IN FEMALE, ESTROGEN RECEPTOR POSITIVE (HCC): Primary | ICD-10-CM

## 2022-04-27 DIAGNOSIS — H92.02 LEFT EAR PAIN: ICD-10-CM

## 2022-04-27 DIAGNOSIS — R19.7 DIARRHEA, UNSPECIFIED TYPE: ICD-10-CM

## 2022-04-27 DIAGNOSIS — R60.0 LOWER EXTREMITY EDEMA: ICD-10-CM

## 2022-04-27 PROCEDURE — 99214 OFFICE O/P EST MOD 30 MIN: CPT | Performed by: INTERNAL MEDICINE

## 2022-04-27 ASSESSMENT — PATIENT HEALTH QUESTIONNAIRE - PHQ9
2. FEELING DOWN, DEPRESSED OR HOPELESS: 0
SUM OF ALL RESPONSES TO PHQ QUESTIONS 1-9: 0
1. LITTLE INTEREST OR PLEASURE IN DOING THINGS: 0
SUM OF ALL RESPONSES TO PHQ QUESTIONS 1-9: 0
SUM OF ALL RESPONSES TO PHQ QUESTIONS 1-9: 0
SUM OF ALL RESPONSES TO PHQ9 QUESTIONS 1 & 2: 0
SUM OF ALL RESPONSES TO PHQ QUESTIONS 1-9: 0

## 2022-04-27 NOTE — PROGRESS NOTES
MA Rooming Questions  Patient: Kate Zuleta  MRN: [id number]    Date: 4/27/2022        1. Do you have any new issues? yes - diarrhea since this AM, feet swollen-about a week, Dizzy spells, issues with hearing         2. Do you need any refills on medications?    no    3. Have you had any imaging done since your last visit? yes -     4. Have you been hospitalized or seen in the emergency room since your last visit here?   no    5. Did the patient have a depression screening completed today?  Yes    No data recorded     PHQ-9 Given to (if applicable):               PHQ-9 Score (if applicable):                     [] Positive     []  Negative              Does question #9 need addressed (if applicable)                     [] Yes    []  No               Claude Palma, MARIA ELENA

## 2022-04-27 NOTE — PROGRESS NOTES
Nilson Jesus Name: Deena Walker  Patient : 1945  Patient MRN: [id number]     Primary Oncologist: Aly Regan MD  PCP:Dr Marleny Ramos  Surgeon Dr Alo Gates     Date of Service: 22    Chief Complaint:   Chief Complaint   Patient presents with    Follow-up        Active Problem list   Breast cancer    HPI  1. Mrs. Tonio Huffman ( 45) has a history of right breast cancer, for which she had modified radical mastectomy in . She had a couple of local recurrences, treated with chemotherapy and radiation therapy and excision of the chest wall recurrences. 2. In  she had a total abdominal hysterectomy/bilateral salpingo-oophorectomy, which was done as part of hormonal therapy and Tamoxifen for one year. After that she did not show any evidence of recurrence. 3. In  she presented with right axillary mass which was biopsied and confirmed to be an invasive carcinoma, morphologically consistent with origin from her breast primary. Her ER was positive 90 percent, [place] positive. HER-2/naomi was negative by FISH. 4. All other w/u including CT of the chest, abdomen and pelvis showed no evidence of any intraabdominal mass or metastases. Bone scan was negative for any bony mets. Her MUGA scan showed 61 percent ejection fraction. 5. She was started on Taxol d1&8 [id number] for 6 courses starting in Dec of 2012 till 2013 and showed good partial response with mass size reduced to 2.4 cm in  from 3 cm in 2012 and completed 6 courses in 2013. Right Ax mass completely resolved over next couple years. 6. In May 2013 she was initiated on Hormonal therapy with Arimidex 1 mg daily for maintenance treatment. 7. In continued Clinical remission. 2017 Hemoglobin 14.3, hematocrit 42, white count 5,900, platelet 700,527, BUN 17, creatinine 0.7. Sodium was 141, potassium 3.4, chloride 95, calcium 10.0. The rest of the chemistries are normal. LFTs normal, , uric acid 6.0.  CEA 1.6, CA 27 15.     11-17 DEXA scan showed osteopenia, CT chest abdomen pelvis showed no evidence of metastatic disease, fatty liver, right renal nonobstructing parapelvic cyst, diverticulosis. Nuclear med bone scan showed no evidence to suggest osseous metastatic disease. June 2018 Yamilka Narayan started but never got more than 1  July 2018 Left breast with no mammographic evidence of malignancy  july 2019 Yamilka Narayan restarted but got only one dose again  9-2019 stopped anastrazole for bone pain/arthritic pains, which got better, then placed on letrozole but made her feel bad so she stopped it. 10-19 restarted anastrazole   Feb 2020 stopped AI  Feb 2020 Mammogram normal BIRADS cat 2  June 2020 restarted taking anastrazole. 7/9/2020 limited right breast axillary ultrasound revealed highly suspicious right breast 9:00 mass correlating to the palpable complaint. 7/14/2020 right breast area mass biopsy revealed invasive ductal carcinoma grade 2. ER greater than 95% positive. [place] negative. HER-2 by IHC negative HER-2 by FISH pending. 7/16/2026 PET scan revealed metabolically active nodule along the inferior lateral margin of the right mastectomy site concerning for recurrent disease. This has slowly enlarged since 11/21/2017. No other metabolically active disease. 8/7/20: underwent excision with skin flap. Tumor size was 3cm, Grade 2, DCIS/LCIS not present. ER positive >95%, WV negative and her 2 neg by IHC and FISH. Margins <1mm     No recs for adjuvant XRT per Dr Marni Mary. Resumed AI September 2020 7/22/2021 left screening mammogram.  No mammographic evidence of malignancy. Status post removal of the left chest wall port. PAST MEDICAL HISTORY:   1. Hypertension since 1980s.,  2. Asthma since 2008.,  3. Allergies to mold, animals, dust, et Burnice Dominguez, seem to bother her ,  4. Arthritis since early 2000 involving her knee and neck. ,  5.  Colonoscopy done in 2011 with biopsy of transverse colon showed benign mucosal lymphoid aggregates and a tubular adenoma from the ascending colon. The rest was negative. (Done by Dr. Payton Germain.) ,  6. CAT scan of the chest, which showed a 3 cm anterior chest wall nodule unchanged from 2011, also present in 2009, when it was 2.5 cm.,  7. Chest x-ray: 2015 No acute cardiopulmonary findings. ,  8. Mammogram of the left breast was negative for any lesions. PAST SURGICAL HISTORY:   1. History of breast cancer as listed above. 2. Squamous cell cancer removed from the leg in . 3. Cholecystectomy in . 4. Heel spur surgery .,  5. Right Total knee replacement in 2011.  6. Lesion removed from her left arm in . 7. Joint replacement in her thumb in May of 2012. 8. Cystic lesions removed by Dr Gudelia Keith from her finger Sep 2014. 9. ear surgery 2015     FAMILY HISTORY:   Both father and mother had coronary artery disease. There is also a history of cancer of the colon and liver in the family. Sister had hepatitis C.     SOCIAL HISTORY:   She has never smoked. She is a nondrinker. She is  since . Her   of a heart attack. 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 Interval History   22: Arrived alone to the clinic today. Diarrhea today. Feels dizzy today. Feet are swollen. Compression devise not helping, Ear full and back hurts.  No  sx.   Review of Systems  Per interval history; otherwise 10 point ROS is negative      Vital Signs:  BP (!) 152/67 (Site: Left Upper Arm, Position: Sitting, Cuff Size: Large Adult)   Pulse 65   Temp 97.9 °F (36.6 °C) (Temporal)   Resp 18   Ht 5' 5\" (1.651 m)   Wt 209 lb (94.8 kg)   SpO2 95%   BMI 34.78 kg/m²     Physical Exam:      CONSTITUTIONAL: awake, alert, obese, ambulates with a walker   EYES: mahsa, no palor or any icetrus   ENT: ATNC   NECK: no JVD   HEMATOLOGIC/LYMPHATIC: no cervical, supraclavicular or axillary lymphadenopathy   LUNGS: ctab  Breast; Rt mastectomy. Right axillary scar very well healed, no pain, no discharge.   Left breast normal  CARDIOVASCULAR: s1s2 rrr no murmurs   ABDOMEN: soft ntnd bs pos  NEUROLOGIC:GI   SKIN: No rash   EXTREMITIES:Bilateral LE edema 2-3+      Labs:    Hematology:  Lab Results   Component Value Date    WBC 5.7 04/20/2022    RBC 4.02 (L) 04/20/2022    HGB 14.0 04/20/2022    HCT 42.0 04/20/2022    .5 (H) 04/20/2022    MCH 34.8 (H) 04/20/2022    MCHC 33.3 04/20/2022    RDW 12.7 04/20/2022     04/20/2022    MPV 9.7 04/20/2022    BANDSPCT 2 (L) 11/21/2017    SEGSPCT 66.0 04/20/2022    EOSRELPCT 1.1 04/20/2022    BASOPCT 0.2 04/20/2022    LYMPHOPCT 23.0 (L) 04/20/2022    MONOPCT 9.7 (H) 04/20/2022    BANDABS 0.10 11/21/2017    SEGSABS 3.8 04/20/2022    EOSABS 0.1 04/20/2022    BASOSABS 0.0 04/20/2022    LYMPHSABS 1.3 04/20/2022    MONOSABS 0.6 04/20/2022    DIFFTYPE AUTOMATED DIFFERENTIAL 04/20/2022    POLYCHROM 1+ 12/12/2016    PLTM FEW 12/12/2016     Lab Results   Component Value Date    ESR 12 03/08/2017       Chemistry:  Lab Results   Component Value Date     04/20/2022    K 3.8 04/20/2022     04/20/2022    CO2 28 04/20/2022    BUN 18 04/20/2022    CREATININE 0.8 04/20/2022    GLUCOSE 96 04/20/2022    CALCIUM 10.0 04/20/2022    PROT 6.7 04/20/2022    LABALBU 4.6 04/20/2022    BILITOT 0.3 04/20/2022    ALKPHOS 81 04/20/2022    AST 21 04/20/2022    ALT 20 04/20/2022    LABGLOM >60 04/20/2022    GFRAA >60 04/20/2022    PHOS 1.9 (L) 09/09/2019    MG 2.0 12/14/2020    POCGLU 90 12/14/2020     Lab Results   Component Value Date     10/17/2017     No components found for: LD  Lab Results   Component Value Date    TSHHS 2.780 12/15/2020    T4FREE 1.32 12/15/2020    FT3 3.2 04/27/2017       Immunology:  Lab Results   Component Value Date    PROT 6.7 04/20/2022     No results found for: SHAY Meyers  No results found for: B2M    Coagulation Panel:  Lab Results   Component Value Date    PROTIME 10.9 (L) 12/14/2020    INR 0.90 12/14/2020    APTT 23.8 04/27/2017    DDIMER 919 (H) 12/12/2016       Anemia Panel:  Lab Results   Component Value Date    JTRLVXHE89 579.2 12/15/2020    FOLATE 19.1 (H) 12/15/2020       Tumor Markers:  Lab Results   Component Value Date    CEA 2.1 01/22/2019    LABCA2 14.5 04/20/2022         Imaging: Reviewed     Pathology:Reviewed     Observations:  Performance Status: ECOG 2   Depression Status: PHQ 9 neg       Assessment & Plan:  1. Ca Breast 1972 with Right Ax Recurrence Nov 2012   Initial diagnosis in 1972 when she had right modified radical mastectomy. She had isolated local recurrences in the early eighties, for which she had excision, followed by radiation therapy and hormonal therapy. After being off treatment for many years, she had another recurrence in November of 2012 and after excision, she had minimal residual disease and has been on maintenance Arimidex for five years after initial chemotherapy with a taxane. Dr Roger Magana recommendation was to keep her on hormonal therapy for at least 10 years or even longer. Has been off of AI intermittently for bone pain, last time since feb 2020. Resumed Luma 15 2020   PET with avid right axillary mass, biopsy-proven invasive ductal carcinoma MO ER positive MO negative and HER-2 negative. No other metastatic disease. S/P surgical resection  No recs for any advunat XRT  Plan to Continue AI and monitor for AE. Dexa scan 11/2017 with osteopenia. Recommend continuation of calcium and vitamin D. Repeat dexa scan October 2020 with osteopenia in the hips, repeat in 3 years   Mammogram left 7/22/2021 normal, recommend repeat in 1 year  April 2022 , CA 27-29, CBC and CMP within normal limits    LE edema/lymphedema:Compression devise not helping for the last 6 weeks. Is on diuretics, may need increased but advised her to follow with Dr Tono Murillo who usually follows up on that.      Macrocytosis without anemia:

## 2022-07-20 ENCOUNTER — HOSPITAL ENCOUNTER (OUTPATIENT)
Dept: INFUSION THERAPY | Age: 77
Discharge: HOME | End: 2022-07-20
Payer: MEDICARE

## 2022-07-20 DIAGNOSIS — Z17.0 MALIGNANT NEOPLASM OF UPPER-OUTER QUADRANT OF RIGHT BREAST IN FEMALE, ESTROGEN RECEPTOR POSITIVE (HCC): ICD-10-CM

## 2022-07-20 DIAGNOSIS — C50.911 CHEST WALL RECURRENCE OF BREAST CANCER, RIGHT (HCC): ICD-10-CM

## 2022-07-20 DIAGNOSIS — C79.89 CHEST WALL RECURRENCE OF BREAST CANCER, RIGHT (HCC): ICD-10-CM

## 2022-07-20 DIAGNOSIS — C50.411 MALIGNANT NEOPLASM OF UPPER-OUTER QUADRANT OF RIGHT BREAST IN FEMALE, ESTROGEN RECEPTOR POSITIVE (HCC): ICD-10-CM

## 2022-07-20 LAB
ALBUMIN SERPL-MCNC: 4.6 GM/DL (ref 3.4–5)
ALP BLD-CCNC: 83 IU/L (ref 40–128)
ALT SERPL-CCNC: 19 U/L (ref 10–40)
ANION GAP SERPL CALCULATED.3IONS-SCNC: 9 MMOL/L (ref 4–16)
AST SERPL-CCNC: 18 IU/L (ref 15–37)
BASOPHILS ABSOLUTE: 0 K/CU MM
BASOPHILS RELATIVE PERCENT: 0.3 % (ref 0–1)
BILIRUB SERPL-MCNC: 0.3 MG/DL (ref 0–1)
BUN BLDV-MCNC: 17 MG/DL (ref 6–23)
CALCIUM SERPL-MCNC: 10 MG/DL (ref 8.3–10.6)
CHLORIDE BLD-SCNC: 107 MMOL/L (ref 99–110)
CO2: 28 MMOL/L (ref 21–32)
CREAT SERPL-MCNC: 0.6 MG/DL (ref 0.6–1.1)
DIFFERENTIAL TYPE: ABNORMAL
EOSINOPHILS ABSOLUTE: 0.1 K/CU MM
EOSINOPHILS RELATIVE PERCENT: 1.7 % (ref 0–3)
GFR AFRICAN AMERICAN: >60 ML/MIN/1.73M2
GFR NON-AFRICAN AMERICAN: >60 ML/MIN/1.73M2
GLUCOSE BLD-MCNC: 108 MG/DL (ref 70–99)
HCT VFR BLD CALC: 42 % (ref 37–47)
HEMOGLOBIN: 14.1 GM/DL (ref 12.5–16)
LYMPHOCYTES ABSOLUTE: 1.3 K/CU MM
LYMPHOCYTES RELATIVE PERCENT: 23 % (ref 24–44)
MCH RBC QN AUTO: 35 PG (ref 27–31)
MCHC RBC AUTO-ENTMCNC: 33.6 % (ref 32–36)
MCV RBC AUTO: 104.2 FL (ref 78–100)
MONOCYTES ABSOLUTE: 0.5 K/CU MM
MONOCYTES RELATIVE PERCENT: 8.9 % (ref 0–4)
PDW BLD-RTO: 12.3 % (ref 11.7–14.9)
PLATELET # BLD: 208 K/CU MM (ref 140–440)
PMV BLD AUTO: 9.6 FL (ref 7.5–11.1)
POTASSIUM SERPL-SCNC: 4.1 MMOL/L (ref 3.5–5.1)
RBC # BLD: 4.03 M/CU MM (ref 4.2–5.4)
SEGMENTED NEUTROPHILS ABSOLUTE COUNT: 3.8 K/CU MM
SEGMENTED NEUTROPHILS RELATIVE PERCENT: 66.1 % (ref 36–66)
SODIUM BLD-SCNC: 144 MMOL/L (ref 135–145)
TOTAL PROTEIN: 6.7 GM/DL (ref 6.4–8.2)
WBC # BLD: 5.7 K/CU MM (ref 4–10.5)

## 2022-07-20 PROCEDURE — 80053 COMPREHEN METABOLIC PANEL: CPT

## 2022-07-20 PROCEDURE — 86300 IMMUNOASSAY TUMOR CA 15-3: CPT

## 2022-07-20 PROCEDURE — 85025 COMPLETE CBC W/AUTO DIFF WBC: CPT

## 2022-07-20 PROCEDURE — 36415 COLL VENOUS BLD VENIPUNCTURE: CPT

## 2022-07-22 LAB — CA 27.29: 17 U/ML

## 2022-07-27 ENCOUNTER — OFFICE VISIT (OUTPATIENT)
Dept: ONCOLOGY | Age: 77
End: 2022-07-27
Payer: MEDICARE

## 2022-07-27 ENCOUNTER — HOSPITAL ENCOUNTER (OUTPATIENT)
Dept: INFUSION THERAPY | Age: 77
End: 2022-07-27

## 2022-07-27 VITALS
HEIGHT: 65 IN | OXYGEN SATURATION: 98 % | HEART RATE: 78 BPM | WEIGHT: 213.4 LBS | TEMPERATURE: 97.4 F | BODY MASS INDEX: 35.56 KG/M2 | DIASTOLIC BLOOD PRESSURE: 70 MMHG | SYSTOLIC BLOOD PRESSURE: 147 MMHG

## 2022-07-27 DIAGNOSIS — C50.411 MALIGNANT NEOPLASM OF UPPER-OUTER QUADRANT OF RIGHT BREAST IN FEMALE, ESTROGEN RECEPTOR POSITIVE (HCC): Primary | ICD-10-CM

## 2022-07-27 DIAGNOSIS — Z17.0 MALIGNANT NEOPLASM OF UPPER-OUTER QUADRANT OF RIGHT BREAST IN FEMALE, ESTROGEN RECEPTOR POSITIVE (HCC): Primary | ICD-10-CM

## 2022-07-27 DIAGNOSIS — C50.911 CHEST WALL RECURRENCE OF BREAST CANCER, RIGHT (HCC): ICD-10-CM

## 2022-07-27 DIAGNOSIS — C79.89 CHEST WALL RECURRENCE OF BREAST CANCER, RIGHT (HCC): ICD-10-CM

## 2022-07-27 DIAGNOSIS — E05.00 GRAVES DISEASE: ICD-10-CM

## 2022-07-27 PROCEDURE — 1123F ACP DISCUSS/DSCN MKR DOCD: CPT | Performed by: INTERNAL MEDICINE

## 2022-07-27 PROCEDURE — 99214 OFFICE O/P EST MOD 30 MIN: CPT | Performed by: INTERNAL MEDICINE

## 2022-07-27 RX ORDER — LOTEPREDNOL ETABONATE 3.8 MG/G
GEL OPHTHALMIC
COMMUNITY
Start: 2022-07-07

## 2022-07-27 NOTE — PROGRESS NOTES
Nilson Jesus Name: Latia Keith  Patient : 1945  Patient MRN: [id number]     Primary Oncologist: Griselda Caballero MD  PCP:Dr Jm Malik  Surgeon Dr Kaiedn Mccall     Date of Service: 22    Chief Complaint:   Chief Complaint   Patient presents with    Follow-up        Active Problem list   Breast cancer    HPI  Mrs. Mackenzie Flores ( 45) has a history of right breast cancer, for which she had modified radical mastectomy in . She had a couple of local recurrences, treated with chemotherapy and radiation therapy and excision of the chest wall recurrences. In  she had a total abdominal hysterectomy/bilateral salpingo-oophorectomy, which was done as part of hormonal therapy and Tamoxifen for one year. After that she did not show any evidence of recurrence. In  she presented with right axillary mass which was biopsied and confirmed to be an invasive carcinoma, morphologically consistent with origin from her breast primary. Her ER was positive 90 percent, [place] positive. HER-2/naomi was negative by FISH. All other w/u including CT of the chest, abdomen and pelvis showed no evidence of any intraabdominal mass or metastases. Bone scan was negative for any bony mets. Her MUGA scan showed 61 percent ejection fraction. She was started on Taxol d1&8 [id number] for 6 courses starting in Dec of 2012 till 2013 and showed good partial response with mass size reduced to 2.4 cm in  from 3 cm in 2012 and completed 6 courses in 2013. Right Ax mass completely resolved over next couple years. In May 2013 she was initiated on Hormonal therapy with Arimidex 1 mg daily for maintenance treatment. In continued Clinical remission. 2017 Hemoglobin 14.3, hematocrit 42, white count 5,900, platelet 609,588, BUN 17, creatinine 0.7. Sodium was 141, potassium 3.4, chloride 95, calcium 10.0. The rest of the chemistries are normal. LFTs normal, , uric acid 6.0.  CEA 1.6, CA 27 14.     11-17 DEXA scan showed osteopenia, CT chest abdomen pelvis showed no evidence of metastatic disease, fatty liver, right renal nonobstructing parapelvic cyst, diverticulosis. Nuclear med bone scan showed no evidence to suggest osseous metastatic disease. June 2018 Med Trinh started but never got more than 1  July 2018 Left breast with no mammographic evidence of malignancy  july 2019 Med Trinh restarted but got only one dose again  9-2019 stopped anastrazole for bone pain/arthritic pains, which got better, then placed on letrozole but made her feel bad so she stopped it. 10-19 restarted anastrazole   Feb 2020 stopped AI  Feb 2020 Mammogram normal BIRADS cat 2  June 2020 restarted taking anastrazole. 7/9/2020 limited right breast axillary ultrasound revealed highly suspicious right breast 9:00 mass correlating to the palpable complaint. 7/14/2020 right breast area mass biopsy revealed invasive ductal carcinoma grade 2. ER greater than 95% positive. [place] negative. HER-2 by IHC negative HER-2 by FISH pending. 7/16/2026 PET scan revealed metabolically active nodule along the inferior lateral margin of the right mastectomy site concerning for recurrent disease. This has slowly enlarged since 11/21/2017. No other metabolically active disease. 8/7/20: underwent excision with skin flap. Tumor size was 3cm, Grade 2, DCIS/LCIS not present. ER positive >95%, FL negative and her 2 neg by IHC and FISH. Margins <1mm     No recs for adjuvant XRT per Dr Dominik Oconnor. Resumed AI September 2020 7/22/2021 left screening mammogram.  No mammographic evidence of malignancy. Status post removal of the left chest wall port. PAST MEDICAL HISTORY:   1. Hypertension since 1980s.,  2. Asthma since 2008.,  3. Allergies to mold, animals, dust, et Alicja , seem to bother her ,  4. Arthritis since early 2000 involving her knee and neck. ,  5.  Colonoscopy done in 2011 with biopsy of transverse colon showed benign mucosal lymphoid aggregates and a tubular adenoma from the ascending colon. The rest was negative. (Done by Dr. Elvis Kirkland.) ,  6. CAT scan of the chest, which showed a 3 cm anterior chest wall nodule unchanged from 2011, also present in 2009, when it was 2.5 cm.,  7. Chest x-ray: 2015 No acute cardiopulmonary findings. ,  8. Mammogram of the left breast was negative for any lesions. PAST SURGICAL HISTORY:   1. History of breast cancer as listed above. 2. Squamous cell cancer removed from the leg in . 3. Cholecystectomy in . 4. Heel spur surgery .,  5. Right Total knee replacement in 2011.  6. Lesion removed from her left arm in . 7. Joint replacement in her thumb in May of 2012. 8. Cystic lesions removed by Dr Erica Man from her finger Sep 2014. 9. ear surgery 2015     FAMILY HISTORY:   Both father and mother had coronary artery disease. There is also a history of cancer of the colon and liver in the family. Sister had hepatitis C.     SOCIAL HISTORY:   She has never smoked. She is a nondrinker. She is  since . Her   of a heart attack. 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 Interval History   22: Arrived alone to the clinic today. Reported that she has been diagnosed with graves disease 3 weeks ago and is being followed by endocrinology in [place]. Reported bilateral eye watering, stinging and burning. Exophthalmos. No fever. Intermittently feels anxious and reported palpitations. No chest pain. Gained 4 lbs since last visit. Appetite is OK. LE edema .       Vital Signs:  BP (!) 147/70 (Site: Right Upper Arm, Position: Sitting, Cuff Size: Large Adult)   Pulse 78   Temp 97.4 °F (36.3 °C) (Infrared)   Ht 5' 5\" (1.651 m)   Wt 213 lb 6.4 oz (96.8 kg)   SpO2 98%   BMI 35.51 kg/m²     Physical Exam:      CONSTITUTIONAL: awake, alert, obese, ambulates with a walker   EYES: mahsa, no palor or any icetrus   ENT: ATNC   NECK: no JVD Tahir Germain, KLFR  No results found for: B2M    Coagulation Panel:  Lab Results   Component Value Date    PROTIME 10.9 (L) 12/14/2020    INR 0.90 12/14/2020    APTT 23.8 04/27/2017    DDIMER 919 (H) 12/12/2016       Anemia Panel:  Lab Results   Component Value Date    BCAIYSWZ05 579.2 12/15/2020    FOLATE 19.1 (H) 12/15/2020       Tumor Markers:  Lab Results   Component Value Date    CEA 2.1 01/22/2019    LABCA2 17.0 07/20/2022         Imaging: Reviewed     Pathology:Reviewed     Observations:  Performance Status: ECOG 2   Depression Status: PHQ 9 neg       Assessment & Plan:  1. Ca Breast 1972 with Right Ax Recurrence Nov 2012   Initial diagnosis in 1972 when she had right modified radical mastectomy. She had isolated local recurrences in the early eighties, for which she had excision, followed by radiation therapy and hormonal therapy. After being off treatment for many years, she had another recurrence in November of 2012 and after excision, she had minimal residual disease and has been on maintenance Arimidex for five years after initial chemotherapy with a taxane. Dr Bryson Roldan recommendation was to keep her on hormonal therapy for at least 10 years or even longer. Has been off of AI intermittently for bone pain, last time since feb 2020. Resumed Luma 15 2020   PET with avid right axillary mass, biopsy-proven invasive ductal carcinoma MA ER positive MA negative and HER-2 negative. No other metastatic disease. S/P surgical resection  No recs for any advunat XRT  Plan to Continue AI indefinitely and monitor for AE. Hot flashes tolerable. Dexa scan 11/2017 with osteopenia. Recommend continuation of calcium and vitamin D.  Repeat dexa scan October 2020 with osteopenia in the hips, repeat in 3 years   Mammogram left 7/22/2021 normal, recommend repeat in 1 year  July 2022 , CA 27-29, CBC and CMP within normal limits  Mammogram due august 8 2022     LE edema/lymphedema:Compression devise not helping for the last 6 weeks. Is on diuretics,follows with Dr Mikki Trujillo    Macrocytosis without anemia: Continue to monitor    Graves disease: recent diagnosis: is followed by endocrinology. HTN: Is compliant with antihypertensives and may need adjustment if BOP remains elevated, advised her to check her BP regularly and maintain a log. Recommend low salt diet. Tremors ?sec to ?early parkinsons vs hyperthyroidism. Is followed    Continue other medical care    Discussed the above findings and plan with her and she verbalized understanding    Discussed Lifestyle, healthy diet and regular exercise as tolerated. Also discussed importance of being up-to-date with age-appropriate screening tools, Left mammogram due again July 2022. Colonoscopy august 2021 with diverticulitis, hemorrhoids and one polyp. Recommend follow-up with primary care physician and other specialists. Please do not hesitate to contact us if you need ay further information. Return to clinic in  6 months  or earlier if new symptoms.   [address]

## 2022-07-27 NOTE — PROGRESS NOTES
MA Rooming Questions  Patient: Rochelle Arvizu  MRN: [id number]    Date: 7/27/2022        1. Do you have any new issues? yes - Has recently been diagnosed with Graves Disease         2. Do you need any refills on medications?    no    3. Have you had any imaging done since your last visit? yes - [facility]    4. Have you been hospitalized or seen in the emergency room since your last visit here?   yes - [facility]    5. Did the patient have a depression screening completed today?  No    No data recorded     PHQ-9 Given to (if applicable):               PHQ-9 Score (if applicable):                     [] Positive     []  Negative              Does question #9 need addressed (if applicable)                     [] Yes    []  No               Leretha Boas, CMA

## 2022-08-23 RX ORDER — ANASTROZOLE 1 MG/1
TABLET ORAL
Qty: 90 TABLET | Refills: 3 | Status: SHIPPED | OUTPATIENT
Start: 2022-08-23

## 2022-11-08 RX ORDER — POTASSIUM CHLORIDE 20 MEQ/1
TABLET, EXTENDED RELEASE ORAL
Qty: 30 TABLET | Refills: 5 | OUTPATIENT
Start: 2022-11-08

## 2022-12-15 RX ORDER — POTASSIUM CHLORIDE 20 MEQ/1
TABLET, EXTENDED RELEASE ORAL
Qty: 30 TABLET | Refills: 2 | Status: SHIPPED | OUTPATIENT
Start: 2022-12-15

## 2023-02-18 ENCOUNTER — APPOINTMENT (OUTPATIENT)
Dept: GENERAL RADIOLOGY | Age: 78
End: 2023-02-18
Payer: MEDICARE

## 2023-02-18 ENCOUNTER — HOSPITAL ENCOUNTER (EMERGENCY)
Age: 78
Discharge: HOME | End: 2023-02-18
Payer: MEDICARE

## 2023-02-18 VITALS
WEIGHT: 230 LBS | TEMPERATURE: 98.4 F | HEART RATE: 84 BPM | RESPIRATION RATE: 13 BRPM | OXYGEN SATURATION: 98 % | SYSTOLIC BLOOD PRESSURE: 158 MMHG | BODY MASS INDEX: 34.07 KG/M2 | DIASTOLIC BLOOD PRESSURE: 78 MMHG | HEIGHT: 69 IN

## 2023-02-18 DIAGNOSIS — G89.29 ACUTE EXACERBATION OF CHRONIC LOW BACK PAIN: ICD-10-CM

## 2023-02-18 DIAGNOSIS — M54.50 ACUTE EXACERBATION OF CHRONIC LOW BACK PAIN: ICD-10-CM

## 2023-02-18 PROCEDURE — 99283 EMERGENCY DEPT VISIT LOW MDM: CPT

## 2023-02-18 PROCEDURE — 73562 X-RAY EXAM OF KNEE 3: CPT

## 2023-02-18 PROCEDURE — 73502 X-RAY EXAM HIP UNI 2-3 VIEWS: CPT

## 2023-02-18 PROCEDURE — 6370000000 HC RX 637 (ALT 250 FOR IP): Performed by: PHYSICIAN ASSISTANT

## 2023-02-18 RX ORDER — HYDROCODONE BITARTRATE AND ACETAMINOPHEN 10; 325 MG/1; MG/1
0.5 TABLET ORAL EVERY 6 HOURS PRN
Qty: 10 TABLET | Refills: 0 | Status: SHIPPED | OUTPATIENT
Start: 2023-02-18 | End: 2023-02-23

## 2023-02-18 RX ORDER — HYDROCODONE BITARTRATE AND ACETAMINOPHEN 5; 325 MG/1; MG/1
1 TABLET ORAL ONCE
Status: COMPLETED | OUTPATIENT
Start: 2023-02-18 | End: 2023-02-18

## 2023-02-18 RX ADMIN — HYDROCODONE BITARTRATE AND ACETAMINOPHEN 1 TABLET: 5; 325 TABLET ORAL at 13:23

## 2023-02-18 ASSESSMENT — PAIN SCALES - GENERAL: PAINLEVEL_OUTOF10: 6

## 2023-02-18 NOTE — ED NOTES
Patient noted stable and presenting in no acute distress. Discharge instructions and medication list reviewed (as required) with the patient. All questions and concerns were addressed at this time, and the patient expresses understanding. Patient released with vitals noted as recorded.         Prasanna Jones RN  02/18/23 0225

## 2023-02-18 NOTE — ED PROVIDER NOTES
EMERGENCY DEPARTMENT ENCOUNTER        Pt Name: Latia Keith  MRN: [id number]  [place] 1945  Date of evaluation: 2/18/2023  Provider: GALINA Flores  PCP: Gayatri Moreno MD    JAMAAL. I have evaluated this patient. My supervising physician was available for consultation. Triage CHIEF COMPLAINT       Chief Complaint   Patient presents with    Hip Injury    Knee Injury     HISTORY OF PRESENT ILLNESS      Chief Complaint: Right hip and back pain, right knee pain    Latia Keith is a 68 y.o. female who presents to the emergency department today with continued right buttock/right hip pain. Patient states it has been ongoing over the last week or so. Patient has been seen at a freestanding emergency department, through primary care with continued symptoms. Has had no recent injury or trauma. Patient verbalizes concern for her \"sciatica flaring up\". States has had history of such in the past.  No recent fall and/or trauma. Describes no new alarming symptoms such as bowel or bladder incontinence saddle anesthesia or radicular weakness. No abdominal pain no urinary pain. Patient states that she has been on baclofen, was placed on steroids and was given a Toradol shot and her symptoms continue to worsen. She does live at home by herself and has been able to ambulate and get around to perform her normal activities of daily living but does feel limited secondary to her pain. Nursing Notes were all reviewed and agreed with or any disagreements were addressed in the HPI. REVIEW OF SYSTEMS     At least 6 systems reviewed and otherwise acutely negative except as in the [address]      PAST MEDICAL HISTORY     Past Medical History:   Diagnosis Date    Arthritis     Asthma     last flare up 5 yrs ago    Cancer [facility])     \"dx with right breast cancer 1972- tx with surgery and tx with chemo and radiation- then 2013 it came  back in the same breast- tx with chemo -follows with Dr Glover Flank 2020 and taking chemo again    Hiatal hernia     History of blood transfusion     History of external beam radiation therapy 1982    right chest wall    History of motion sickness     History of UTI     last one UTI    Hyperlipidemia     Hypertension     IBS (irritable bowel syndrome)     Lumbar stenosis     PONV (postoperative nausea and vomiting)     Reflux     Stress incontinence     Thyroid nodule     for bx 4/27/2017       SURGICAL HISTORY     Past Surgical History:   Procedure Laterality Date    APPENDECTOMY      took with the hyster    AXILLARY SURGERY Right 08/07/2020    right lateral chest wall/axillary mass excision per Dr. Rene Coon at [facility]  2004    COLONOSCOPY  2011    COLONOSCOPY  04/20/2017    Normal    COLONOSCOPY N/A 8/12/2021    COLONOSCOPY WITH BIOPSY performed by Miguelina Lara MD at [address]  40+ yr ago    after miscarriage     FINGER SURGERY Left 2014    index finger - removed a nodule    FOREARM SURGERY Right 1998     fx  repair with pin placement    HAND TENDON SURGERY  2012    Tendon removed left arm placed left thumb    HEEL SPUR SURGERY Bilateral right    done in 2015 and right done 2013    HYSTERECTOMY (CERVIX STATUS UNKNOWN)  age 45    \"took ovaries and appendix also\"    JOINT REPLACEMENT  right    knee- done 2011    JOINT REPLACEMENT  left    knee- done 12/2016    MASTECTOMY, RADICAL Right 1972    took one lymph node and was positive    OTHER SURGICAL HISTORY  04/27/2017    Thyroid biopsy    OTHER SURGICAL HISTORY Right 1982    right axillary mass bx - breast CA recurrence    SKIN BIOPSY      TUNNELED VENOUS PORT PLACEMENT  2013    left side of chest       CURRENTMEDICATIONS       Discharge Medication List as of 2/18/2023  1:27 PM        CONTINUE these medications which have NOT CHANGED    Details   hydroCHLOROthiazide (HYDRODIURIL) 25 MG tablet Take 25 mg by mouth dailyHistorical Med      Chlorpheniramine Maleate (ALLERGY RELIEF PO) Take 1 tablet by mouth dailyHistorical Med      fenofibrate (TRICOR) 54 MG tablet Take 54 mg by mouth daily s [facility]: Please dispense generic fenofibrate unless prescriber denoteHistorical Med      spironolactone (ALDACTONE) 50 MG tablet Take 1 tablet by mouth daily, Disp-30 tablet, R-3Normal      potassium chloride (KLOR-CON M) 20 MEQ extended release tablet TAKE ONE TABLET BY MOUTH DAILY, Disp-30 tablet, R-2Normal      anastrozole (ARIMIDEX) 1 MG tablet TAKE ONE TABLET BY MOUTH EVERY DAY, Disp-90 tablet, R-3This prescription was filled on 8/23/2022. Any refills authorized will be placed on file. Normal      furosemide (LASIX) 20 MG tablet TAKE TWO TABLETS BY MOUTH EVERY MORNING AND ONE TABLET EVERY EVENING, Disp-270 tablet, R-3This prescription was filled on 8/10/2022. Any refills authorized will be placed on file. Normal      LOTEMAX SM 0.38 % GEL Instill 1 drop into both eyes every day, DAWHistorical Med      Cholecalciferol (VITAMIN D3 PO) Take 1 tablet by mouth dailyHistorical Med      VITAMIN A PO Take by mouthHistorical Med      Probiotic Product (PROBIOTIC-10 PO) Take by mouthHistorical Med      calcium carbonate 600 MG TABS tablet Take 1 tablet by mouth dailyHistorical Med      Ascorbic Acid (VITAMIN C) 500 MG CAPS Take 1 capsule by mouth dailyHistorical Med      NONFORMULARY Relief factor 3 packs qdHistorical Med      loperamide (IMODIUM A-D) 2 MG tablet take 2 tablet by oral route after 1st loose stool and 1 tablet (2 mg) after each next bowel movement; do not exceed 16 mg in 24hrsHistorical Med      B Complex-C (SUPER B COMPLEX PO) Take 1 tablet by mouth dailyHistorical Med      allopurinol (ZYLOPRIM) 100 MG tablet Take 100 mg by mouth dailyHistorical Med      acetaminophen (TYLENOL) 650 MG extended release tablet Take 650 mg by mouth 3 times daily Historical Med      atenolol (TENORMIN) 25 MG tablet Take 12.5 mg by mouth daily Historical Med      omeprazole (PRILOSEC) 20 MG capsule Take 20 mg by mouth daily.  Historical Med      bismuth subsalicylate (PEPTO BISMOL) 262 MG/15ML suspension Take 15 mLs by mouth daily as needed. Historical Med             ALLERGIES     Grass extracts [gramineae pollens], Adhesive tape, Antivert [meclizine hcl], Atorvastatin calcium, Meclizine, Meclizine hcl, and Monascus purpureus went yeast    FAMILYHISTORY       Family History   Problem Relation Age of Onset    Emphysema Mother     Heart Disease Mother     Heart Disease Father         MI    Liver Disease Sister     Depression Sister     Heart Disease Sister     Cancer Paternal Aunt     Cancer Paternal Uncle     Cancer Maternal Grandmother         SOCIAL HISTORY       Social History     Socioeconomic History    Marital status:    Tobacco Use    Smoking status: Never    Smokeless tobacco: Never   Vaping Use    Vaping Use: Never used   Substance and Sexual Activity    Alcohol use: No    Drug use: No       SCREENINGS    Aleksandr Coma Scale  Eye Opening: Spontaneous  Best Verbal Response: Oriented  Best Motor Response: Obeys commands  Scenic Coma Scale Score: 15      PHYSICAL EXAM       ED Triage Vitals [02/18/23 1153]   BP Temp Temp src Heart Rate Resp SpO2 Height Weight   (!) 158/78 98.4 °F (36.9 °C) -- 84 13 98 % 5' 9\" (1.753 m) 230 lb (104.3 kg)      Constitutional:  Well developed, Well nourished. No distress  HENT:  Normocephalic, Atraumatic, PERRL. EOMI. Sclera clear. Conjunctiva normal, No discharge. Oropharynx is within normal limits, no tonsillar hypertrophy white exudate, tolerating secretions. Bilateral TMs are pearly white without signs of significant erythema or effusion. No perforation. No mastoid tenderness. Neck/Lymphatics:  supple, no JVD, no swollen nodes  Cardiovascular:   RRR,  no murmurs/rubs/gallops. Respiratory:   Nonlabored breathing. Normal breath sounds, No wheezing  Abdomen: Bowel sounds normal, Soft, No tenderness, no masses.   :  No significant flank tenderness to percussion. Musculoskeletal:    On evaluation of the back, no obvious defect or deformity, no obvious digit discoloration palpable or swelling. No reproducible tenderness, there is mild tenderness into the right sciatic notch area. There is no edema, asymmetry, or calf / thigh tenderness bilaterally. No cyanosis. No cool or pale-appearing limb. Distal cap refill and pulses intact bilateral upper and lower extremities  Bilateral upper and lower extremity ROM intact without pain or obvious deficit  Integument:   Warm, Dry  Neurologic:  Alert & oriented , No focal deficits noted. Cranial nerves II through XII grossly intact. Normal gross motor coordination & motor strength bilateral upper and lower extremities  Sensation intact. Psychiatric:  Affect normal, Mood normal.     DIAGNOSTIC RESULTS   LABS:    Labs Reviewed - No data to display    When ordered, only abnormal lab results are displayed. All other labs were within normal range or not returned as of this dictation. EKG: When ordered, EKG's are interpreted by the Emergency Department Physician in the absence of a cardiologist.  Please see their note for interpretation of EKG. RADIOLOGY:   Non-plain film images such as CT, Ultrasound and MRI are read by the radiologist. Plain radiographic images are visualized and preliminarily interpreted by the  ED Provider with the below findings:    Interpretation [facility] Radiologist below, if available at the time of this note:    XR HIP 2-3 VW W PELVIS RIGHT   Preliminary Result   No acute osseous abnormality identified at the pelvis, right hip and right   knee. A right total knee arthroplasty is in place. XR KNEE RIGHT (3 VIEWS)   Preliminary Result   No acute osseous abnormality identified at the pelvis, right hip and right   knee. A right total knee arthroplasty is in place.            XR KNEE RIGHT (3 VIEWS)    Result Date: 2/18/2023  EXAMINATION: ONE XRAY VIEW OF THE PELVIS AND TWO XRAY VIEWS RIGHT HIP; THREE XRAY VIEWS OF THE RIGHT KNEE 2/18/2023 11:52 am COMPARISON: Right leg dated 01/07/2020. HISTORY: ORDERING SYSTEM PROVIDED HISTORY: fall hip pain injury TECHNOLOGIST PROVIDED HISTORY: Reason for exam:->fall hip pain injury Reason for Exam: fall, hip pain; ORDERING SYSTEM PROVIDED HISTORY: fall pain injury TECHNOLOGIST PROVIDED HISTORY: Reason for exam:->fall pain injury Reason for Exam: knee pain, fall FINDINGS: Pelvis and right hip: There is no evidence of acute fracture or dislocation. The sacroiliac joints are intact. A focal soft tissue abnormality is not identified. Lateral view is suboptimally positioned. Right knee: The patient is status post right total knee arthroplasty with patellar resurfacing. There is no periprosthetic fracture or significant periprosthetic lucency. There is no joint effusion. No acute osseous abnormality identified at the pelvis, right hip and right knee. A right total knee arthroplasty is in place. XR HIP 2-3 VW W PELVIS RIGHT    Result Date: 2/18/2023  EXAMINATION: ONE XRAY VIEW OF THE PELVIS AND TWO XRAY VIEWS RIGHT HIP; THREE XRAY VIEWS OF THE RIGHT KNEE 2/18/2023 11:52 am COMPARISON: Right leg dated 01/07/2020. HISTORY: ORDERING SYSTEM PROVIDED HISTORY: fall hip pain injury TECHNOLOGIST PROVIDED HISTORY: Reason for exam:->fall hip pain injury Reason for Exam: fall, hip pain; ORDERING SYSTEM PROVIDED HISTORY: fall pain injury TECHNOLOGIST PROVIDED HISTORY: Reason for exam:->fall pain injury Reason for Exam: knee pain, fall FINDINGS: Pelvis and right hip: There is no evidence of acute fracture or dislocation. The sacroiliac joints are intact. A focal soft tissue abnormality is not identified. Lateral view is suboptimally positioned. Right knee: The patient is status post right total knee arthroplasty with patellar resurfacing. There is no periprosthetic fracture or significant periprosthetic lucency. There is no joint effusion.      No acute osseous abnormality identified at the pelvis, right hip and right knee. A right total knee arthroplasty is in place. PROCEDURES   Unless otherwise noted below, none       CRITICAL CARE   CRITICAL CARE NOTE:   N/A    CONSULTS:  None      EMERGENCY DEPARTMENT COURSE and MDM:   Vitals:    Vitals:    02/18/23 1153   BP: (!) 158/78   Pulse: 84   Resp: 13   Temp: 98.4 °F (36.9 °C)   SpO2: 98%   Weight: 230 lb (104.3 kg)   Height: 5' 9\" (1.753 m)       Patient was given thefollowing medications:  Medications   HYDROcodone-acetaminophen (NORCO) 5-325 MG per tablet 1 tablet (1 tablet Oral Given 2/18/23 1323)         Is this patient to be included in the SEP-1 Core Measure due to severe sepsis or septic shock? No   Exclusion criteria - the patient is NOT to be included for SEP-1 Core Measure due to: Infection is not suspected    MDM:    CC/HPI Summary, DDx, ED Course, and Reassessment:     Patient presents as above. Emergent etiologies considered. Patient seen and examined. Work-up initiated secondary to presentation, physical exam findings, vital signs and medical chart review. In brief, 77-year-old female presented emerged department today with acute on chronic right buttock/pelvic pain. Has been seen by [facility] as well as primary care and has been given conservative management. Continues to have symptoms today. No new trauma, no new alarming symptoms. Patient is neurovascularly intact without alarming symptoms. X-ray imaging negative for acute fracture into the pelvis or hip patient is status post TKA in the right. Has adequate range of motion. Sensate throughout. Long discussion with patient about sciatica and given reassurance about any concern for acute spinal issue. At this point I feel like her issue is mainly pain control. We will give 10/325 Norco that she will cut in half for breakthrough pain, advised to continue the baclofen and advised her on conservative management.   Need to follow-up with primary care and orthopedic spine. Patient be discharged home in stable condition. History from : Patient    Limitations to history : None    Patient was given the following medications:  Medications   HYDROcodone-acetaminophen (NORCO) 5-325 MG per tablet 1 tablet (1 tablet Oral Given 2/18/23 1323)       Discussion with Other Profesionals : None    Social Determinants : None    Records Reviewed : Outpatient Notes recent primary care notes and External ED Note freestanding [facility] encounter    Appropriate for outpatient management will advise close follow-up with PCP/orthopedic spine physician. I am the Primary Clinician of Record. CLINICAL IMPRESSION      1. Acute exacerbation of chronic low back pain          DISPOSITION/PLAN   DISPOSITION Decision To Discharge 02/18/2023 01:20:00 PM      PATIENT REFERREDTO:  Evelio Bajwa MD  [address] [facility] [address] [facility]  [phone]    Schedule an appointment as soon as possible for a visit       Phan Field MD  [address]    Schedule an appointment as soon as possible for a visit         DISCHARGE MEDICATIONS:  Discharge Medication List as of 2/18/2023  1:27 PM        START taking these medications    Details   HYDROcodone-acetaminophen (NORCO)  MG per tablet Take 0.5 tablets by mouth every 6 hours as needed for Pain for up to 5 days. Intended supply: 30 days Max Daily Amount: 2 tablets, Disp-10 tablet, R-0Normal             DISCONTINUED MEDICATIONS:  Discharge Medication List as of 2/18/2023  1:27 PM          (Please note that portions ofthis note were completed with a voice recognition program.  Efforts were made to edit the dictations but occasionally words are mis-transcribed. )    GALINA Burt (electronically signed)             GALINA Coleman  02/18/23 0513

## 2023-02-18 NOTE — ED NOTES
Not able to scan medication due to tech issues. Patient medicated with provider at bedside.       Clarice Lea RN  02/18/23 1749

## 2023-02-18 NOTE — ED TRIAGE NOTES
67 y/o female to the ed with a c/c of right hip and knee pain after tripping on February 2nd. Patient denies fall. Patient noted with PMH of right knee replacement x 10 years prior Patient denies chest pain, sob or difficulty breathing. Patient denies ABD pain, n/v/d or fever. Patient noted with + msp x 4, pupils PERRLA @ 3 and lung sounds that are clear and equil bilaterally. Patient placed on telemetry, BP and pulse ox. Vitals noted as recorded. Call light placed within patient's reach, and bed in lowest position with side rails up x 2 for safety. Provider at the bedside for assessment.

## 2023-03-09 ENCOUNTER — APPOINTMENT (OUTPATIENT)
Dept: GENERAL RADIOLOGY | Age: 78
End: 2023-03-09
Payer: MEDICARE

## 2023-03-09 ENCOUNTER — HOSPITAL ENCOUNTER (EMERGENCY)
Age: 78
Discharge: HOME | End: 2023-03-10
Payer: MEDICARE

## 2023-03-09 DIAGNOSIS — G25.0 ESSENTIAL TREMOR: ICD-10-CM

## 2023-03-09 DIAGNOSIS — I10 HYPERTENSION, UNSPECIFIED TYPE: Primary | ICD-10-CM

## 2023-03-09 DIAGNOSIS — M54.50 ACUTE LOW BACK PAIN, UNSPECIFIED BACK PAIN LATERALITY, UNSPECIFIED WHETHER SCIATICA PRESENT: ICD-10-CM

## 2023-03-09 LAB
ALBUMIN SERPL-MCNC: 4.4 GM/DL (ref 3.4–5)
ALP BLD-CCNC: 66 IU/L (ref 40–129)
ALT SERPL-CCNC: 14 U/L (ref 10–40)
ANION GAP SERPL CALCULATED.3IONS-SCNC: 11 MMOL/L (ref 4–16)
AST SERPL-CCNC: 14 IU/L (ref 15–37)
BASOPHILS ABSOLUTE: 0 K/CU MM
BASOPHILS RELATIVE PERCENT: 0.5 % (ref 0–1)
BILIRUB SERPL-MCNC: 0.2 MG/DL (ref 0–1)
BILIRUBIN URINE: NEGATIVE MG/DL
BLOOD, URINE: NEGATIVE
BUN SERPL-MCNC: 22 MG/DL (ref 6–23)
CALCIUM SERPL-MCNC: 10 MG/DL (ref 8.3–10.6)
CHLORIDE BLD-SCNC: 106 MMOL/L (ref 99–110)
CLARITY: CLEAR
CO2: 25 MMOL/L (ref 21–32)
COLOR: YELLOW
COMMENT UA: ABNORMAL
CREAT SERPL-MCNC: 0.8 MG/DL (ref 0.6–1.1)
DIFFERENTIAL TYPE: ABNORMAL
EOSINOPHILS ABSOLUTE: 0 K/CU MM
EOSINOPHILS RELATIVE PERCENT: 0.7 % (ref 0–3)
GFR SERPL CREATININE-BSD FRML MDRD: >60 ML/MIN/1.73M2
GLUCOSE SERPL-MCNC: 105 MG/DL (ref 70–99)
GLUCOSE, URINE: NEGATIVE MG/DL
HCT VFR BLD CALC: 43.4 % (ref 37–47)
HEMOGLOBIN: 14 GM/DL (ref 12.5–16)
IMMATURE NEUTROPHIL %: 1 % (ref 0–0.43)
KETONES, URINE: ABNORMAL MG/DL
LEUKOCYTE ESTERASE, URINE: NEGATIVE
LYMPHOCYTES ABSOLUTE: 1.1 K/CU MM
LYMPHOCYTES RELATIVE PERCENT: 18.8 % (ref 24–44)
MCH RBC QN AUTO: 34.1 PG (ref 27–31)
MCHC RBC AUTO-ENTMCNC: 32.3 % (ref 32–36)
MCV RBC AUTO: 105.6 FL (ref 78–100)
MONOCYTES ABSOLUTE: 0.5 K/CU MM
MONOCYTES RELATIVE PERCENT: 8.6 % (ref 0–4)
NITRITE URINE, QUANTITATIVE: NEGATIVE
NUCLEATED RBC %: 0 %
PDW BLD-RTO: 12.5 % (ref 11.7–14.9)
PH, URINE: 5 (ref 5–8)
PLATELET # BLD: 211 K/CU MM (ref 140–440)
PMV BLD AUTO: 9.5 FL (ref 7.5–11.1)
POTASSIUM SERPL-SCNC: 4.1 MMOL/L (ref 3.5–5.1)
PRO-BNP: 101.5 PG/ML
PROTEIN UA: NEGATIVE MG/DL
RBC # BLD: 4.11 M/CU MM (ref 4.2–5.4)
REASON FOR REJECTION: NORMAL
REASON FOR REJECTION: NORMAL
REJECTED TEST: NORMAL
REJECTED TEST: NORMAL
SEGMENTED NEUTROPHILS ABSOLUTE COUNT: 4.1 K/CU MM
SEGMENTED NEUTROPHILS RELATIVE PERCENT: 70.4 % (ref 36–66)
SODIUM BLD-SCNC: 142 MMOL/L (ref 135–145)
SPECIFIC GRAVITY UA: 1.02 (ref 1–1.03)
TOTAL IMMATURE NEUTOROPHIL: 0.06 K/CU MM
TOTAL NUCLEATED RBC: 0 K/CU MM
TOTAL PROTEIN: 6.2 GM/DL (ref 6.4–8.2)
TROPONIN T: <0.01 NG/ML
UROBILINOGEN, URINE: 0.2 MG/DL (ref 0.2–1)
WBC # BLD: 5.8 K/CU MM (ref 4–10.5)

## 2023-03-09 PROCEDURE — 85025 COMPLETE CBC W/AUTO DIFF WBC: CPT

## 2023-03-09 PROCEDURE — 6370000000 HC RX 637 (ALT 250 FOR IP): Performed by: PHYSICIAN ASSISTANT

## 2023-03-09 PROCEDURE — 76937 US GUIDE VASCULAR ACCESS: CPT

## 2023-03-09 PROCEDURE — 81003 URINALYSIS AUTO W/O SCOPE: CPT

## 2023-03-09 PROCEDURE — 96374 THER/PROPH/DIAG INJ IV PUSH: CPT

## 2023-03-09 PROCEDURE — 83880 ASSAY OF NATRIURETIC PEPTIDE: CPT

## 2023-03-09 PROCEDURE — 6360000002 HC RX W HCPCS: Performed by: PHYSICIAN ASSISTANT

## 2023-03-09 PROCEDURE — 80053 COMPREHEN METABOLIC PANEL: CPT

## 2023-03-09 PROCEDURE — 2709999900 HC NON-CHARGEABLE SUPPLY

## 2023-03-09 PROCEDURE — 93005 ELECTROCARDIOGRAM TRACING: CPT | Performed by: PHYSICIAN ASSISTANT

## 2023-03-09 PROCEDURE — 96375 TX/PRO/DX INJ NEW DRUG ADDON: CPT

## 2023-03-09 PROCEDURE — 87086 URINE CULTURE/COLONY COUNT: CPT

## 2023-03-09 PROCEDURE — 71045 X-RAY EXAM CHEST 1 VIEW: CPT

## 2023-03-09 PROCEDURE — 99285 EMERGENCY DEPT VISIT HI MDM: CPT

## 2023-03-09 PROCEDURE — 2580000003 HC RX 258: Performed by: PHYSICIAN ASSISTANT

## 2023-03-09 PROCEDURE — 84484 ASSAY OF TROPONIN QUANT: CPT

## 2023-03-09 PROCEDURE — 36410 VNPNXR 3YR/> PHY/QHP DX/THER: CPT

## 2023-03-09 PROCEDURE — 96361 HYDRATE IV INFUSION ADD-ON: CPT

## 2023-03-09 RX ORDER — 0.9 % SODIUM CHLORIDE 0.9 %
250 INTRAVENOUS SOLUTION INTRAVENOUS ONCE
Status: COMPLETED | OUTPATIENT
Start: 2023-03-09 | End: 2023-03-09

## 2023-03-09 RX ORDER — ASPIRIN 81 MG/1
324 TABLET, CHEWABLE ORAL ONCE
Status: COMPLETED | OUTPATIENT
Start: 2023-03-09 | End: 2023-03-09

## 2023-03-09 RX ORDER — FENTANYL CITRATE 50 UG/ML
50 INJECTION, SOLUTION INTRAMUSCULAR; INTRAVENOUS ONCE
Status: COMPLETED | OUTPATIENT
Start: 2023-03-09 | End: 2023-03-09

## 2023-03-09 RX ORDER — KETOROLAC TROMETHAMINE 30 MG/ML
15 INJECTION, SOLUTION INTRAMUSCULAR; INTRAVENOUS ONCE
Status: COMPLETED | OUTPATIENT
Start: 2023-03-09 | End: 2023-03-09

## 2023-03-09 RX ADMIN — FENTANYL CITRATE 50 MCG: 50 INJECTION INTRAMUSCULAR; INTRAVENOUS at 14:44

## 2023-03-09 RX ADMIN — ASPIRIN 81 MG 324 MG: 81 TABLET ORAL at 18:41

## 2023-03-09 RX ADMIN — SODIUM CHLORIDE 250 ML: 9 INJECTION, SOLUTION INTRAVENOUS at 14:44

## 2023-03-09 RX ADMIN — KETOROLAC TROMETHAMINE 15 MG: 30 INJECTION, SOLUTION INTRAMUSCULAR at 14:47

## 2023-03-09 ASSESSMENT — PAIN SCALES - GENERAL: PAINLEVEL_OUTOF10: 8

## 2023-03-09 ASSESSMENT — PAIN DESCRIPTION - LOCATION: LOCATION: BACK

## 2023-03-09 ASSESSMENT — PAIN - FUNCTIONAL ASSESSMENT: PAIN_FUNCTIONAL_ASSESSMENT: 0-10

## 2023-03-09 ASSESSMENT — PAIN DESCRIPTION - PAIN TYPE: TYPE: ACUTE PAIN

## 2023-03-09 ASSESSMENT — PAIN DESCRIPTION - ORIENTATION: ORIENTATION: LOWER

## 2023-03-09 NOTE — CARE COORDINATION
CM received consult from Dilshad Martin CNP for patient in room #2 to assist with discharge planning. CM met with patient to begin discharge planning. CM introduced self and CM role. Patient presents to ER with c/o hypertension. Patient states she was at [facility] for therapy when she was advised that her blood pressure was high and she needed to be evaluated in the ER. Patient states she has been receiving outpatient physical therapy at [facility] twice a week. Patient reports she lives alone in a [address] in [place]. Patient has a PCP and insurance which assists with medication affordability. Patient uses the following DME: walker, lymphedema wraps. Patient states she has been unable to drive recently and has been depending on her brother in law for transportation. Patient denies [facility], but states she is interested in [facility] upon discharge. CM provided patient with [facility] list.     [id number] CM met with patient to determine [facility] selection. Patient first choice [facility].

## 2023-03-09 NOTE — ED PROVIDER NOTES
Emergency Department Encounter  Location: [address] [place] [address] EMERGENCY DEPARTMENT    Patient: Alden Machado  MRN: [id number]  : 1945  Date of evaluation: 3/9/2023  ED Provider: LISA Breen CNP    [phone]  Alden Machado was checked out to me by Markus Escamilla PA-C. Please see his/her initial documentation for details of the patient's initial ED presentation, physical exam and completed studies. In brief, Alden Machado is a 68 y.o. female that presented to the emergency department sciatica pain. Patient was at PT for her sciatica. When she was getting ready to leave staff became aware that she had hypertension. As a result she was sent to the emergency department for evaluation. During her evaluation today with the PA that saw her she also reported some confusion and possible UTI. She had associated left arm pain which became concerning for ACS so work-up was attained.   Currently d-dimer pending    I have reviewed and interpreted all of the currently available lab results and diagnostics from this visit:  Results for orders placed or performed during the hospital encounter of 23   Culture, Urine    Specimen: Urine, clean catch   Result Value Ref Range    Specimen URINE CLEAN CATCH     Special Requests NONE     Culture Final Report No growth at 18 to 36 hours    CBC with Auto Differential   Result Value Ref Range    WBC 5.8 4.0 - 10.5 K/CU MM    RBC 4.11 (L) 4.2 - 5.4 M/CU MM    Hemoglobin 14.0 12.5 - 16.0 GM/DL    Hematocrit 43.4 37 - 47 %    .6 (H) 78 - 100 FL    MCH 34.1 (H) 27 - 31 PG    MCHC 32.3 32.0 - 36.0 %    RDW 12.5 11.7 - 14.9 %    Platelets 697 278 - 057 K/CU MM    MPV 9.5 7.5 - 11.1 FL    Differential Type AUTOMATED DIFFERENTIAL     Segs Relative 70.4 (H) 36 - 66 %    Lymphocytes % 18.8 (L) 24 - 44 %    Monocytes % 8.6 (H) 0 - 4 %    Eosinophils % 0.7 0 - 3 %    Basophils % 0.5 0 - 1 %    Segs Absolute 4.1 K/CU MM    Lymphocytes Absolute 1.1 K/CU MM    Monocytes Absolute 0.5 K/CU MM    Eosinophils Absolute 0.0 K/CU MM    Basophils Absolute 0.0 K/CU MM    Nucleated RBC % 0.0 %    Total Nucleated RBC 0.0 K/CU MM    Total Immature Neutrophil 0.06 K/CU MM    Immature Neutrophil % 1.0 (H) 0 - 0.43 %   Comprehensive Metabolic Panel   Result Value Ref Range    Sodium 142 135 - 145 MMOL/L    Potassium 4.1 3.5 - 5.1 MMOL/L    Chloride 106 99 - 110 mMol/L    CO2 25 21 - 32 MMOL/L    BUN 22 6 - 23 MG/DL    Creatinine 0.8 0.6 - 1.1 MG/DL    Est, Glom Filt Rate >60 >60 mL/min/1.73m2    Glucose 105 (H) 70 - 99 MG/DL    Calcium 10.0 8.3 - 10.6 MG/DL    Albumin 4.4 3.4 - 5.0 GM/DL    Total Protein 6.2 (L) 6.4 - 8.2 GM/DL    Total Bilirubin 0.2 0.0 - 1.0 MG/DL    ALT 14 10 - 40 U/L    AST 14 (L) 15 - 37 IU/L    Alkaline Phosphatase 66 40 - 129 IU/L    Anion Gap 11 4 - 16   Troponin   Result Value Ref Range    Troponin T <0.010 <0.01 NG/ML   Brain Natriuretic Peptide   Result Value Ref Range    Pro-.5 <300 PG/ML   Urinalysis   Result Value Ref Range    Color, UA YELLOW YELLOW    Clarity, UA CLEAR CLEAR    Glucose, Urine NEGATIVE NEGATIVE MG/DL    Bilirubin Urine NEGATIVE NEGATIVE MG/DL    Ketones, Urine TRACE (A) NEGATIVE MG/DL    Specific Gravity, UA 1.025 1.001 - 1.035    Blood, Urine NEGATIVE NEGATIVE    pH, Urine 5.0 5.0 - 8.0    Protein, UA NEGATIVE NEGATIVE MG/DL    Urobilinogen, Urine 0.2 0.2 - 1.0 MG/DL    Nitrite Urine, Quantitative NEGATIVE NEGATIVE    Leukocyte Esterase, Urine NEGATIVE NEGATIVE    Urinalysis Comments       Microscopic exam not performed based on chemical results unless requested in original order.    SPECIMEN REJECTION   Result Value Ref Range    Rejected Test DIMER     Reason for Rejection UNABLE TO PERFORM TESTING:    SPECIMEN REJECTION   Result Value Ref Range    Rejected Test DIMER     Reason for Rejection UNABLE TO PERFORM TESTING:    EKG 12 Lead   Result Value Ref Range    Ventricular Rate 100 BPM    Atrial Rate 100 BPM    P-R Interval 198 ms    QRS Duration 76 ms    Q-T Interval 336 ms    QTc Calculation (Bazett) 433 ms    P Axis 56 degrees    R Axis 29 degrees    T Axis 49 degrees    Diagnosis       Normal sinus rhythm  Normal ECG  When compared with ECG of 14-DEC-2020 09:12,  Nonspecific T wave abnormality no longer evident in Inferior leads  Confirmed by Julissa Fitch MD, Krystal Bahena ([id number]) on 3/11/2023 6:43:16 AM       No results found. Final ED Course and MDM: In brief, Cb Gerardo is a 68 y.o. female whose care was signed out to me by the outgoing provider. She presented to the emergency department per directions of PT staff for evaluation for htn, tachycardia, and L sided arm pain and chronic lowr back pain. Pt labs including cbc, and metabolic panel and trop back and negative. . Cxr whos atelectasis vs infiltrate. D dimer pending however was hemolyzed per lab. A second D-dimer was sent but it too was hemolyzed. At this point CTA was ordered and is currently pending. 65    I have signed out Cb Gerardo 's emergency department care to [place], [place]. We discussed the pertinent history, physical exam, completed/pending test results (if applicable) and current treatment plan. Please refer to his/her chart for the patient's remaining emergency department course and final disposition.       ED Medication Orders (From admission, onward)      Start Ordered     Status Ordering Provider    03/10/23 0130 03/10/23 0129  oxyCODONE-acetaminophen (PERCOCET) 5-325 MG per tablet 1 tablet  ONCE         Last MAR action: Given - by Kathleen Levin on 03/10/23 at [address]    03/10/23 0021 03/10/23 0021  iopamidol (ISOVUE-370) 76 % injection 75 mL  IMG ONCE PRN         Last MAR action: Given - by Iman Smiley on 03/10/23 at [address] [facility] [place] A    03/09/23 1830 03/09/23 1826  aspirin chewable tablet 324 mg  ONCE         Last MAR action: Given - by Sreekanth Shipley on 03/09/23 at [place] [address] [address] A    03/09/23 1415 03/09/23 1400  fentaNYL (SUBLIMAZE) injection 50 mcg  ONCE         Last MAR action: Given - by An[facility] Dev[facility]    03/09/23 1415 03/09/23 1400  0.9 % sodium chloride bolus  ONCE         Last MAR action: Stopped - by [facility] Dev[facility]    03/09/23 1415 03/09/23 1402  ketorolac (TORADOL) injection 15 mg  ONCE         Last MAR action: Given - by Anayeli Devries on 03/09/23 at [address] [facility] [address] [facility] [address]            Final Impression      1. Hypertension, unspecified type    2. Acute low back pain, unspecified back pain laterality, unspecified whether sciatica present    3.  Essential tremor        DISPOSITION Decision To Discharge 03/10/2023 01:13:38 AM     (Please note that portions of this note may have been completed with a voice recognition program. Efforts were made to edit the dictations but occasionally words are mis-transcribed.)    Lnicoln Fox, APRN - [facility], APRN - CNP  03/11/23 1084

## 2023-03-09 NOTE — ED PROVIDER NOTES
EMERGENCY DEPARTMENT ENCOUNTER        Pt Name: Julissa Perdomo  MRN: [id number]  [place] 1945  Date of evaluation: 3/9/2023  Provider: GALINA Ceron  PCP: Rhiannon Moncada MD    JAMAAL. I have evaluated this patient. My supervising physician was available for consultation. Triage CHIEF COMPLAINT       Chief Complaint   Patient presents with    Back Pain     Right lower back/buttocks radiating into right knee and thigh         HISTORY OF PRESENT ILLNESS      Chief Complaint: Right leg pain, hypertension, tachycardia    Julissa Perdomo is a 68 y.o. female who presents to the emergency department today sent in by physical therapy for high blood pressure readings and tachycardia. Patient has had ongoing right sided sciatica pain which is why she is undergoing therapy at [facility] [place], operated by [facility]. Prior to therapy beginning her vital signs were taken and it showed that she was hypertensive and tachycardic. This concerned the therapist.      Patient contributes these abnormal vital signs to her pain. She mentions that she had some \"left arm tingling\" during therapy but no active chest pain, no pleuritic component. No significant shortness of breath or MITCHELL. No significant history of CAD. Patient does have a history of cancer, no history of PE or aortic pathology. Patient also admits that she has had some intermittent episodes of \"confusion\" contributes it to a UTI. She otherwise appears well is alert and oriented answering questions appropriately    Nursing Notes were all reviewed and agreed with or any disagreements were addressed in the HPI. REVIEW OF SYSTEMS     At least 10 systems reviewed and otherwise acutely negative except as in the [address]      PAST MEDICAL HISTORY     Past Medical History:   Diagnosis Date    Arthritis     Asthma     last flare up 5 yrs ago    Cancer [facility])     \"dx with right breast cancer 1972- tx with surgery and tx with chemo and radiation- then 2013 it came  back in the same breast- tx with chemo -follows with Dr Fonseca [place] 2020 and taking chemo again    Hiatal hernia     History of blood transfusion     History of external beam radiation therapy 1982    right chest wall    History of motion sickness     History of UTI     last one UTI    Hyperlipidemia     Hypertension     IBS (irritable bowel syndrome)     Lumbar stenosis     PONV (postoperative nausea and vomiting)     Reflux     Stress incontinence     Thyroid nodule     for bx 4/27/2017       SURGICAL HISTORY     Past Surgical History:   Procedure Laterality Date    APPENDECTOMY      took with the hyster    AXILLARY SURGERY Right 08/07/2020    right lateral chest wall/axillary mass excision per Dr. Jessica Herron at [facility]  2004    COLONOSCOPY  2011    COLONOSCOPY  04/20/2017    Normal    COLONOSCOPY N/A 8/12/2021    COLONOSCOPY WITH BIOPSY performed by Lyssa Gonzalez MD at [address]  40+ yr ago    after miscarriage     FINGER SURGERY Left 2014    index finger - removed a nodule    FOREARM SURGERY Right 1998     fx  repair with pin placement    HAND TENDON SURGERY  2012    Tendon removed left arm placed left thumb    HEEL SPUR SURGERY Bilateral right    done in 2015 and right done 2013    HYSTERECTOMY (CERVIX STATUS UNKNOWN)  age 45    \"took ovaries and appendix also\"    JOINT REPLACEMENT  right    knee- done 2011    JOINT REPLACEMENT  left    knee- done 12/2016    MASTECTOMY, RADICAL Right 1972    took one lymph node and was positive    OTHER SURGICAL HISTORY  04/27/2017    Thyroid biopsy    OTHER SURGICAL HISTORY Right 1982    right axillary mass bx - breast CA recurrence    SKIN BIOPSY      TUNNELED VENOUS PORT PLACEMENT  2013    left side of chest       CURRENTMEDICATIONS       Previous Medications    ACETAMINOPHEN (TYLENOL) 650 MG EXTENDED RELEASE TABLET    Take 650 mg by mouth 3 times daily     ALLOPURINOL (ZYLOPRIM) 100 MG TABLET    Take 100 mg by mouth daily    ANASTROZOLE (ARIMIDEX) 1 MG TABLET    TAKE ONE TABLET BY MOUTH EVERY DAY    ASCORBIC ACID (VITAMIN C) 500 MG CAPS    Take 1 capsule by mouth daily    ATENOLOL (TENORMIN) 25 MG TABLET    Take 12.5 mg by mouth daily     B COMPLEX-C (SUPER B COMPLEX PO)    Take 1 tablet by mouth daily    BISMUTH SUBSALICYLATE (PEPTO BISMOL) 262 MG/15ML SUSPENSION    Take 15 mLs by mouth daily as needed. CALCIUM CARBONATE 600 MG TABS TABLET    Take 1 tablet by mouth daily    CHLORPHENIRAMINE MALEATE (ALLERGY RELIEF PO)    Take 1 tablet by mouth daily    CHOLECALCIFEROL (VITAMIN D3 PO)    Take 1 tablet by mouth daily    FENOFIBRATE (TRICOR) 54 MG TABLET    Take 54 mg by mouth daily s [facility] pharmacy: Please dispense generic fenofibrate unless prescriber denote    FUROSEMIDE (LASIX) 20 MG TABLET    TAKE TWO TABLETS BY MOUTH EVERY MORNING AND ONE TABLET EVERY EVENING    HYDROCHLOROTHIAZIDE (HYDRODIURIL) 25 MG TABLET    Take 25 mg by mouth daily    LOPERAMIDE (IMODIUM A-D) 2 MG TABLET    take 2 tablet by oral route after 1st loose stool and 1 tablet (2 mg) after each next bowel movement; do not exceed 16 mg in 24hrs    LOTEMAX SM 0.38 % GEL    Instill 1 drop into both eyes every day    NONFORMULARY    Relief factor 3 packs qd    OMEPRAZOLE (PRILOSEC) 20 MG CAPSULE    Take 20 mg by mouth daily.       POTASSIUM CHLORIDE (KLOR-CON M) 20 MEQ EXTENDED RELEASE TABLET    TAKE ONE TABLET BY MOUTH DAILY    PROBIOTIC PRODUCT (PROBIOTIC-10 PO)    Take by mouth    SPIRONOLACTONE (ALDACTONE) 50 MG TABLET    Take 1 tablet by mouth daily    VITAMIN A PO    Take by mouth       ALLERGIES     Grass extracts [gramineae pollens], Adhesive tape, Antivert [meclizine hcl], Atorvastatin calcium, Meclizine, Meclizine hcl, and Monascus purpureus went yeast    FAMILYHISTORY       Family History   Problem Relation Age of Onset    Emphysema Mother     Heart Disease Mother     Heart Disease Father         MI    Liver Disease Sister     Depression Sister     Heart Disease Sister Cancer Paternal Aunt     Cancer Paternal Uncle     Cancer Maternal Grandmother         SOCIAL HISTORY       Social History     Socioeconomic History    Marital status:    Tobacco Use    Smoking status: Never    Smokeless tobacco: Never   Vaping Use    Vaping Use: Never used   Substance and Sexual Activity    Alcohol use: No    Drug use: No       SCREENINGS    [place] Coma Scale  Eye Opening: Spontaneous  Best Verbal Response: Oriented  Best Motor Response: Obeys commands  [place] Coma Scale Score: 15      PHYSICAL EXAM       ED Triage Vitals   BP Temp Temp Source Heart Rate Resp SpO2 Height Weight   03/09/23 1332 03/09/23 1335 03/09/23 1335 03/09/23 1332 03/09/23 1332 03/09/23 1332 -- --   (!) 151/75 97.9 °F (36.6 °C) Oral (!) 105 16 99 %        Constitutional:  Well developed, Well nourished. No distress  HENT:  Normocephalic, Atraumatic, PERRL. EOMI. Sclera clear. Conjunctiva normal, No discharge. Oropharynx is within normal limits, no tonsillar hypertrophy white exudate, tolerating secretions. Bilateral TMs are pearly white without signs of significant erythema or effusion. No perforation. No mastoid tenderness. Neck/Lymphatics:  supple, no JVD, no swollen nodes  Cardiovascular:  tachycardic rate, regular rhythm,  no murmurs/rubs/gallops. Respiratory:   Nonlabored breathing. Normal breath sounds, No wheezing  Abdomen: Bowel sounds normal, Soft, No tenderness, no masses. :  No significant flank tenderness to percussion. Musculoskeletal:    RIght sided hip pain to palpation, no leg swelling. There is no edema, asymmetry, or calf / thigh tenderness bilaterally. No cyanosis. No cool or pale-appearing limb. Distal cap refill and pulses intact bilateral upper and lower extremities  Bilateral upper and lower extremity ROM intact without pain or obvious deficit  Integument:   Warm, Dry  Neurologic:  Alert & oriented , No focal deficits noted. Cranial nerves II through XII grossly intact. Normal gross motor coordination & motor strength bilateral upper and lower extremities  Sensation intact. Psychiatric:  Affect normal, Mood normal.     DIAGNOSTIC RESULTS   LABS:    Labs Reviewed   CBC WITH AUTO DIFFERENTIAL - Abnormal; Notable for the following components:       Result Value    RBC 4.11 (*)     .6 (*)     MCH 34.1 (*)     Segs Relative 70.4 (*)     Lymphocytes % 18.8 (*)     Monocytes % 8.6 (*)     Immature Neutrophil % 1.0 (*)     All other components within normal limits   COMPREHENSIVE METABOLIC PANEL - Abnormal; Notable for the following components:    Glucose 105 (*)     Total Protein 6.2 (*)     AST 14 (*)     All other components within normal limits   URINALYSIS - Abnormal; Notable for the following components:    Ketones, Urine TRACE (*)     All other components within normal limits   CULTURE, URINE   TROPONIN   BRAIN NATRIURETIC PEPTIDE   SPECIMEN REJECTION   SPECIMEN REJECTION   D-DIMER, QUANTITATIVE       When ordered, only abnormal lab results are displayed. All other labs were within normal range or not returned as of this dictation. EKG: When ordered, EKG's are interpreted by the Emergency Department Physician in the absence of a cardiologist.  Please see their note for interpretation of EKG. RADIOLOGY:   Non-plain film images such as CT, Ultrasound and MRI are read by the radiologist. Plain radiographic images are visualized and preliminarily interpreted by the  ED Provider with the below findings:    Interpretation perthe Radiologist below, if available at the time of this note:    XR CHEST PORTABLE   Final Result   1. Mild pulmonary vascular congestion. 2.  Left basilar atelectasis or infiltrate.            XR CHEST PORTABLE    Result Date: 3/9/2023  EXAMINATION: ONE XRAY VIEW OF THE CHEST 3/9/2023 2:02 pm COMPARISON: December 14, 2020 HISTORY: ORDERING SYSTEM PROVIDED HISTORY: hypertension TECHNOLOGIST PROVIDED HISTORY: Reason for exam:->hypertension Reason for Exam: chest pain Additional signs and symptoms: na Relevant Medical/Surgical History: na FINDINGS: Stable cardiomediastinal silhouette. Mild pulmonary vascular congestion is noted. Left basilar atelectasis or infiltrate is noted. There is no pleural effusion or pneumothorax. Punctate radiopaque densities overlying the chest are likely external.  The osseous structures are stable. 1.  Mild pulmonary vascular congestion. 2.  Left basilar atelectasis or infiltrate. PROCEDURES   Unless otherwise noted below, none    CRITICAL CARE   CRITICAL CARE NOTE:   N/A    CONSULTS:  IP CONSULT TO CASE MANAGEMENT    EMERGENCY DEPARTMENT COURSE and MDM:   Vitals:    Vitals:    03/09/23 1332 03/09/23 1335   BP: (!) 151/75    Pulse: (!) 105    Resp: 16    Temp:  97.9 °F (36.6 °C)   TempSrc:  Oral   SpO2: 99%        Patient was given thefollowing medications:  Medications   fentaNYL (SUBLIMAZE) injection 50 mcg (50 mcg IntraVENous Given 3/9/23 1444)   0.9 % sodium chloride bolus (0 mLs IntraVENous Stopped 3/9/23 1602)   ketorolac (TORADOL) injection 15 mg (15 mg IntraVENous Given 3/9/23 1447)         Is this patient to be included in the SEP-1 Core Measure due to severe sepsis or septic shock? No   Exclusion criteria - the patient is NOT to be included for SEP-1 Core Measure due to: Infection is not suspected    MDM:    CC/HPI Summary, DDx, ED Course, and Reassessment:     Patient presents as above. Emergent etiologies considered. Patient seen and examined. Work-up initiated secondary to presentation, physical exam findings, vital signs and medical chart review. In brief, 24-year-old female presenting to the emergency department today sent in by physical therapy for high blood pressure readings and tachycardia after having pain into her right sided at the start of physical therapy.   She does mention that she is having some left arm numbness and tingling but no obvious chest pain no shortness of breath no dyspnea on exertion, no palpitations no abdominal pain. Patient contributes her abnormal vital signs to her pain. Patient is slightly hypertensive and tachycardic on arrival not having any chest pain. IS interacting appropriately but does verbalize concern for possible urinary tract infection    Patient given IV fluids, IV pain medication, aspirin work-up initiated. Initial EKG and cardiac enzymes within normal limits. CXR negative, awaiting D dimer at signout    History from : Patient    Limitations to history : None    Patient was given the following medications:  Medications   fentaNYL (SUBLIMAZE) injection 50 mcg (50 mcg IntraVENous Given 3/9/23 1444)   0.9 % sodium chloride bolus (0 mLs IntraVENous Stopped 3/9/23 1602)   ketorolac (TORADOL) injection 15 mg (15 mg IntraVENous Given 3/9/23 1447)         ________________________________________________________________________     I have signed out [facility] Emergency Department care to [place]. .  We discussed the history, physical exam, completed/pending test results (if obtained) and current treatment plan. At time of patient signout Lab work, d dimer and final disposition pending.   - If  negative for abnormality or obvious etiology, patient will be discharged with close outpatient follow-up and return to emergency department warning signs.   - If Lab or work up reveals any abnormalities, these will be addressed by [place]I [place] and supervising M.D. In this case, see his/her note for further details, remaining Emergency Department course, final disposition and diagnosis.     ________________________________________________________________________          GALINA Wynn  03/09/23 2124 [address] [place] [address], [place]  03/10/23 5911

## 2023-03-09 NOTE — ED PROVIDER NOTES
Emergency Department Encounter  Location: [address] [place] [address] EMERGENCY DEPARTMENT    Patient: Talat García  MRN: [id number]  : 1945  Date of evaluation: 3/9/2023  ED Provider: Elisabeth Hermosillo PA-C    [id number]  Talat García was checked out to me by   BETI falcon. Please see his/her initial documentation for details of the patient's initial ED presentation, physical exam and completed studies. In brief, Talat García is a 68 y.o. female that presented to the emergency department for evaluation for htn, tachycardia, and L sided arm pain and chronic lowr back pain. Pt labs including cbc, and metabolic panel and trop back and negative. Asa ordered. CTA pending. Cxr whos atelectasis vs infiltrate. GENERAL APPEARANCE: Awake and alert. Cooperative. No acute distress. HEAD: Normocephalic. Atraumatic. EYES: EOM's grossly intact. Sclera anicteric. ENT: Mucous membranes are moist. Tolerates saliva. No trismus. NECK: Supple. No meningismus. Trachea midline. HEART: RRR. Radial pulses 2+. LUNGS: Respirations unlabored. CTAB  ABDOMEN: Soft. Non-tender. No guarding or rebound. EXTREMITIES: No acute deformities. SKIN: Warm and dry. NEUROLOGICAL: No gross facial drooping. Moves all 4 extremities spontaneously. PSYCHIATRIC: Normal mood.       I have reviewed and interpreted all of the currently available lab results and diagnostics from this visit:  Results for orders placed or performed during the hospital encounter of 23   CBC with Auto Differential   Result Value Ref Range    WBC 5.8 4.0 - 10.5 K/CU MM    RBC 4.11 (L) 4.2 - 5.4 M/CU MM    Hemoglobin 14.0 12.5 - 16.0 GM/DL    Hematocrit 43.4 37 - 47 %    .6 (H) 78 - 100 FL    MCH 34.1 (H) 27 - 31 PG    MCHC 32.3 32.0 - 36.0 %    RDW 12.5 11.7 - 14.9 %    Platelets 962 704 - 765 K/CU MM    MPV 9.5 7.5 - 11.1 FL    Differential Type AUTOMATED DIFFERENTIAL     Segs Relative 70.4 (H) 36 - 66 %    Lymphocytes % 18.8 (L) 24 - 44 %    Monocytes % 8.6 (H) 0 - 4 %    Eosinophils % 0.7 0 - 3 %    Basophils % 0.5 0 - 1 %    Segs Absolute 4.1 K/CU MM    Lymphocytes Absolute 1.1 K/CU MM    Monocytes Absolute 0.5 K/CU MM    Eosinophils Absolute 0.0 K/CU MM    Basophils Absolute 0.0 K/CU MM    Nucleated RBC % 0.0 %    Total Nucleated RBC 0.0 K/CU MM    Total Immature Neutrophil 0.06 K/CU MM    Immature Neutrophil % 1.0 (H) 0 - 0.43 %   Comprehensive Metabolic Panel   Result Value Ref Range    Sodium 142 135 - 145 MMOL/L    Potassium 4.1 3.5 - 5.1 MMOL/L    Chloride 106 99 - 110 mMol/L    CO2 25 21 - 32 MMOL/L    BUN 22 6 - 23 MG/DL    Creatinine 0.8 0.6 - 1.1 MG/DL    Est, Glom Filt Rate >60 >60 mL/min/1.73m2    Glucose 105 (H) 70 - 99 MG/DL    Calcium 10.0 8.3 - 10.6 MG/DL    Albumin 4.4 3.4 - 5.0 GM/DL    Total Protein 6.2 (L) 6.4 - 8.2 GM/DL    Total Bilirubin 0.2 0.0 - 1.0 MG/DL    ALT 14 10 - 40 U/L    AST 14 (L) 15 - 37 IU/L    Alkaline Phosphatase 66 40 - 129 IU/L    Anion Gap 11 4 - 16   Troponin   Result Value Ref Range    Troponin T <0.010 <0.01 NG/ML   Brain Natriuretic Peptide   Result Value Ref Range    Pro-.5 <300 PG/ML   Urinalysis   Result Value Ref Range    Color, UA YELLOW YELLOW    Clarity, UA CLEAR CLEAR    Glucose, Urine NEGATIVE NEGATIVE MG/DL    Bilirubin Urine NEGATIVE NEGATIVE MG/DL    Ketones, Urine TRACE (A) NEGATIVE MG/DL    Specific Gravity, UA 1.025 1.001 - 1.035    Blood, Urine NEGATIVE NEGATIVE    pH, Urine 5.0 5.0 - 8.0    Protein, UA NEGATIVE NEGATIVE MG/DL    Urobilinogen, Urine 0.2 0.2 - 1.0 MG/DL    Nitrite Urine, Quantitative NEGATIVE NEGATIVE    Leukocyte Esterase, Urine NEGATIVE NEGATIVE    Urinalysis Comments       Microscopic exam not performed based on chemical results unless requested in original order.    SPECIMEN REJECTION   Result Value Ref Range    Rejected Test DIMER     Reason for Rejection UNABLE TO PERFORM TESTING:    SPECIMEN REJECTION   Result Value Ref Range    Rejected Test DIMER Reason for Rejection UNABLE TO PERFORM TESTING:    EKG 12 Lead   Result Value Ref Range    Ventricular Rate 100 BPM    Atrial Rate 100 BPM    P-R Interval 198 ms    QRS Duration 76 ms    Q-T Interval 336 ms    QTc Calculation (Bazett) 433 ms    P Axis 56 degrees    R Axis 29 degrees    T Axis 49 degrees    Diagnosis       Normal sinus rhythm  Normal ECG  When compared with ECG of 14-DEC-2020 09:12,  Nonspecific T wave abnormality no longer evident in Inferior leads       XR CHEST PORTABLE    Result Date: 3/9/2023  EXAMINATION: ONE XRAY VIEW OF THE CHEST 3/9/2023 2:02 pm COMPARISON: December 14, 2020 HISTORY: ORDERING SYSTEM PROVIDED HISTORY: hypertension TECHNOLOGIST PROVIDED HISTORY: Reason for exam:->hypertension Reason for Exam: chest pain Additional signs and symptoms: na Relevant Medical/Surgical History: na FINDINGS: Stable cardiomediastinal silhouette. Mild pulmonary vascular congestion is noted. Left basilar atelectasis or infiltrate is noted. There is no pleural effusion or pneumothorax. Punctate radiopaque densities overlying the chest are likely external.  The osseous structures are stable. 1.  Mild pulmonary vascular congestion. 2.  Left basilar atelectasis or infiltrate. CTA PULMONARY W CONTRAST    Result Date: 3/10/2023  EXAMINATION: CTA OF THE CHEST 3/10/2023 12:17 am TECHNIQUE: CTA of the chest was performed after the administration of intravenous contrast.  Multiplanar reformatted images are provided for review. MIP images are provided for review. Automated exposure control, iterative reconstruction, and/or weight based adjustment of the mA/kV was utilized to reduce the radiation dose to as low as reasonably achievable.  COMPARISON: PET CT of 07/16/2020 HISTORY: ORDERING SYSTEM PROVIDED HISTORY: tachycardia, abdnormal cxr TECHNOLOGIST PROVIDED HISTORY: Reason for exam:->tachycardia, abdnormal cxr Decision Support Exception - unselect if not a suspected or confirmed emergency medical condition->Emergency Medical Condition (MA) Reason for Exam: tachycardia, abdnormal cxr FINDINGS: Pulmonary Arteries: Bolus timing is acceptable. There are some areas of respiratory motion artifact degrading the small distal branches but no convincing evidence of pulmonary arterial embolism. The main pulmonary arteries are not significantly enlarged. . Mediastinum: No thoracic aortic aneurysm or dissection. The cardiac chambers are not enlarged and no pericardial effusion. Densely calcified lymph nodes in the precarinal, subcarinal, and right hilar region. The noncalcified mediastinal lymph nodes are not enlarged. Lungs/pleura: Densely calcified granulomas in the right mid chest.  Some areas of heterogenous ventilation/air trapping in the lungs. Some scattered atelectasis and motion artifact as well. No focal consolidation to indicate pneumonia. There is no pleural effusion or pneumothorax. Upper Abdomen: No acute process is seen in the upper abdomen. Previous cholecystectomy has been performed. The included portion of the adrenal glands are not enlarged. There is a small hiatal hernia with mild thickening of the distal esophagus and GE junction. Soft Tissues/Bones: Previous right mastectomy. No acute fractures are seen at the ribs. There is no soft tissue emphysema. No collapse or subluxation at the thoracic spine. The sternum is intact. 1.  Some motion artifact but no convincing evidence of pulmonary arterial embolism. 2.  Some scattered atelectasis and sequela of old granulomatous disease. No pneumonia, pleural effusion, or pneumothorax. 3.  Small hiatal hernia with mild thickening of the GE junction and distal esophagus. Correlate for reflux esophagitis. Final ED Course and MDM: In brief, Alvin Ayala is a 68 y.o. female whose care was signed out to me by the outgoing provider. In brief, presents today to the emergency department. CT angiogram reveals no evidence of pulmonary emboli. There is mild pulmonary vascular congestion. No evidence of pneumonia. Or pleural effusion or pneumothorax. Vital signs are little tachycardic upon arrival here in the ED. Tachycardia does resolve with normal saline. Fentanyl, aspirin, Toradol provided for patient's pain patient was significantly better. Will discharge home. Major labs including CBC metabolic panel revealed no acute abnormality particularly no abnormality requiring emergent intervention here in the emergency department. ED Medication Orders (From admission, onward)      Start Ordered     Status Ordering Provider    03/10/23 0130 03/10/23 0129  oxyCODONE-acetaminophen (PERCOCET) 5-325 MG per tablet 1 tablet  ONCE         Last MAR action: Given - by Garth Fontanez on 03/10/23 at [facility] [facility] A    03/10/23 0030 03/10/23 0021  sodium chloride flush 0.9 % injection 5-40 mL  2 TIMES DAILY         Acknowledged NILA BALLESTEROS    03/10/23 0021 03/10/23 0021  iopamidol (ISOVUE-370) 76 % injection 75 mL  IMG ONCE PRN         Last MAR action: Given - by Essence De La Fuente on 03/10/23 at [address] [facility], [place] A    03/09/23 1830 03/09/23 1826  aspirin chewable tablet 324 mg  ONCE         Last MAR action: Given - by Karin Bowen on 03/09/23 at [address] A    03/09/23 1415 03/09/23 1400  fentaNYL (SUBLIMAZE) injection 50 mcg  ONCE         Last MAR action: Given - by Karin Bowen on 03/09/23 at [facility]    03/09/23 1415 03/09/23 1400  0.9 % sodium chloride bolus  ONCE         Last MAR action: Stopped - by Karin Bowen on 03/09/23 [facility]    03/09/23 1415 03/09/23 1402  ketorolac (TORADOL) injection 15 mg  ONCE         Last MAR action: Given - by Karin Bowen on 03/09/23 at [address] [facility]            Final Impression      1. Hypertension, unspecified type    2. Acute low back pain, unspecified back pain laterality, unspecified whether sciatica present    3.  Essential tremor        DISPOSITION Decision To Discharge 03/10/2023 01:13:38 AM     (Please note that portions of this note may have been completed with a voice recognition program. Efforts were made to edit the dictations but occasionally words are mis-transcribed.)    Dg Cash, [address], [place]  03/10/23 [id number]

## 2023-03-10 ENCOUNTER — APPOINTMENT (OUTPATIENT)
Dept: CT IMAGING | Age: 78
End: 2023-03-10
Payer: MEDICARE

## 2023-03-10 VITALS
SYSTOLIC BLOOD PRESSURE: 140 MMHG | OXYGEN SATURATION: 97 % | DIASTOLIC BLOOD PRESSURE: 72 MMHG | RESPIRATION RATE: 16 BRPM | TEMPERATURE: 97.9 F | HEART RATE: 97 BPM

## 2023-03-10 LAB
CULTURE: NORMAL
Lab: NORMAL
SPECIMEN: NORMAL

## 2023-03-10 PROCEDURE — 6360000004 HC RX CONTRAST MEDICATION: Performed by: PHYSICIAN ASSISTANT

## 2023-03-10 PROCEDURE — 71275 CT ANGIOGRAPHY CHEST: CPT

## 2023-03-10 PROCEDURE — 6370000000 HC RX 637 (ALT 250 FOR IP): Performed by: PHYSICIAN ASSISTANT

## 2023-03-10 RX ORDER — OXYCODONE HYDROCHLORIDE AND ACETAMINOPHEN 5; 325 MG/1; MG/1
1 TABLET ORAL ONCE
Status: COMPLETED | OUTPATIENT
Start: 2023-03-10 | End: 2023-03-10

## 2023-03-10 RX ORDER — OXYCODONE HYDROCHLORIDE AND ACETAMINOPHEN 5; 325 MG/1; MG/1
1-2 TABLET ORAL EVERY 4 HOURS PRN
Qty: 15 TABLET | Refills: 0 | Status: SHIPPED | OUTPATIENT
Start: 2023-03-10 | End: 2023-03-13

## 2023-03-10 RX ORDER — SODIUM CHLORIDE 0.9 % (FLUSH) 0.9 %
5-40 SYRINGE (ML) INJECTION 2 TIMES DAILY
Status: DISCONTINUED | OUTPATIENT
Start: 2023-03-10 | End: 2023-03-10 | Stop reason: HOSPADM

## 2023-03-10 RX ADMIN — IOPAMIDOL 75 ML: 755 INJECTION, SOLUTION INTRAVENOUS at 00:22

## 2023-03-10 RX ADMIN — OXYCODONE AND ACETAMINOPHEN 1 TABLET: 5; 325 TABLET ORAL at 01:46

## 2023-03-10 ASSESSMENT — PAIN SCALES - GENERAL: PAINLEVEL_OUTOF10: 7

## 2023-03-10 ASSESSMENT — PAIN DESCRIPTION - ORIENTATION: ORIENTATION: RIGHT

## 2023-03-10 ASSESSMENT — PAIN DESCRIPTION - LOCATION: LOCATION: HIP;BACK;KNEE

## 2023-03-10 NOTE — ED NOTES
Discharge instructions and all medications reviewed pt vebalized understanding. No further questions noted at this time. pt's breathing even and unlabored.      Jeff Daily RN  03/10/23 [id number]

## 2023-03-10 NOTE — CONSULTS
Consult completed. Procedure/rationale explained to pt including rationale. #20ga 8cm Arrow Endurance Extended Dwell PIV initiated to LUE anterior distal Basilic Vein using sterile, UltraSound-guided technique. 3M Tegaderm with CHG sterile dressing with SkinPrep, Limb alert, & SwabCap applied. Pt tolerated well. Line may be used through 4/10/2023. US visualization of catheter within vessel lumen. Primary RN notified.

## 2023-03-10 NOTE — ED PROVIDER NOTES
ED attending EKG interpretation (I otherwise did not participate in the care of this patient)    EKG Interpretation  Interpreted by me  Compared to 12/14/2020  Rhythm: normal sinus   Rate: normal 77  Axis: normal  Ectopy: none  Conduction: normal  ST Segments: no acute change  T Waves: no acute change  Clinical Impression: normal sinus rhythm     Yohan Plasencia MD  03/09/23 4338

## 2023-03-11 LAB
EKG ATRIAL RATE: 100 BPM
EKG DIAGNOSIS: NORMAL
EKG P AXIS: 56 DEGREES
EKG P-R INTERVAL: 198 MS
EKG Q-T INTERVAL: 336 MS
EKG QRS DURATION: 76 MS
EKG QTC CALCULATION (BAZETT): 433 MS
EKG R AXIS: 29 DEGREES
EKG T AXIS: 49 DEGREES
EKG VENTRICULAR RATE: 100 BPM

## 2023-03-11 PROCEDURE — 93010 ELECTROCARDIOGRAM REPORT: CPT | Performed by: INTERNAL MEDICINE

## 2023-06-05 ENCOUNTER — OFFICE VISIT (OUTPATIENT)
Dept: ONCOLOGY | Age: 78
End: 2023-06-05
Payer: MEDICARE

## 2023-06-05 ENCOUNTER — HOSPITAL ENCOUNTER (OUTPATIENT)
Dept: INFUSION THERAPY | Age: 78
Discharge: HOME | End: 2023-06-05
Payer: MEDICARE

## 2023-06-05 VITALS
OXYGEN SATURATION: 98 % | TEMPERATURE: 96.8 F | BODY MASS INDEX: 33.97 KG/M2 | HEART RATE: 70 BPM | DIASTOLIC BLOOD PRESSURE: 56 MMHG | HEIGHT: 69 IN | SYSTOLIC BLOOD PRESSURE: 132 MMHG

## 2023-06-05 DIAGNOSIS — C50.411 MALIGNANT NEOPLASM OF UPPER-OUTER QUADRANT OF RIGHT BREAST IN FEMALE, ESTROGEN RECEPTOR POSITIVE (HCC): Primary | ICD-10-CM

## 2023-06-05 DIAGNOSIS — E05.00 GRAVES DISEASE: ICD-10-CM

## 2023-06-05 DIAGNOSIS — C79.89 CHEST WALL RECURRENCE OF BREAST CANCER, RIGHT (HCC): ICD-10-CM

## 2023-06-05 DIAGNOSIS — Z17.0 MALIGNANT NEOPLASM OF UPPER-OUTER QUADRANT OF RIGHT BREAST IN FEMALE, ESTROGEN RECEPTOR POSITIVE (HCC): Primary | ICD-10-CM

## 2023-06-05 DIAGNOSIS — C50.411 MALIGNANT NEOPLASM OF UPPER-OUTER QUADRANT OF RIGHT BREAST IN FEMALE, ESTROGEN RECEPTOR POSITIVE (HCC): ICD-10-CM

## 2023-06-05 DIAGNOSIS — C50.911 CHEST WALL RECURRENCE OF BREAST CANCER, RIGHT (HCC): ICD-10-CM

## 2023-06-05 DIAGNOSIS — Z17.0 MALIGNANT NEOPLASM OF UPPER-OUTER QUADRANT OF RIGHT BREAST IN FEMALE, ESTROGEN RECEPTOR POSITIVE (HCC): ICD-10-CM

## 2023-06-05 LAB
ALBUMIN SERPL-MCNC: 4.6 GM/DL (ref 3.4–5)
ALP BLD-CCNC: 81 IU/L (ref 40–128)
ALT SERPL-CCNC: 15 U/L (ref 10–40)
ANION GAP SERPL CALCULATED.3IONS-SCNC: 14 MMOL/L (ref 4–16)
AST SERPL-CCNC: 16 IU/L (ref 15–37)
BASOPHILS ABSOLUTE: 0 K/CU MM
BASOPHILS RELATIVE PERCENT: 0.3 % (ref 0–1)
BILIRUB SERPL-MCNC: 0.3 MG/DL (ref 0–1)
BUN SERPL-MCNC: 24 MG/DL (ref 6–23)
CALCIUM SERPL-MCNC: 10.3 MG/DL (ref 8.3–10.6)
CHLORIDE BLD-SCNC: 104 MMOL/L (ref 99–110)
CO2: 24 MMOL/L (ref 21–32)
CREAT SERPL-MCNC: 0.7 MG/DL (ref 0.6–1.1)
DIFFERENTIAL TYPE: ABNORMAL
EOSINOPHILS ABSOLUTE: 0.1 K/CU MM
EOSINOPHILS RELATIVE PERCENT: 2.2 % (ref 0–3)
GFR SERPL CREATININE-BSD FRML MDRD: >60 ML/MIN/1.73M2
GLUCOSE SERPL-MCNC: 83 MG/DL (ref 70–99)
HCT VFR BLD CALC: 43.9 % (ref 37–47)
HEMOGLOBIN: 14.9 GM/DL (ref 12.5–16)
LYMPHOCYTES ABSOLUTE: 1.1 K/CU MM
LYMPHOCYTES RELATIVE PERCENT: 17.6 % (ref 24–44)
MCH RBC QN AUTO: 35.8 PG (ref 27–31)
MCHC RBC AUTO-ENTMCNC: 33.9 % (ref 32–36)
MCV RBC AUTO: 105.5 FL (ref 78–100)
MONOCYTES ABSOLUTE: 0.5 K/CU MM
MONOCYTES RELATIVE PERCENT: 8 % (ref 0–4)
PDW BLD-RTO: 12.4 % (ref 11.7–14.9)
PLATELET # BLD: 207 K/CU MM (ref 140–440)
PMV BLD AUTO: 9.9 FL (ref 7.5–11.1)
POTASSIUM SERPL-SCNC: 4.6 MMOL/L (ref 3.5–5.1)
RBC # BLD: 4.16 M/CU MM (ref 4.2–5.4)
SEGMENTED NEUTROPHILS ABSOLUTE COUNT: 4.7 K/CU MM
SEGMENTED NEUTROPHILS RELATIVE PERCENT: 71.9 % (ref 36–66)
SODIUM BLD-SCNC: 142 MMOL/L (ref 135–145)
TOTAL PROTEIN: 6.6 GM/DL (ref 6.4–8.2)
WBC # BLD: 6.5 K/CU MM (ref 4–10.5)

## 2023-06-05 PROCEDURE — 99211 OFF/OP EST MAY X REQ PHY/QHP: CPT

## 2023-06-05 PROCEDURE — 1123F ACP DISCUSS/DSCN MKR DOCD: CPT | Performed by: INTERNAL MEDICINE

## 2023-06-05 PROCEDURE — 80053 COMPREHEN METABOLIC PANEL: CPT

## 2023-06-05 PROCEDURE — 99214 OFFICE O/P EST MOD 30 MIN: CPT | Performed by: INTERNAL MEDICINE

## 2023-06-05 PROCEDURE — 85025 COMPLETE CBC W/AUTO DIFF WBC: CPT

## 2023-06-05 PROCEDURE — 86300 IMMUNOASSAY TUMOR CA 15-3: CPT

## 2023-06-05 PROCEDURE — 36415 COLL VENOUS BLD VENIPUNCTURE: CPT

## 2023-06-05 RX ORDER — GABAPENTIN 100 MG/1
100 CAPSULE ORAL 3 TIMES DAILY
COMMUNITY
Start: 2023-04-11

## 2023-06-05 RX ORDER — BUSPIRONE HYDROCHLORIDE 5 MG/1
5 TABLET ORAL 2 TIMES DAILY
COMMUNITY
Start: 2023-04-27

## 2023-06-05 RX ORDER — ANASTROZOLE 1 MG/1
1 TABLET ORAL DAILY
Qty: 90 TABLET | Refills: 5 | Status: SHIPPED | OUTPATIENT
Start: 2023-06-05 | End: 2024-11-26

## 2023-06-05 NOTE — PROGRESS NOTES
MA Rooming Questions  Patient: Amaris Both  MRN: [id number]    Date: 6/5/2023        1. Do you have any new issues?   no         2. Do you need any refills on medications?    no    3. Have you had any imaging done since your last visit?   no    4. Have you been hospitalized or seen in the emergency room since your last visit here?   no    5. Did the patient have a depression screening completed today?  No    No data recorded     PHQ-9 Given to (if applicable):               PHQ-9 Score (if applicable):                     [] Positive     []  Negative              Does question #9 need addressed (if applicable)                     [] Yes    []  No               Sully Pickett CMA

## 2023-06-08 LAB — CANCER AG27-29 SERPL-ACNC: 10.6 U/ML

## 2023-08-17 ENCOUNTER — HOSPITAL ENCOUNTER (OUTPATIENT)
Dept: WOMENS IMAGING | Age: 78
Discharge: HOME | End: 2023-08-17
Attending: INTERNAL MEDICINE
Payer: MEDICARE

## 2023-08-17 ENCOUNTER — HOSPITAL ENCOUNTER (OUTPATIENT)
Dept: ULTRASOUND IMAGING | Age: 78
End: 2023-08-17
Attending: INTERNAL MEDICINE
Payer: MEDICARE

## 2023-08-17 VITALS — BODY MASS INDEX: 26.16 KG/M2 | WEIGHT: 157 LBS | HEIGHT: 65 IN

## 2023-08-17 DIAGNOSIS — Z17.0 MALIGNANT NEOPLASM OF UPPER-OUTER QUADRANT OF RIGHT BREAST IN FEMALE, ESTROGEN RECEPTOR POSITIVE (HCC): ICD-10-CM

## 2023-08-17 DIAGNOSIS — Z78.0 MENOPAUSE: ICD-10-CM

## 2023-08-17 DIAGNOSIS — C50.411 MALIGNANT NEOPLASM OF UPPER-OUTER QUADRANT OF RIGHT BREAST IN FEMALE, ESTROGEN RECEPTOR POSITIVE (HCC): ICD-10-CM

## 2023-08-17 PROCEDURE — 77080 DXA BONE DENSITY AXIAL: CPT

## 2023-08-17 PROCEDURE — G0279 TOMOSYNTHESIS, MAMMO: HCPCS

## 2023-08-22 RX ORDER — IBUPROFEN 200 MG
1 CAPSULE ORAL 2 TIMES DAILY
Qty: 60 TABLET | Refills: 3 | Status: SHIPPED | OUTPATIENT
Start: 2023-08-22 | End: 2023-08-22

## 2023-08-22 NOTE — PROGRESS NOTES
Called patient @ [phone] and encouraged to take calcium and Vit D supplements. Patient already takes Vit D3 1,000 IU daily. Patient advised to start taking calcium 600 mg BID. Patient voices understanding and states she will  OTC. No further needs addressed at this time.

## 2023-09-14 ENCOUNTER — HOSPITAL ENCOUNTER (OUTPATIENT)
Dept: INFUSION THERAPY | Age: 78
Discharge: HOME | End: 2023-09-14
Payer: MEDICARE

## 2023-09-14 DIAGNOSIS — Z17.0 MALIGNANT NEOPLASM OF UPPER-OUTER QUADRANT OF RIGHT BREAST IN FEMALE, ESTROGEN RECEPTOR POSITIVE (HCC): ICD-10-CM

## 2023-09-14 DIAGNOSIS — E05.00 GRAVES DISEASE: ICD-10-CM

## 2023-09-14 DIAGNOSIS — C79.89 CHEST WALL RECURRENCE OF BREAST CANCER, RIGHT (HCC): ICD-10-CM

## 2023-09-14 DIAGNOSIS — C50.411 MALIGNANT NEOPLASM OF UPPER-OUTER QUADRANT OF RIGHT BREAST IN FEMALE, ESTROGEN RECEPTOR POSITIVE (HCC): ICD-10-CM

## 2023-09-14 DIAGNOSIS — C50.911 CHEST WALL RECURRENCE OF BREAST CANCER, RIGHT (HCC): ICD-10-CM

## 2023-09-14 LAB
ALBUMIN SERPL-MCNC: 4.4 GM/DL (ref 3.4–5)
ALP BLD-CCNC: 81 IU/L (ref 40–129)
ALT SERPL-CCNC: 14 U/L (ref 10–40)
ANION GAP SERPL CALCULATED.3IONS-SCNC: 9 MMOL/L (ref 4–16)
AST SERPL-CCNC: 15 IU/L (ref 15–37)
BASOPHILS ABSOLUTE: 0 K/CU MM
BASOPHILS RELATIVE PERCENT: 0.3 % (ref 0–1)
BILIRUB SERPL-MCNC: 0.3 MG/DL (ref 0–1)
BUN SERPL-MCNC: 15 MG/DL (ref 6–23)
CALCIUM SERPL-MCNC: 10.3 MG/DL (ref 8.3–10.6)
CHLORIDE BLD-SCNC: 104 MMOL/L (ref 99–110)
CO2: 27 MMOL/L (ref 21–32)
CREAT SERPL-MCNC: 0.7 MG/DL (ref 0.6–1.1)
DIFFERENTIAL TYPE: ABNORMAL
EOSINOPHILS ABSOLUTE: 0.2 K/CU MM
EOSINOPHILS RELATIVE PERCENT: 2.3 % (ref 0–3)
GFR SERPL CREATININE-BSD FRML MDRD: >60 ML/MIN/1.73M2
GLUCOSE SERPL-MCNC: 117 MG/DL (ref 70–99)
HCT VFR BLD CALC: 40.4 % (ref 37–47)
HEMOGLOBIN: 13.8 GM/DL (ref 12.5–16)
LYMPHOCYTES ABSOLUTE: 0.9 K/CU MM
LYMPHOCYTES RELATIVE PERCENT: 13.9 % (ref 24–44)
MCH RBC QN AUTO: 35.3 PG (ref 27–31)
MCHC RBC AUTO-ENTMCNC: 34.2 % (ref 32–36)
MCV RBC AUTO: 103.3 FL (ref 78–100)
MONOCYTES ABSOLUTE: 0.7 K/CU MM
MONOCYTES RELATIVE PERCENT: 9.8 % (ref 0–4)
PDW BLD-RTO: 12.8 % (ref 11.7–14.9)
PLATELET # BLD: 247 K/CU MM (ref 140–440)
PMV BLD AUTO: 9.6 FL (ref 7.5–11.1)
POTASSIUM SERPL-SCNC: 3.9 MMOL/L (ref 3.5–5.1)
RBC # BLD: 3.91 M/CU MM (ref 4.2–5.4)
SEGMENTED NEUTROPHILS ABSOLUTE COUNT: 4.9 K/CU MM
SEGMENTED NEUTROPHILS RELATIVE PERCENT: 73.7 % (ref 36–66)
SODIUM BLD-SCNC: 140 MMOL/L (ref 135–145)
TOTAL PROTEIN: 6.4 GM/DL (ref 6.4–8.2)
WBC # BLD: 6.6 K/CU MM (ref 4–10.5)

## 2023-09-14 PROCEDURE — 86300 IMMUNOASSAY TUMOR CA 15-3: CPT

## 2023-09-14 PROCEDURE — 85025 COMPLETE CBC W/AUTO DIFF WBC: CPT

## 2023-09-14 PROCEDURE — 36415 COLL VENOUS BLD VENIPUNCTURE: CPT

## 2023-09-14 PROCEDURE — 80053 COMPREHEN METABOLIC PANEL: CPT

## 2023-09-14 PROCEDURE — 99211 OFF/OP EST MAY X REQ PHY/QHP: CPT

## 2023-09-16 LAB — CANCER AG27-29 SERPL-ACNC: 15.9 U/ML

## 2023-09-22 ENCOUNTER — OFFICE VISIT (OUTPATIENT)
Dept: ONCOLOGY | Age: 78
End: 2023-09-22

## 2023-09-22 DIAGNOSIS — K14.8 TONGUE LESION: ICD-10-CM

## 2023-09-22 DIAGNOSIS — C50.911 CHEST WALL RECURRENCE OF BREAST CANCER, RIGHT (HCC): ICD-10-CM

## 2023-09-22 DIAGNOSIS — C50.411 MALIGNANT NEOPLASM OF UPPER-OUTER QUADRANT OF RIGHT BREAST IN FEMALE, ESTROGEN RECEPTOR POSITIVE (HCC): Primary | ICD-10-CM

## 2023-09-22 DIAGNOSIS — Z17.0 MALIGNANT NEOPLASM OF UPPER-OUTER QUADRANT OF RIGHT BREAST IN FEMALE, ESTROGEN RECEPTOR POSITIVE (HCC): Primary | ICD-10-CM

## 2023-09-22 DIAGNOSIS — C79.89 CHEST WALL RECURRENCE OF BREAST CANCER, RIGHT (HCC): ICD-10-CM

## 2023-09-22 DIAGNOSIS — E05.00 GRAVES DISEASE: ICD-10-CM

## 2023-09-22 RX ORDER — BUSPIRONE HYDROCHLORIDE 10 MG/1
10 TABLET ORAL 2 TIMES DAILY
COMMUNITY
Start: 2023-08-31

## 2023-09-22 RX ORDER — METHYLPREDNISOLONE 4 MG/1
TABLET ORAL
COMMUNITY
Start: 2023-09-19

## 2023-09-22 RX ORDER — BENZONATATE 100 MG/1
CAPSULE ORAL
COMMUNITY
Start: 2023-09-19

## 2023-09-22 RX ORDER — ANASTROZOLE 1 MG/1
1 TABLET ORAL DAILY
Qty: 90 TABLET | Refills: 5 | Status: SHIPPED | OUTPATIENT
Start: 2023-09-22 | End: 2025-03-15

## 2023-09-22 RX ORDER — EZETIMIBE 10 MG/1
10 TABLET ORAL DAILY
COMMUNITY
Start: 2023-08-15

## 2023-09-22 RX ORDER — DOXYCYCLINE HYCLATE 100 MG
TABLET ORAL
COMMUNITY
Start: 2023-09-19

## 2023-09-22 ASSESSMENT — PATIENT HEALTH QUESTIONNAIRE - PHQ9
1. LITTLE INTEREST OR PLEASURE IN DOING THINGS: 0
SUM OF ALL RESPONSES TO PHQ QUESTIONS 1-9: 0
2. FEELING DOWN, DEPRESSED OR HOPELESS: 0
SUM OF ALL RESPONSES TO PHQ9 QUESTIONS 1 & 2: 0

## 2023-09-22 NOTE — PROGRESS NOTES
MA Rooming Questions  Patient: Thad Joseph  MRN: [id number]    Date: 9/22/2023        1. Do you have any new issues? Yes bronchitis. 2. Do you need any refills on medications?    no    3. Have you had any imaging done since your last visit? yes - Mammo, DEXA    4. Have you been hospitalized or seen in the emergency room since your last visit here?   no    5. Did the patient have a depression screening completed today?  Yes    No data recorded     PHQ-9 Given to (if applicable):               PHQ-9 Score (if applicable):                     [] Positive     []  Negative              Does question #9 need addressed (if applicable)                     [] Yes    []  No               Rodriguez Araiza CMA
assistance of a speech-recognition program. While intending to generate a document that accurately reflects the content of the visit, no guarantee can be provided that every mistake has been identified and corrected by editing. Portions of this note are copied forward from previous clinic note. Interval history, ROS, physical exam, assessment and plan has been reviewed and updated for accuracy by this provider.     Allyson

## 2024-02-02 PROBLEM — I83.893 VARICOSE VEINS OF BILATERAL LOWER EXTREMITIES WITH OTHER COMPLICATIONS: Status: ACTIVE | Noted: 2024-02-02

## 2024-02-15 PROBLEM — R22.43 LOCALIZED SWELLING OF BOTH LOWER LEGS: Status: ACTIVE | Noted: 2024-02-15

## 2024-02-15 PROBLEM — Q82.0 HEREDITARY LYMPHEDEMA: Status: ACTIVE | Noted: 2024-02-15

## 2024-04-23 ENCOUNTER — OFFICE VISIT (OUTPATIENT)
Dept: ONCOLOGY | Age: 79
End: 2024-04-23
Payer: MEDICARE

## 2024-04-23 ENCOUNTER — HOSPITAL ENCOUNTER (OUTPATIENT)
Dept: INFUSION THERAPY | Age: 79
Discharge: HOME | End: 2024-04-23
Payer: MEDICARE

## 2024-04-23 VITALS
SYSTOLIC BLOOD PRESSURE: 164 MMHG | BODY MASS INDEX: 35.12 KG/M2 | OXYGEN SATURATION: 98 % | RESPIRATION RATE: 18 BRPM | WEIGHT: 210.8 LBS | TEMPERATURE: 96.9 F | HEART RATE: 84 BPM | HEIGHT: 65 IN | DIASTOLIC BLOOD PRESSURE: 69 MMHG

## 2024-04-23 DIAGNOSIS — E05.00 GRAVES DISEASE: ICD-10-CM

## 2024-04-23 DIAGNOSIS — K14.8 TONGUE LESION: ICD-10-CM

## 2024-04-23 DIAGNOSIS — C79.89 CHEST WALL RECURRENCE OF BREAST CANCER, RIGHT (HCC): ICD-10-CM

## 2024-04-23 DIAGNOSIS — Z17.0 MALIGNANT NEOPLASM OF UPPER-OUTER QUADRANT OF RIGHT BREAST IN FEMALE, ESTROGEN RECEPTOR POSITIVE (HCC): Primary | ICD-10-CM

## 2024-04-23 DIAGNOSIS — C50.411 MALIGNANT NEOPLASM OF UPPER-OUTER QUADRANT OF RIGHT BREAST IN FEMALE, ESTROGEN RECEPTOR POSITIVE (HCC): ICD-10-CM

## 2024-04-23 DIAGNOSIS — C50.911 CHEST WALL RECURRENCE OF BREAST CANCER, RIGHT (HCC): ICD-10-CM

## 2024-04-23 DIAGNOSIS — R22.9 SKIN NODULE: ICD-10-CM

## 2024-04-23 DIAGNOSIS — C50.411 MALIGNANT NEOPLASM OF UPPER-OUTER QUADRANT OF RIGHT BREAST IN FEMALE, ESTROGEN RECEPTOR POSITIVE (HCC): Primary | ICD-10-CM

## 2024-04-23 DIAGNOSIS — Z17.0 MALIGNANT NEOPLASM OF UPPER-OUTER QUADRANT OF RIGHT BREAST IN FEMALE, ESTROGEN RECEPTOR POSITIVE (HCC): ICD-10-CM

## 2024-04-23 LAB
ALBUMIN SERPL-MCNC: 5 GM/DL (ref 3.4–5)
ALP BLD-CCNC: 77 IU/L (ref 40–128)
ALT SERPL-CCNC: 18 U/L (ref 10–40)
ANION GAP SERPL CALCULATED.3IONS-SCNC: 12 MMOL/L (ref 7–16)
AST SERPL-CCNC: 17 IU/L (ref 15–37)
BASOPHILS ABSOLUTE: 0 K/CU MM
BASOPHILS RELATIVE PERCENT: 0.5 % (ref 0–1)
BILIRUB SERPL-MCNC: 0.3 MG/DL (ref 0–1)
BUN SERPL-MCNC: 24 MG/DL (ref 6–23)
CALCIUM SERPL-MCNC: 10.7 MG/DL (ref 8.3–10.6)
CHLORIDE BLD-SCNC: 101 MMOL/L (ref 99–110)
CO2: 27 MMOL/L (ref 21–32)
CREAT SERPL-MCNC: 0.8 MG/DL (ref 0.6–1.1)
DIFFERENTIAL TYPE: ABNORMAL
EOSINOPHILS ABSOLUTE: 0.1 K/CU MM
EOSINOPHILS RELATIVE PERCENT: 1.1 % (ref 0–3)
GFR SERPL CREATININE-BSD FRML MDRD: 75 ML/MIN/1.73M2
GLUCOSE SERPL-MCNC: 108 MG/DL (ref 70–99)
HCT VFR BLD CALC: 43.4 % (ref 37–47)
HEMOGLOBIN: 15.3 GM/DL (ref 12.5–16)
LYMPHOCYTES ABSOLUTE: 0.9 K/CU MM
LYMPHOCYTES RELATIVE PERCENT: 14.8 % (ref 24–44)
MCH RBC QN AUTO: 35.4 PG (ref 27–31)
MCHC RBC AUTO-ENTMCNC: 35.3 % (ref 32–36)
MCV RBC AUTO: 100.5 FL (ref 78–100)
MONOCYTES ABSOLUTE: 0.5 K/CU MM
MONOCYTES RELATIVE PERCENT: 7.7 % (ref 0–4)
NEUTROPHILS RELATIVE PERCENT: 75.9 % (ref 36–66)
PDW BLD-RTO: 12.3 % (ref 11.7–14.9)
PLATELET # BLD: 228 K/CU MM (ref 140–440)
PMV BLD AUTO: 9.6 FL (ref 7.5–11.1)
POTASSIUM SERPL-SCNC: 3.8 MMOL/L (ref 3.5–5.1)
RBC # BLD: 4.32 M/CU MM (ref 4.2–5.4)
SEGMENTED NEUTROPHILS ABSOLUTE COUNT: 4.8 K/CU MM
SODIUM BLD-SCNC: 140 MMOL/L (ref 135–145)
TOTAL PROTEIN: 7.2 GM/DL (ref 6.4–8.2)
WBC # BLD: 6.4 K/CU MM (ref 4–10.5)

## 2024-04-23 PROCEDURE — 86300 IMMUNOASSAY TUMOR CA 15-3: CPT

## 2024-04-23 PROCEDURE — 1123F ACP DISCUSS/DSCN MKR DOCD: CPT | Performed by: INTERNAL MEDICINE

## 2024-04-23 PROCEDURE — 99214 OFFICE O/P EST MOD 30 MIN: CPT | Performed by: INTERNAL MEDICINE

## 2024-04-23 PROCEDURE — 36415 COLL VENOUS BLD VENIPUNCTURE: CPT

## 2024-04-23 PROCEDURE — 99211 OFF/OP EST MAY X REQ PHY/QHP: CPT

## 2024-04-23 PROCEDURE — 85025 COMPLETE CBC W/AUTO DIFF WBC: CPT

## 2024-04-23 PROCEDURE — 80053 COMPREHEN METABOLIC PANEL: CPT

## 2024-04-23 RX ORDER — ANASTROZOLE 1 MG/1
1 TABLET ORAL DAILY
Qty: 90 TABLET | Refills: 5 | Status: SHIPPED | OUTPATIENT
Start: 2024-04-23 | End: 2025-10-15

## 2024-04-23 NOTE — PROGRESS NOTES
MA Rooming Questions  Patient: Tasha Waggoner  MRN: [id number]    Date: 4/23/2024        1. Do you have any new issues?   yes - Lump on R side upper arm/back area    Legs lymphadenopathy     Dry eye disease         2. Do you need any refills on medications?    yes - arimidex    3. Have you had any imaging done since your last visit?   yes - SPINE X-RAY + CHEST X-RAY    4. Have you been hospitalized or seen in the emergency room since your last visit here?   no    5. Did the patient have a depression screening completed today? No    No data recorded     PHQ-9 Given to (if applicable):               PHQ-9 Score (if applicable):                     [] Positive     []  Negative              Does question #9 need addressed (if applicable)                     [] Yes    []  No               India Dougherty MA    
Sodium was 141, potassium 3.4, chloride 95, calcium 10.0. The rest of the chemistries are normal. LFTs normal, , uric acid 6.0. CEA 1.6, CA 27 14.     11-17 DEXA scan showed osteopenia, CT chest abdomen pelvis showed no evidence of metastatic disease, fatty liver, right renal nonobstructing parapelvic cyst, diverticulosis. Nuclear med bone scan showed no evidence to suggest osseous metastatic disease.  June 2018 zometa started but never got more than 1  July 2018 Left breast with no mammographic evidence of malignancy  july 2019 zometa restarted but got only one dose again  9-2019 stopped anastrazole for bone pain/arthritic pains, which got better, then placed on letrozole but made her feel bad so she stopped it.   10-19 restarted anastrazole   Feb 2020 stopped AI  Feb 2020 Mammogram normal BIRADS cat 2  June 2020 restarted taking anastrazole.    7/9/2020 limited right breast axillary ultrasound revealed highly suspicious right breast 9:00 mass correlating to the palpable complaint.    7/14/2020 right breast area mass biopsy revealed invasive ductal carcinoma grade 2. ER greater than 95% positive. SC negative. HER-2 by IHC negative HER-2 by FISH pending.     7/16/2026 PET scan revealed metabolically active nodule along the inferior lateral margin of the right mastectomy site concerning for recurrent disease. This has slowly enlarged since 11/21/2017. No other metabolically active disease.    8/7/20: underwent excision with skin flap. Tumor size was 3cm, Grade 2, DCIS/LCIS not present. ER positive >95%, SC negative and her 2 neg by IHC and FISH. Margins <1mm     No recs for adjuvant XRT per Dr Carrizales.     Resumed AI September 2020 7/22/21 Left Mammogram  1. No Mammographic evidence for malignancy. Staus post removal of left chest wall port.  2. Recommend annual screening mammography and clinical breast exam.  Bi-rads: 2    8/23/21 CA 27.29 14.3  CBC WBC 4.8, HGB 14.2, HCT 41.6, .2, Platelets 214,

## 2024-04-25 LAB — CANCER AG27-29 SERPL-ACNC: <9 U/ML

## 2024-05-09 PROBLEM — L72.3 SEBACEOUS CYST: Status: ACTIVE | Noted: 2024-05-09

## 2024-06-26 ENCOUNTER — CLINICAL DOCUMENTATION (OUTPATIENT)
Dept: ONCOLOGY | Age: 79
End: 2024-06-26

## 2024-06-26 NOTE — PROGRESS NOTES
Received VM from patient stating that she received \"disturbing\" news from Dr. Gutierrez and requesting to be seen in clinic sooner than 07/12/2024. Called patient with several appointment options. Patient added to schedule for OV on 06/27/2024 @ 1430 per patient preference. Denies further needs at this time.

## 2024-06-27 ENCOUNTER — OFFICE VISIT (OUTPATIENT)
Dept: ONCOLOGY | Age: 79
End: 2024-06-27
Payer: MEDICARE

## 2024-06-27 ENCOUNTER — HOSPITAL ENCOUNTER (OUTPATIENT)
Dept: INFUSION THERAPY | Age: 79
Discharge: HOME | End: 2024-06-27
Payer: MEDICARE

## 2024-06-27 VITALS
DIASTOLIC BLOOD PRESSURE: 70 MMHG | HEART RATE: 116 BPM | WEIGHT: 207 LBS | BODY MASS INDEX: 34.49 KG/M2 | HEIGHT: 65 IN | SYSTOLIC BLOOD PRESSURE: 153 MMHG | TEMPERATURE: 98.2 F | OXYGEN SATURATION: 98 %

## 2024-06-27 DIAGNOSIS — C50.411 MALIGNANT NEOPLASM OF UPPER-OUTER QUADRANT OF RIGHT BREAST IN FEMALE, ESTROGEN RECEPTOR POSITIVE (HCC): Primary | ICD-10-CM

## 2024-06-27 DIAGNOSIS — Z17.0 MALIGNANT NEOPLASM OF UPPER-OUTER QUADRANT OF RIGHT BREAST IN FEMALE, ESTROGEN RECEPTOR POSITIVE (HCC): Primary | ICD-10-CM

## 2024-06-27 DIAGNOSIS — C50.911 CHEST WALL RECURRENCE OF BREAST CANCER, RIGHT (HCC): ICD-10-CM

## 2024-06-27 DIAGNOSIS — C79.89 CHEST WALL RECURRENCE OF BREAST CANCER, RIGHT (HCC): ICD-10-CM

## 2024-06-27 PROCEDURE — 99211 OFF/OP EST MAY X REQ PHY/QHP: CPT

## 2024-06-27 PROCEDURE — 99214 OFFICE O/P EST MOD 30 MIN: CPT | Performed by: INTERNAL MEDICINE

## 2024-06-27 PROCEDURE — 1123F ACP DISCUSS/DSCN MKR DOCD: CPT | Performed by: INTERNAL MEDICINE

## 2024-06-27 RX ORDER — ANASTROZOLE 1 MG/1
1 TABLET ORAL DAILY
Qty: 90 TABLET | Refills: 5 | Status: SHIPPED | OUTPATIENT
Start: 2024-06-27 | End: 2025-12-19

## 2024-06-27 NOTE — PROGRESS NOTES
MA Rooming Questions  Patient: Tasha Waggoner  MRN: [id number]    Date: 6/27/2024        1. Do you have any new issues?   yes - Patient is concerned was told that her cancer is back.          2. Do you need any refills on medications?    yes - anastrozole    3. Have you had any imaging done since your last visit?   yes - mammo, dexa. Had sx 6/17 a lump removal with Dr. Gutierrez.     4. Have you been hospitalized or seen in the emergency room since your last visit here?   no    5. Did the patient have a depression screening completed today? No    No data recorded     PHQ-9 Given to (if applicable):               PHQ-9 Score (if applicable):                     [] Positive     []  Negative              Does question #9 need addressed (if applicable)                     [] Yes    []  No               Samanta Jeffries MA

## 2024-06-27 NOTE — PROGRESS NOTES
Nilson Jesus Name: Tasha Waggoner  Patient : 1945  Patient MRN: [id number]     Primary Oncologist: Lona Dunn MD  PCP:Dr HILLARY Spivey  Surgeon Dr Chopra     Date of Service: 2024    Chief Complaint:   Chief Complaint   Patient presents with    Follow-up        Encounter Diagnoses   Name Primary?    Malignant neoplasm of upper-outer quadrant of right breast in female, estrogen receptor positive (HCC) Yes    Chest wall recurrence of breast cancer, right (HCC)        HPI  Mrs. Waggoner ( 45) has a history of right breast cancer, for which she had modified radical mastectomy in . She had a couple of local recurrences, treated with chemotherapy and radiation therapy and excision of the chest wall recurrences.  In  she had a total abdominal hysterectomy/bilateral salpingo-oophorectomy, which was done as part of hormonal therapy and Tamoxifen for one year. After that she did not show any evidence of recurrence.  In  she presented with right axillary mass which was biopsied and confirmed to be an invasive carcinoma, morphologically consistent with origin from her breast primary. Her ER was positive 90 percent, IN positive. HER-2/naomi was negative by FISH.   All other w/u including CT of the chest, abdomen and pelvis showed no evidence of any intraabdominal mass or metastases. Bone scan was negative for any bony mets. Her MUGA scan showed 61 percent ejection fraction.  She was started on Taxol d1&8 [id number] for 6 courses starting in Dec of 2012 till 2013 and showed good partial response with mass size reduced to 2.4 cm in  from 3 cm in 2012 and completed 6 courses in 2013. Right Ax mass completely resolved over next couple years.  In May 2013 she was initiated on Hormonal therapy with Arimidex 1 mg daily for maintenance treatment.   In continued Clinical remission. 2017 Hemoglobin 14.3, hematocrit 42, white count 5,900, platelet 234,000, BUN 17, creatinine 0.7.

## 2024-07-10 PROBLEM — C79.2 BREAST CANCER METASTASIZED TO SKIN, RIGHT (HCC): Status: ACTIVE | Noted: 2024-07-10

## 2024-07-10 PROBLEM — C50.911 BREAST CANCER METASTASIZED TO SKIN, RIGHT (HCC): Status: ACTIVE | Noted: 2024-07-10

## 2024-07-12 ENCOUNTER — HOSPITAL ENCOUNTER (OUTPATIENT)
Dept: PET IMAGING | Age: 79
Discharge: HOME | End: 2024-07-12
Attending: INTERNAL MEDICINE
Payer: MEDICARE

## 2024-07-12 DIAGNOSIS — C50.911 CHEST WALL RECURRENCE OF BREAST CANCER, RIGHT (HCC): ICD-10-CM

## 2024-07-12 DIAGNOSIS — C79.89 CHEST WALL RECURRENCE OF BREAST CANCER, RIGHT (HCC): ICD-10-CM

## 2024-07-12 DIAGNOSIS — Z17.0 MALIGNANT NEOPLASM OF UPPER-OUTER QUADRANT OF RIGHT BREAST IN FEMALE, ESTROGEN RECEPTOR POSITIVE (HCC): ICD-10-CM

## 2024-07-12 DIAGNOSIS — C50.411 MALIGNANT NEOPLASM OF UPPER-OUTER QUADRANT OF RIGHT BREAST IN FEMALE, ESTROGEN RECEPTOR POSITIVE (HCC): ICD-10-CM

## 2024-07-12 PROCEDURE — 2580000003 HC RX 258: Performed by: INTERNAL MEDICINE

## 2024-07-12 PROCEDURE — A9609 HC RX DIAGNOSTIC RADIOPHARMACEUTICAL: HCPCS | Performed by: INTERNAL MEDICINE

## 2024-07-12 PROCEDURE — 78815 PET IMAGE W/CT SKULL-THIGH: CPT

## 2024-07-12 PROCEDURE — 3430000000 HC RX DIAGNOSTIC RADIOPHARMACEUTICAL: Performed by: INTERNAL MEDICINE

## 2024-07-12 RX ORDER — SODIUM CHLORIDE 0.9 % (FLUSH) 0.9 %
10 SYRINGE (ML) INJECTION PRN
Status: COMPLETED | OUTPATIENT
Start: 2024-07-12 | End: 2024-07-12

## 2024-07-12 RX ORDER — FLUDEOXYGLUCOSE F 18 200 MCI/ML
13.3 INJECTION, SOLUTION INTRAVENOUS
Status: COMPLETED | OUTPATIENT
Start: 2024-07-12 | End: 2024-07-12

## 2024-07-12 RX ADMIN — SODIUM CHLORIDE, PRESERVATIVE FREE 10 ML: 5 INJECTION INTRAVENOUS at 08:42

## 2024-07-12 RX ADMIN — FLUDEOXYGLUCOSE F 18 13.3 MILLICURIE: 200 INJECTION, SOLUTION INTRAVENOUS at 08:42

## 2024-07-17 ENCOUNTER — HOSPITAL ENCOUNTER (OUTPATIENT)
Dept: INFUSION THERAPY | Age: 79
Discharge: HOME | End: 2024-07-17
Payer: MEDICARE

## 2024-07-17 ENCOUNTER — OFFICE VISIT (OUTPATIENT)
Dept: ONCOLOGY | Age: 79
End: 2024-07-17
Payer: MEDICARE

## 2024-07-17 VITALS
HEART RATE: 89 BPM | DIASTOLIC BLOOD PRESSURE: 70 MMHG | HEIGHT: 65 IN | OXYGEN SATURATION: 96 % | SYSTOLIC BLOOD PRESSURE: 157 MMHG | BODY MASS INDEX: 34.82 KG/M2 | WEIGHT: 209 LBS | TEMPERATURE: 96.6 F

## 2024-07-17 DIAGNOSIS — Z17.0 MALIGNANT NEOPLASM OF UPPER-OUTER QUADRANT OF RIGHT BREAST IN FEMALE, ESTROGEN RECEPTOR POSITIVE (HCC): Primary | ICD-10-CM

## 2024-07-17 DIAGNOSIS — C50.411 MALIGNANT NEOPLASM OF UPPER-OUTER QUADRANT OF RIGHT BREAST IN FEMALE, ESTROGEN RECEPTOR POSITIVE (HCC): Primary | ICD-10-CM

## 2024-07-17 PROCEDURE — 99211 OFF/OP EST MAY X REQ PHY/QHP: CPT

## 2024-07-17 PROCEDURE — 1123F ACP DISCUSS/DSCN MKR DOCD: CPT | Performed by: INTERNAL MEDICINE

## 2024-07-17 PROCEDURE — 99214 OFFICE O/P EST MOD 30 MIN: CPT | Performed by: INTERNAL MEDICINE

## 2024-07-17 NOTE — PROGRESS NOTES
MA Rooming Questions  Patient: Tasha Waggoner  MRN: [id number]    Date: 7/17/2024        1. Do you have any new issues?   no         2. Do you need any refills on medications?    no    3. Have you had any imaging done since your last visit?   Yes - PET scan    4. Have you been hospitalized or seen in the emergency room since your last visit here?   no    5. Did the patient have a depression screening completed today? Yes    PHQ-9 Total Score: 0 (7/17/2024  2:08 PM)       PHQ-9 Given to (if applicable):               PHQ-9 Score (if applicable):                     [] Positive     [x]  Negative              Does question #9 need addressed (if applicable)                     [] Yes    []  No               Heaven Esparza CMA    
Margins are positive.  PET scan with no evidence of metastatic disease.  I recommend reexcision for negative margins.  Discussed with surgery.  Also will discuss with radiation oncology for possible radiation, not sure if can be given again as she received radiation in the past  Also discussed changing AI to Faslodex and discussed the the adverse effects.  Will continue to monitor clinically and he also CA 27-29.  Weekly.    SC nodules: met disease. Plan as above     LE edema/lymphedema:Compression devise.Is on diuretics,follows with Dr Sweet    Macrocytosis without anemia: Continue to monitor    Graves disease: is followed by endocrinology.     Hypertension: Monitor BP 2 times a day at home and log. Recommend low salt diet and exercise as tolerated. Discuss with PCP or Cardiologist if remains high.    Continue other medical care    Discussed the above findings and plan with her and she verbalized understanding    Discussed Lifestyle, healthy diet and regular exercise as tolerated. Also discussed importance of being up-to-date with age-appropriate screening tools, Left mammogram due again July 2022.Colonoscopy august 2021 with diverticulitis, hemorrhoids and one polyp.     Recommend follow-up with primary care physician and other specialists.    Please do not hesitate to contact us if you need ay further information.     Return to clinic August 14, 2024 or earlier if new symptoms.    This note is created with the assistance of a speech-recognition program. While intending to generate a document that accurately reflects the content of the visit, no guarantee can be provided that every mistake has been identified and corrected by editing.    Portions of this note are copied forward from previous clinic note.  Interval history, ROS, physical exam, assessment and plan has been reviewed and updated for accuracy by this provider.    Time Spent with patient for face to face, exam, education, discussing treatment options,

## 2024-07-24 ENCOUNTER — CLINICAL DOCUMENTATION (OUTPATIENT)
Dept: ONCOLOGY | Age: 79
End: 2024-07-24

## 2024-07-24 DIAGNOSIS — Z17.0 MALIGNANT NEOPLASM OF UPPER-OUTER QUADRANT OF RIGHT BREAST IN FEMALE, ESTROGEN RECEPTOR POSITIVE (HCC): Primary | ICD-10-CM

## 2024-07-24 DIAGNOSIS — C50.411 MALIGNANT NEOPLASM OF UPPER-OUTER QUADRANT OF RIGHT BREAST IN FEMALE, ESTROGEN RECEPTOR POSITIVE (HCC): Primary | ICD-10-CM

## 2024-07-24 DIAGNOSIS — C79.89 CHEST WALL RECURRENCE OF BREAST CANCER, RIGHT (HCC): ICD-10-CM

## 2024-07-24 DIAGNOSIS — C50.911 CHEST WALL RECURRENCE OF BREAST CANCER, RIGHT (HCC): ICD-10-CM

## 2024-07-24 NOTE — PROGRESS NOTES
Per Dr. Dunn - order placed for Yvette Graves on recent surgical path done at [facility] - accession #[id number] skin chest excision which revealed recurrence of patient's breast cancer, ER positive, MI negative, HER2 negative by FISH.  Per Dr. Dunn - patient to switch to Faslodex injections from oral Arimidex (patient to stop Arimidex once she starts the Faslodex).  Per Dr. Dunn - referral placed to Dr. Hart to determine if patient can receive more radiation to right axilla.

## 2024-07-25 DIAGNOSIS — Z17.0 MALIGNANT NEOPLASM OF UPPER-OUTER QUADRANT OF RIGHT BREAST IN FEMALE, ESTROGEN RECEPTOR POSITIVE (HCC): Primary | ICD-10-CM

## 2024-07-25 DIAGNOSIS — C50.411 MALIGNANT NEOPLASM OF UPPER-OUTER QUADRANT OF RIGHT BREAST IN FEMALE, ESTROGEN RECEPTOR POSITIVE (HCC): Primary | ICD-10-CM

## 2024-07-25 LAB — TEMPUS PORTAL: NORMAL

## 2024-07-26 DIAGNOSIS — C79.89 CHEST WALL RECURRENCE OF BREAST CANCER, RIGHT (HCC): ICD-10-CM

## 2024-07-26 DIAGNOSIS — Z17.0 MALIGNANT NEOPLASM OF UPPER-OUTER QUADRANT OF RIGHT BREAST IN FEMALE, ESTROGEN RECEPTOR POSITIVE (HCC): Primary | ICD-10-CM

## 2024-07-26 DIAGNOSIS — C50.411 MALIGNANT NEOPLASM OF UPPER-OUTER QUADRANT OF RIGHT BREAST IN FEMALE, ESTROGEN RECEPTOR POSITIVE (HCC): Primary | ICD-10-CM

## 2024-07-26 DIAGNOSIS — C50.911 CHEST WALL RECURRENCE OF BREAST CANCER, RIGHT (HCC): ICD-10-CM

## 2024-07-27 LAB — TEMPUS PORTAL: NORMAL

## 2024-08-01 ENCOUNTER — HOSPITAL ENCOUNTER (OUTPATIENT)
Dept: RADIATION ONCOLOGY | Age: 79
Discharge: HOME | End: 2024-08-01

## 2024-08-01 VITALS
TEMPERATURE: 98.1 F | DIASTOLIC BLOOD PRESSURE: 61 MMHG | HEIGHT: 65 IN | BODY MASS INDEX: 26.82 KG/M2 | SYSTOLIC BLOOD PRESSURE: 134 MMHG | HEART RATE: 81 BPM | WEIGHT: 161 LBS | RESPIRATION RATE: 16 BRPM | OXYGEN SATURATION: 97 %

## 2024-08-01 DIAGNOSIS — C50.911 BREAST CANCER METASTASIZED TO SKIN, RIGHT (HCC): Primary | ICD-10-CM

## 2024-08-01 DIAGNOSIS — C79.2 BREAST CANCER METASTASIZED TO SKIN, RIGHT (HCC): Primary | ICD-10-CM

## 2024-08-01 NOTE — CONSULTS
Radiation Oncology Consultation  Encounter Date: 2024 9:48 AM    Ms. Tasha Waggoner is a 79 y.o. female  : 1945  MRN: [id number]  Acct Number: [id number]  Requesting Provider: No att. providers found        CONSULTANT: Carter Villanueva MD    PHYSICIANS:   Primary Care: Khanh Spivey MD   Med onc: Dr. Shaun Gutierrez    DIAGNOSIS: history of R breast cancer sp mastectomy 1972 status post chemotherapy and radiation therapy followed by right axilla mass recurrence 2012 sp excision with R CW mass recurrence 2020 sp excision now with another R CW/axillary tail recurrence IDC ER+ [place]- her2- with positive margin    TREATMENT COURSE:    R Breast IDC sp mastectomy   Chest wall recurrence, excision and chemotherapy  Multiple recurrences, had R CW radiation 46Gy/23fx cobalt   R axillary mass biopsy proven 2012 sp excision and chemotherapy  R CW recurrence 2020 excised with skin flap and endocrine therapy. Adjuvant radiation was not recommended by Dr. Carrizales at that time  Most recent R CW recurrence again IDC atleast 1.5 cm ER+ [place]- her2-    HPI:     Ms. Waggoner is a 79 year old female who presents for consultation of radiotherapy treatment options the above diagnosis.  She has a complicated long history of right breast cancer with surgeries and recurrences as noted above.    Most recently she had a mass in the right chest wall that was excised.  It showed carcinoma consistent with invasive carcinoma of the breast origin, tumor was present at peripheral/side margins.  It was at least 1.5 cm in size.  Grade 1 and LVSI was not present.  It was triple negative.    PET/CT done 2024 showed no foci of abnormal FDG avidity to suggest neoplastic disease.    Denies any new lumps/bumps, chest pain, cough, hemoptysis, or involuntary weight loss. She tolerated radiation therapy well in the 80s. No pace maker or cardiac implanted device.     She is seeing Dr. Gutierrez tomorrow. She has seen Dr. Dunn and is

## 2024-08-02 RX ORDER — SODIUM CHLORIDE 9 MG/ML
INJECTION, SOLUTION INTRAVENOUS CONTINUOUS
OUTPATIENT
Start: 2024-08-19

## 2024-08-02 RX ORDER — EPINEPHRINE 1 MG/ML
0.3 INJECTION, SOLUTION, CONCENTRATE INTRAVENOUS PRN
OUTPATIENT
Start: 2024-08-19

## 2024-08-02 RX ORDER — ACETAMINOPHEN 325 MG/1
650 TABLET ORAL
OUTPATIENT
Start: 2024-08-05

## 2024-08-02 RX ORDER — ACETAMINOPHEN 325 MG/1
650 TABLET ORAL
OUTPATIENT
Start: 2024-08-19

## 2024-08-02 RX ORDER — ALBUTEROL SULFATE 90 UG/1
4 AEROSOL, METERED RESPIRATORY (INHALATION) PRN
OUTPATIENT
Start: 2024-08-05

## 2024-08-02 RX ORDER — DIPHENHYDRAMINE HYDROCHLORIDE 50 MG/ML
50 INJECTION INTRAMUSCULAR; INTRAVENOUS
OUTPATIENT
Start: 2024-08-19

## 2024-08-02 RX ORDER — ONDANSETRON 2 MG/ML
8 INJECTION INTRAMUSCULAR; INTRAVENOUS
OUTPATIENT
Start: 2024-08-19

## 2024-08-02 RX ORDER — DIPHENHYDRAMINE HYDROCHLORIDE 50 MG/ML
50 INJECTION INTRAMUSCULAR; INTRAVENOUS
OUTPATIENT
Start: 2024-08-05

## 2024-08-02 RX ORDER — FAMOTIDINE 10 MG/ML
20 INJECTION, SOLUTION INTRAVENOUS
OUTPATIENT
Start: 2024-08-19

## 2024-08-02 RX ORDER — EPINEPHRINE 1 MG/ML
0.3 INJECTION, SOLUTION, CONCENTRATE INTRAVENOUS PRN
OUTPATIENT
Start: 2024-08-05

## 2024-08-02 RX ORDER — FAMOTIDINE 10 MG/ML
20 INJECTION, SOLUTION INTRAVENOUS
OUTPATIENT
Start: 2024-08-05

## 2024-08-02 RX ORDER — ONDANSETRON 2 MG/ML
8 INJECTION INTRAMUSCULAR; INTRAVENOUS
OUTPATIENT
Start: 2024-08-05

## 2024-08-02 RX ORDER — SODIUM CHLORIDE 9 MG/ML
INJECTION, SOLUTION INTRAVENOUS CONTINUOUS
OUTPATIENT
Start: 2024-08-05

## 2024-08-02 RX ORDER — ALBUTEROL SULFATE 90 UG/1
4 AEROSOL, METERED RESPIRATORY (INHALATION) PRN
OUTPATIENT
Start: 2024-08-19

## 2024-08-02 RX ORDER — LAMOTRIGINE 25 MG/1
500 TABLET ORAL ONCE
OUTPATIENT
Start: 2024-08-19 | End: 2024-08-19

## 2024-08-02 RX ORDER — MEPERIDINE HYDROCHLORIDE 25 MG/ML
12.5 INJECTION INTRAMUSCULAR; INTRAVENOUS; SUBCUTANEOUS PRN
OUTPATIENT
Start: 2024-08-19

## 2024-08-02 RX ORDER — MEPERIDINE HYDROCHLORIDE 25 MG/ML
12.5 INJECTION INTRAMUSCULAR; INTRAVENOUS; SUBCUTANEOUS PRN
OUTPATIENT
Start: 2024-08-05

## 2024-08-03 PROBLEM — C50.911 INVASIVE DUCTAL CARCINOMA OF RIGHT BREAST IN FEMALE (HCC): Status: ACTIVE | Noted: 2024-08-03

## 2024-08-05 ENCOUNTER — HOSPITAL ENCOUNTER (OUTPATIENT)
Dept: INFUSION THERAPY | Age: 79
Discharge: HOME | End: 2024-08-05
Payer: MEDICARE

## 2024-08-05 DIAGNOSIS — C79.2 BREAST CANCER METASTASIZED TO SKIN, RIGHT (HCC): Primary | ICD-10-CM

## 2024-08-05 DIAGNOSIS — C50.911 BREAST CANCER METASTASIZED TO SKIN, RIGHT (HCC): Primary | ICD-10-CM

## 2024-08-05 LAB
ALBUMIN SERPL-MCNC: 4.7 GM/DL (ref 3.4–5)
ALP BLD-CCNC: 69 IU/L (ref 40–129)
ALT SERPL-CCNC: 23 U/L (ref 10–40)
AST SERPL-CCNC: 21 IU/L (ref 15–37)
BILIRUB SERPL-MCNC: 0.5 MG/DL (ref 0–1)
BILIRUBIN DIRECT: 0.2 MG/DL (ref 0–0.3)
BILIRUBIN, INDIRECT: 0.3 MG/DL (ref 0–0.7)
TOTAL PROTEIN: 7.9 GM/DL (ref 6.4–8.2)

## 2024-08-05 PROCEDURE — 80076 HEPATIC FUNCTION PANEL: CPT

## 2024-08-05 PROCEDURE — 6360000002 HC RX W HCPCS: Performed by: INTERNAL MEDICINE

## 2024-08-05 PROCEDURE — 36415 COLL VENOUS BLD VENIPUNCTURE: CPT

## 2024-08-05 PROCEDURE — 96402 CHEMO HORMON ANTINEOPL SQ/IM: CPT

## 2024-08-05 RX ORDER — LAMOTRIGINE 25 MG/1
500 TABLET ORAL ONCE
Status: COMPLETED | OUTPATIENT
Start: 2024-08-05 | End: 2024-08-05

## 2024-08-05 RX ADMIN — FULVESTRANT 500 MG: 50 INJECTION, SOLUTION INTRAMUSCULAR at 12:05

## 2024-08-05 NOTE — PROGRESS NOTES
Patient arrived to treatment suite for new Faslodex injection.  Education on injection provided.  Treatment approved and given IM in right and left dorsogluteal areas, band-aids applied.  Cold spray utilized.  Patient tolerated well.  Left treatment suite ambulatory.  Discharge instructions provided.

## 2024-08-13 LAB
DNA RANGE(S) EXAMINED NAR: NORMAL
GENE DIS ANL INTERP-IMP: POSITIVE
GENE DIS ASSESSED: NORMAL
GENE MUT TESTED BLD/T: 2.6 M/MB
MSI CA SPEC-IMP: NORMAL
REASON FOR STUDY: NORMAL
TEMPUS GERMLINE NOTE: NORMAL
TEMPUS LCA: NORMAL
TEMPUS PERTINENTNEGATIVES: NORMAL
TEMPUS PORTAL: NORMAL
TEMPUS THERAPY1: NORMAL
TEMPUS THERAPY2: NORMAL
TEMPUS THERAPY3: NORMAL
TEMPUS THERAPYCOUNT: 3
TEMPUS TRIALCOUNT: 3
TEMPUS TRIALMATCHES1: NORMAL
TEMPUS TRIALMATCHES2: NORMAL
TEMPUS TRIALMATCHES3: NORMAL
TEMPUS XR RESULT 1: NORMAL

## 2024-08-14 DIAGNOSIS — G89.18 ACUTE POSTOPERATIVE PAIN: Primary | ICD-10-CM

## 2024-08-14 RX ORDER — HYDROCODONE BITARTRATE AND ACETAMINOPHEN 5; 325 MG/1; MG/1
1 TABLET ORAL EVERY 6 HOURS PRN
Qty: 15 TABLET | Refills: 0 | Status: SHIPPED | OUTPATIENT
Start: 2024-08-14 | End: 2024-08-21

## 2024-08-19 ENCOUNTER — CLINICAL DOCUMENTATION (OUTPATIENT)
Dept: ONCOLOGY | Age: 79
End: 2024-08-19

## 2024-08-19 ENCOUNTER — OFFICE VISIT (OUTPATIENT)
Dept: ONCOLOGY | Age: 79
End: 2024-08-19
Payer: MEDICARE

## 2024-08-19 ENCOUNTER — HOSPITAL ENCOUNTER (OUTPATIENT)
Dept: INFUSION THERAPY | Age: 79
Discharge: HOME | End: 2024-08-19
Payer: MEDICARE

## 2024-08-19 VITALS
BODY MASS INDEX: 28.32 KG/M2 | SYSTOLIC BLOOD PRESSURE: 138 MMHG | HEIGHT: 65 IN | DIASTOLIC BLOOD PRESSURE: 63 MMHG | TEMPERATURE: 97.6 F | HEART RATE: 79 BPM | OXYGEN SATURATION: 99 % | WEIGHT: 170 LBS

## 2024-08-19 DIAGNOSIS — C50.411 MALIGNANT NEOPLASM OF UPPER-OUTER QUADRANT OF RIGHT BREAST IN FEMALE, ESTROGEN RECEPTOR POSITIVE (HCC): Primary | ICD-10-CM

## 2024-08-19 DIAGNOSIS — C50.911 CHEST WALL RECURRENCE OF BREAST CANCER, RIGHT (HCC): ICD-10-CM

## 2024-08-19 DIAGNOSIS — Z17.0 MALIGNANT NEOPLASM OF UPPER-OUTER QUADRANT OF RIGHT BREAST IN FEMALE, ESTROGEN RECEPTOR POSITIVE (HCC): Primary | ICD-10-CM

## 2024-08-19 DIAGNOSIS — C79.89 CHEST WALL RECURRENCE OF BREAST CANCER, RIGHT (HCC): ICD-10-CM

## 2024-08-19 DIAGNOSIS — C79.2 BREAST CANCER METASTASIZED TO SKIN, RIGHT (HCC): Primary | ICD-10-CM

## 2024-08-19 DIAGNOSIS — C50.911 BREAST CANCER METASTASIZED TO SKIN, RIGHT (HCC): Primary | ICD-10-CM

## 2024-08-19 PROCEDURE — 1124F ACP DISCUSS-NO DSCNMKR DOCD: CPT | Performed by: INTERNAL MEDICINE

## 2024-08-19 PROCEDURE — 96402 CHEMO HORMON ANTINEOPL SQ/IM: CPT

## 2024-08-19 PROCEDURE — 99214 OFFICE O/P EST MOD 30 MIN: CPT | Performed by: INTERNAL MEDICINE

## 2024-08-19 PROCEDURE — 6360000002 HC RX W HCPCS: Performed by: INTERNAL MEDICINE

## 2024-08-19 RX ORDER — LAMOTRIGINE 25 MG/1
500 TABLET ORAL ONCE
Status: COMPLETED | OUTPATIENT
Start: 2024-08-19 | End: 2024-08-19

## 2024-08-19 RX ADMIN — FULVESTRANT 500 MG: 50 INJECTION, SOLUTION INTRAMUSCULAR at 11:43

## 2024-08-19 NOTE — PROGRESS NOTES
Status appropriately assessed and documented. All required labs and results reviewed. Treatment approved by provider. Treatment orders and medications verified by 2 Registered Nurses where applicable. Treatment plan was confirmed with patient prior to administration, and educated the need to report any treatment-related symptoms      Patient was in an OV room, here today for a Fasoldex injection. Had an OV with Dr. Dunn, No concerns at this time. Treatment approved and given in bilateral dorsal gluteal . Patient tolerated well. Patient had an OV with Dr. Dunn prior to injection. Will get AVS at check out.

## 2024-08-19 NOTE — PROGRESS NOTES
MA Rooming Questions  Patient: Tasha Waggoner  MRN: [id number]    Date: 8/19/2024        1. Do you have any new issues?   yes - Patient states had a lymph node removed last Tuesday on  8/17; patient states she told the sx team she was allergic to adhesive tape. Patient states they still used the adhesive tape. Patient also states she is dizzy and feels very tired. Patient also states is unsure if she wants to continue with the injections.         2. Do you need any refills on medications?    no    3. Have you had any imaging done since your last visit?   no    4. Have you been hospitalized or seen in the emergency room since your last visit here?   yes - 8/17- with Dr. Gutierrez. Lymph node removal on right side.     5. Did the patient have a depression screening completed today? No    No data recorded     PHQ-9 Given to (if applicable):               PHQ-9 Score (if applicable):                     [] Positive     []  Negative              Does question #9 need addressed (if applicable)                     [] Yes    []  No               Samanta Jeffries MA      
138/63   Pulse: 79   Temp: 97.6 °F (36.4 °C)   SpO2: 99%        Physical Exam:    CONSTITUTIONAL: awake, alert, obese, ambulates with a walker   EYES: mahsa, no palor or any icetrus   ENT: ATNC   NECK: no JVD   HEMATOLOGIC/LYMPHATIC: no cervical, supraclavicular or axillary lymphadenopathy   LUNGS: ctab  Breast; Rt mastectomy.  Right axillary scar with expected erythema, no discharge.  CARDIOVASCULAR: s1s2 rrr no murmurs   ABDOMEN: soft ntnd bs pos  NEUROLOGIC:GI   SKIN: No rash   EXTREMITIES:Bilateral LE edema 2-3+  Labs:    Hematology:  Lab Results   Component Value Date    WBC 6.4 04/23/2024    RBC 4.32 04/23/2024    HGB 15.3 04/23/2024    HCT 43.4 04/23/2024    .5 (H) 04/23/2024    MCH 35.4 (H) 04/23/2024    MCHC 35.3 04/23/2024    RDW 12.3 04/23/2024     04/23/2024    MPV 9.6 04/23/2024    BANDSPCT 2 (L) 11/21/2017    BASOPCT 0.5 04/23/2024    LYMPHOPCT 14.8 (L) 04/23/2024    MONOPCT 7.7 (H) 04/23/2024    BANDABS 0.10 11/21/2017    EOSABS 0.1 04/23/2024    BASOSABS 0.0 04/23/2024    LYMPHSABS 0.9 04/23/2024    MONOSABS 0.5 04/23/2024    DIFFTYPE AUTOMATED DIFFERENTIAL 04/23/2024    POLYCHROM 1+ 12/12/2016    PLTM FEW 12/12/2016     No results found for: \"ESR\"      Chemistry:  Lab Results   Component Value Date     04/23/2024    K 3.8 04/23/2024     04/23/2024    CO2 27 04/23/2024    BUN 24 (H) 04/23/2024    CREATININE 0.8 04/23/2024    GLUCOSE 108 (H) 04/23/2024    CALCIUM 10.7 (H) 04/23/2024    BILITOT 0.5 08/05/2024    ALKPHOS 69 08/05/2024    AST 21 08/05/2024    ALT 23 08/05/2024    LABGLOM 75 04/23/2024    GFRAA >60 07/20/2022    GLOB 1.8 (L) 01/18/2023    PHOS 3.0 01/18/2023    MG 2.1 01/18/2023    POCGLU 90 12/14/2020     Lab Results   Component Value Date     10/17/2017     No results found for: \"LD\"  Lab Results   Component Value Date    TSHHS 2.780 12/15/2020    T4FREE 1.32 12/15/2020    FT3 3.2 04/27/2017       Immunology:  No results found for: \"SPEP\", \"ALBUMINELP\",

## 2024-08-19 NOTE — PROGRESS NOTES
Per Dr. Dunn - oral Truqap (capivasertib) 200 mg tabs - to take 200 mg bid on days 1-4 and off on days 5-7 #32 tabs with 11 refills e-scripted to [facility] to begin the prior auth process.  Nurse navigator referral placed.

## 2024-08-20 ENCOUNTER — CLINICAL DOCUMENTATION (OUTPATIENT)
Dept: RADIATION ONCOLOGY | Age: 79
End: 2024-08-20

## 2024-08-20 ENCOUNTER — TELEPHONE (OUTPATIENT)
Dept: ONCOLOGY | Age: 79
End: 2024-08-20

## 2024-08-20 NOTE — PROGRESS NOTES
Pt called to report that re-excision was completed 8/13/2024.  As instructed by Dr. Villanueva, pt scheduled for follow up 8/28/2024 at 10:45 AM with CT/SIM for radiation planning to follow at 11:00 AM. No prep needed. This RN's direct contact info given, advised to call with further questions or concerns.

## 2024-08-20 NOTE — TELEPHONE ENCOUNTER
Pt would like to know more about any possible side effects of the Truqap before she begins taking it. Pt states that we may lvm if she doesn't answer.

## 2024-08-22 ENCOUNTER — CLINICAL DOCUMENTATION (OUTPATIENT)
Dept: ONCOLOGY | Age: 79
End: 2024-08-22

## 2024-08-22 NOTE — TELEPHONE ENCOUNTER
This RN called patient to discuss the new medication, Truqap, and to review the main possible side effects.  Patient voiced understanding and will be scheduled for education next Wednesday, 8/28/24 after her RT simulation.

## 2024-08-22 NOTE — PROGRESS NOTES
DISCHARGE                         2/11/2020  5:35 PM  ----------------------------------------------------------------------------  Discharged to: Home  Via: Automobile  Accompanied by: Family  Discharge Instructions: diet, activity, medications, follow up appointments, when to call the MD, aftercare instructions, and what to watchout for (i.e. s/s of infection, increasing SOB, palpitations, chest pain,)  Prescriptions: To be filled by hospital pharmacy per pt's request; medication list reviewed & sent with pt  Follow Up Appointments: arranged; information given  Belongings: All sent with pt  IV: out  Telemetry: off  Pt exhibits understanding of above discharge instructions; all questions answered.    Discharge Paperwork: Signed, copied, and sent home with patient.    Patient Assistance    Spoke with Tasha about applying for free drug for Truqap.  Tasha's copay is 725.00 per month and that is not affordable. She was denied free drug due to income.  We called rep and they are going to supply one month free drug.  I also called [employer] and they are going to try to help patient with copay until a thomas opens up and we can get her more assistance. This could be until 2025.      Anna Martines  Financial assistance     Navigator Type: Oral  Documentation Type: New Patient  Contact Type: Telephone  Status of Patient Insurance Coverage: Patient has active coverage       Drug Name: OTHER  Other Drug Name: Truqap  Form of PAP Assistance: Free Drug (Pending)

## 2024-08-28 ENCOUNTER — HOSPITAL ENCOUNTER (OUTPATIENT)
Dept: RADIATION ONCOLOGY | Age: 79
End: 2024-08-28
Payer: MEDICARE

## 2024-08-28 ENCOUNTER — HOSPITAL ENCOUNTER (OUTPATIENT)
Dept: RADIATION ONCOLOGY | Age: 79
Discharge: HOME | End: 2024-08-28
Payer: MEDICARE

## 2024-08-28 PROCEDURE — 99212 OFFICE O/P EST SF 10 MIN: CPT | Performed by: RADIOLOGY

## 2024-08-28 PROCEDURE — 99214 OFFICE O/P EST MOD 30 MIN: CPT | Performed by: RADIOLOGY

## 2024-08-28 NOTE — PROGRESS NOTES
[facility]   Radiation Oncology Center  [address] [place] [address]  Phone: [phone]  Fax: [phone]    RADIATION ONCOLOGY FOLLOW UP REPORT    PATIENT NAME:  Tasha Waggoner              : 1945  MEDICAL RECORD NO: [id number]    [facility] NO: [id number]        PROVIDER: Carter Villanueva MD      DATE OF SERVICE: 2024     FOLLOW UP PHYSICIANS:  Med onc: Dr. 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 Adjuvant radiation was not recommended by Dr. Carrizales at that time  Most recent R CW recurrence again IDC atleast 1.5 cm ER+ [place]- her2-    HPI:   Tasha Waggoner is a 79 y.o. female who has a history as above, who returns today for a routine follow-up visit.  She was last seen in clinic 24.     She underwent excision of a nodule in the right lateral chest on 2024.  Pathology revealed residual invasive carcinoma, 2 foci.  The areas were 1.5 cm and a smaller focus 4 mm.  There were both invasive ductal carcinoma, grade 1, Ki-67 5 to 10%.  The margins were involved.    It appears Dr. Gutierrez is planning to reexcised the margin in the office.  She was here today for CT simulation.  No new issues or problems.  Healing well. No new lumps/bumps, chest pain, cough, hemoptysis, new bony pain, or involuntary weight loss. Fatigue since on Faslodex.     RADIOLOGIC STUDIES:    LABORATORY STUDIES:   CBC:   Lab  Results   Component Value Date/Time    WBC 6.4 04/23/2024 01:54 PM    RBC 4.32 04/23/2024 01:54 PM    HGB 15.3 04/23/2024 01:54 PM    HCT 43.4 04/23/2024 01:54 PM    .5 04/23/2024 01:54 PM    MCH 35.4 04/23/2024 01:54 PM    MCHC 35.3 04/23/2024 01:54 PM    RDW 12.3 04/23/2024 01:54 PM     04/23/2024 01:54 PM    MPV 9.6 04/23/2024 01:54 PM     CMP:  Lab Results   Component Value Date/Time     04/23/2024 01:54 PM    K 3.8 04/23/2024 01:54 PM     04/23/2024 01:54 PM    CO2 27 04/23/2024 01:54 PM    BUN 24 04/23/2024 01:54 PM    CREATININE 0.8 04/23/2024 01:54 PM    GFRAA >60 07/20/2022 01:08 PM    LABGLOM 75 04/23/2024 01:54 PM    LABGLOM 89 01/18/2023 10:28 AM    GLUCOSE 108 04/23/2024 01:54 PM    CALCIUM 10.7 04/23/2024 01:54 PM    BILITOT 0.5 08/05/2024 11:09 AM    ALKPHOS 69 08/05/2024 11:09 AM    AST 21 08/05/2024 11:09 AM    ALT 23 08/05/2024 11:09 AM     Onc labs:   Lab Results   Component Value Date/Time    CEA 2.1 01/22/2019 02:53 PM     10/17/2017 10:15 AM       MEDICATIONS:   Current Outpatient Medications   Medication Sig Dispense Refill    Capivasertib 200 MG TABS Take 200 mg by mouth in the morning and at bedtime on days 1-4 (nothing on days 5-7). 32 tablet 11    spironolactone (ALDACTONE) 100 MG tablet Take 1 tablet by mouth daily 90 tablet 3    polyethyl glycol-propyl glycol 0.4-0.3 % (SYSTANE) 0.4-0.3 % ophthalmic solution Place 1 drop into both eyes in the morning and at bedtime      busPIRone (BUSPAR) 10 MG tablet Take 1 tablet by mouth 2 times daily      hydroCHLOROthiazide (HYDRODIURIL) 25 MG tablet Take 1 tablet by mouth daily 30 tablet 4    Chlorpheniramine Maleate (ALLERGY RELIEF PO) Take 1 tablet by mouth daily      Cholecalciferol (VITAMIN D3 PO) Take 1 tablet by mouth daily      VITAMIN A PO Take by mouth      Probiotic Product (PROBIOTIC-10 PO) Take by mouth      Ascorbic Acid (VITAMIN C) 500 MG CAPS Take 1 capsule by mouth daily      NONFORMULARY Relief

## 2024-08-30 RX ORDER — DIPHENHYDRAMINE HYDROCHLORIDE 50 MG/ML
50 INJECTION INTRAMUSCULAR; INTRAVENOUS
Status: CANCELLED | OUTPATIENT
Start: 2024-09-03

## 2024-08-30 RX ORDER — ACETAMINOPHEN 325 MG/1
650 TABLET ORAL
Status: CANCELLED | OUTPATIENT
Start: 2024-09-03

## 2024-08-30 RX ORDER — FAMOTIDINE 10 MG/ML
20 INJECTION, SOLUTION INTRAVENOUS
Status: CANCELLED | OUTPATIENT
Start: 2024-09-03

## 2024-08-30 RX ORDER — SODIUM CHLORIDE 9 MG/ML
INJECTION, SOLUTION INTRAVENOUS CONTINUOUS
Status: CANCELLED | OUTPATIENT
Start: 2024-09-03

## 2024-08-30 RX ORDER — ONDANSETRON 2 MG/ML
8 INJECTION INTRAMUSCULAR; INTRAVENOUS
Status: CANCELLED | OUTPATIENT
Start: 2024-09-03

## 2024-08-30 RX ORDER — ALBUTEROL SULFATE 90 UG/1
4 AEROSOL, METERED RESPIRATORY (INHALATION) PRN
Status: CANCELLED | OUTPATIENT
Start: 2024-09-03

## 2024-08-30 RX ORDER — MEPERIDINE HYDROCHLORIDE 25 MG/ML
12.5 INJECTION INTRAMUSCULAR; INTRAVENOUS; SUBCUTANEOUS PRN
Status: CANCELLED | OUTPATIENT
Start: 2024-09-03

## 2024-08-30 RX ORDER — EPINEPHRINE 1 MG/ML
0.3 INJECTION, SOLUTION, CONCENTRATE INTRAVENOUS PRN
Status: CANCELLED | OUTPATIENT
Start: 2024-09-03

## 2024-09-03 ENCOUNTER — HOSPITAL ENCOUNTER (OUTPATIENT)
Dept: INFUSION THERAPY | Age: 79
Discharge: HOME | End: 2024-09-03
Payer: MEDICARE

## 2024-09-03 DIAGNOSIS — C50.911 BREAST CANCER METASTASIZED TO SKIN, RIGHT (HCC): Primary | ICD-10-CM

## 2024-09-03 DIAGNOSIS — C79.2 BREAST CANCER METASTASIZED TO SKIN, RIGHT (HCC): Primary | ICD-10-CM

## 2024-09-03 PROCEDURE — 6360000002 HC RX W HCPCS: Performed by: INTERNAL MEDICINE

## 2024-09-03 PROCEDURE — 96402 CHEMO HORMON ANTINEOPL SQ/IM: CPT

## 2024-09-03 RX ORDER — LAMOTRIGINE 25 MG/1
500 TABLET ORAL ONCE
Status: COMPLETED | OUTPATIENT
Start: 2024-09-03 | End: 2024-09-03

## 2024-09-03 RX ADMIN — FULVESTRANT 500 MG: 50 INJECTION, SOLUTION INTRAMUSCULAR at 11:19

## 2024-09-03 NOTE — PROGRESS NOTES
Patient ambulated to infusion area, here today for a Faslodex injection. Patient reports feeling dizzy, weak, shaky, tired and ear pain x 7-10 days as well as cold chills. Per patient, has history of ear problems including tubes \"19 times\". Patient denies fevers, is eating and drinking ok, but admits that she may not be drinking enough. Discussed with GALINA Arango who feels that this is more ear related vs injection related and patient to f/u with PCP and/or ENT. Discussed with patient and reports she does have an ENT, who she will follow up with. Treatment approved and given in left and right dorsogluteal regions.. Patient tolerated well. Patient provided discharge instructions. RTC 09/30 for next injection and OV with Dr. Dunn.    Status appropriately assessed and documented. All required labs and results reviewed. Treatment approved by provider. Treatment orders and medications verified by 2 Registered Nurses where applicable. Treatment plan was confirmed with patient prior to administration, and educated the need to report any treatment-related symptoms

## 2024-09-19 PROBLEM — Z09 S/P EXCISION OF SKIN LESION, FOLLOW-UP EXAM: Status: ACTIVE | Noted: 2024-09-19

## 2024-09-27 RX ORDER — ALBUTEROL SULFATE 90 UG/1
4 INHALANT RESPIRATORY (INHALATION) PRN
Status: CANCELLED | OUTPATIENT
Start: 2024-10-02

## 2024-09-27 RX ORDER — ACETAMINOPHEN 325 MG/1
650 TABLET ORAL
Status: CANCELLED | OUTPATIENT
Start: 2024-10-02

## 2024-09-27 RX ORDER — EPINEPHRINE 1 MG/ML
0.3 INJECTION, SOLUTION, CONCENTRATE INTRAVENOUS PRN
Status: CANCELLED | OUTPATIENT
Start: 2024-10-02

## 2024-09-27 RX ORDER — DIPHENHYDRAMINE HYDROCHLORIDE 50 MG/ML
50 INJECTION INTRAMUSCULAR; INTRAVENOUS
Status: CANCELLED | OUTPATIENT
Start: 2024-10-02

## 2024-09-27 RX ORDER — MEPERIDINE HYDROCHLORIDE 25 MG/ML
12.5 INJECTION INTRAMUSCULAR; INTRAVENOUS; SUBCUTANEOUS PRN
Status: CANCELLED | OUTPATIENT
Start: 2024-10-02

## 2024-09-27 RX ORDER — ONDANSETRON 2 MG/ML
8 INJECTION INTRAMUSCULAR; INTRAVENOUS
Status: CANCELLED | OUTPATIENT
Start: 2024-10-02

## 2024-09-27 RX ORDER — SODIUM CHLORIDE 9 MG/ML
INJECTION, SOLUTION INTRAVENOUS CONTINUOUS
Status: CANCELLED | OUTPATIENT
Start: 2024-10-02

## 2024-09-27 RX ORDER — LAMOTRIGINE 25 MG/1
500 TABLET ORAL ONCE
Status: CANCELLED | OUTPATIENT
Start: 2024-10-02 | End: 2024-09-30

## 2024-09-27 RX ORDER — FAMOTIDINE 10 MG/ML
20 INJECTION, SOLUTION INTRAVENOUS
Status: CANCELLED | OUTPATIENT
Start: 2024-10-02

## 2024-09-30 ENCOUNTER — APPOINTMENT (OUTPATIENT)
Dept: RADIATION ONCOLOGY | Age: 79
End: 2024-09-30
Payer: MEDICARE

## 2024-09-30 ENCOUNTER — HOSPITAL ENCOUNTER (OUTPATIENT)
Dept: RADIATION ONCOLOGY | Age: 79
End: 2024-09-30
Payer: MEDICARE

## 2024-09-30 ENCOUNTER — TELEPHONE (OUTPATIENT)
Dept: RADIATION ONCOLOGY | Age: 79
End: 2024-09-30

## 2024-09-30 ENCOUNTER — APPOINTMENT (OUTPATIENT)
Dept: INFUSION THERAPY | Age: 79
End: 2024-09-30
Payer: MEDICARE

## 2024-09-30 DIAGNOSIS — C50.911 BREAST CANCER METASTASIZED TO SKIN, RIGHT (HCC): Primary | ICD-10-CM

## 2024-09-30 DIAGNOSIS — C79.2 BREAST CANCER METASTASIZED TO SKIN, RIGHT (HCC): Primary | ICD-10-CM

## 2024-09-30 NOTE — TELEPHONE ENCOUNTER
This RN telephoned pt to reschedule CT/SIM. Pt rescheduled for radiation planning on Wednesday 10/2/2024 at 10:00 AM, followed by Injection at 10:30 AM and follow up at 11:15 AM with Dr. Dunn. This RN's direct contact info given, advised to call with any questions or concerns.

## 2024-10-02 ENCOUNTER — OFFICE VISIT (OUTPATIENT)
Dept: ONCOLOGY | Age: 79
End: 2024-10-02
Payer: MEDICARE

## 2024-10-02 ENCOUNTER — HOSPITAL ENCOUNTER (OUTPATIENT)
Dept: INFUSION THERAPY | Age: 79
Discharge: HOME | End: 2024-10-02
Payer: MEDICARE

## 2024-10-02 ENCOUNTER — HOSPITAL ENCOUNTER (OUTPATIENT)
Dept: RADIATION ONCOLOGY | Age: 79
Discharge: HOME | End: 2024-10-02
Payer: MEDICARE

## 2024-10-02 VITALS
TEMPERATURE: 97.9 F | OXYGEN SATURATION: 98 % | DIASTOLIC BLOOD PRESSURE: 72 MMHG | SYSTOLIC BLOOD PRESSURE: 164 MMHG | HEART RATE: 75 BPM

## 2024-10-02 DIAGNOSIS — C50.411 MALIGNANT NEOPLASM OF UPPER-OUTER QUADRANT OF RIGHT BREAST IN FEMALE, ESTROGEN RECEPTOR POSITIVE (HCC): ICD-10-CM

## 2024-10-02 DIAGNOSIS — C50.911 BREAST CANCER METASTASIZED TO SKIN, RIGHT (HCC): Primary | ICD-10-CM

## 2024-10-02 DIAGNOSIS — C79.2 BREAST CANCER METASTASIZED TO SKIN, RIGHT (HCC): Primary | ICD-10-CM

## 2024-10-02 DIAGNOSIS — C50.911 INVASIVE DUCTAL CARCINOMA OF RIGHT BREAST IN FEMALE (HCC): ICD-10-CM

## 2024-10-02 DIAGNOSIS — C50.911 CHEST WALL RECURRENCE OF BREAST CANCER, RIGHT (HCC): ICD-10-CM

## 2024-10-02 DIAGNOSIS — Z17.0 MALIGNANT NEOPLASM OF UPPER-OUTER QUADRANT OF RIGHT BREAST IN FEMALE, ESTROGEN RECEPTOR POSITIVE (HCC): ICD-10-CM

## 2024-10-02 DIAGNOSIS — C79.89 CHEST WALL RECURRENCE OF BREAST CANCER, RIGHT (HCC): ICD-10-CM

## 2024-10-02 PROCEDURE — 77470 SPECIAL RADIATION TREATMENT: CPT | Performed by: RADIOLOGY

## 2024-10-02 PROCEDURE — 6360000002 HC RX W HCPCS: Performed by: INTERNAL MEDICINE

## 2024-10-02 PROCEDURE — 77334 RADIATION TREATMENT AID(S): CPT | Performed by: RADIOLOGY

## 2024-10-02 PROCEDURE — 1124F ACP DISCUSS-NO DSCNMKR DOCD: CPT | Performed by: INTERNAL MEDICINE

## 2024-10-02 PROCEDURE — 99214 OFFICE O/P EST MOD 30 MIN: CPT | Performed by: INTERNAL MEDICINE

## 2024-10-02 PROCEDURE — 77332 RADIATION TREATMENT AID(S): CPT | Performed by: RADIOLOGY

## 2024-10-02 PROCEDURE — 96402 CHEMO HORMON ANTINEOPL SQ/IM: CPT

## 2024-10-02 RX ORDER — LAMOTRIGINE 25 MG/1
500 TABLET ORAL ONCE
Status: COMPLETED | OUTPATIENT
Start: 2024-10-02 | End: 2024-10-02

## 2024-10-02 RX ADMIN — FULVESTRANT 500 MG: 50 INJECTION, SOLUTION INTRAMUSCULAR at 11:21

## 2024-10-02 NOTE — PROGRESS NOTES
Nilson Jesus Name: Tasha Waggoner  Patient : 1945  Patient MRN: [id number]     Primary Oncologist: Lona Dunn MD  PCP:Dr HILLARY Spivey  Surgeon Dr Chopra     Date of Service: 10/2/2024    Chief Complaint:   Chief Complaint   Patient presents with    Follow-up        Encounter Diagnoses   Name Primary?    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 Waggoner ( 45) has a history of right breast cancer, for which she had modified radical mastectomy in . She had a couple of local recurrences, treated with chemotherapy and radiation therapy and excision of the chest wall recurrences.  In  she had a total abdominal hysterectomy/bilateral salpingo-oophorectomy, which was done as part of hormonal therapy and Tamoxifen for one year. After that she did not show any evidence of recurrence.  In  she presented with right axillary mass which was biopsied and confirmed to be an invasive carcinoma, morphologically consistent with origin from her breast primary. Her ER was positive 90 percent, FL positive. HER-2/naomi was negative by FISH.   All other w/u including CT of the chest, abdomen and pelvis showed no evidence of any intraabdominal mass or metastases. Bone scan was negative for any bony mets. Her MUGA scan showed 61 percent ejection fraction.  She was started on Taxol d1&8 [id number] for 6 courses starting in Dec of 2012 till 2013 and showed good partial response with mass size reduced to 2.4 cm in  from 3 cm in 2012 and completed 6 courses in 2013. Right Ax mass completely resolved over next couple years.  In May 2013 she was initiated on Hormonal therapy with Arimidex 1 mg daily for maintenance treatment.   In continued Clinical

## 2024-10-02 NOTE — PROGRESS NOTES
MA Rooming Questions  Patient: Tasha Waggoner  MRN: [id number]    Date: 10/2/2024        1. Do you have any new issues?   yes - Patient states wants to know if the TX is working. Patient wants to know what the oral chemo will do. Patient wants to know osteopenia vs calcium.          2. Do you need any refills on medications?    no    3. Have you had any imaging done since your last visit?   no    4. Have you been hospitalized or seen in the emergency room since your last visit here?   no    5. Did the patient have a depression screening completed today? No    No data recorded     PHQ-9 Given to (if applicable):               PHQ-9 Score (if applicable):                     [] Positive     []  Negative              Does question #9 need addressed (if applicable)                     [] Yes    []  No               Samanta Jeffries MA

## 2024-10-02 NOTE — PROGRESS NOTES
Patient ambulated to infusion area, here today for a Faslodex injection. No concerns at this time. Treatment approved and given in bilateral dorsal gluteal. Patient tolerated well. Patient declined discharge instructions. Had an OV after injection

## 2024-10-09 ENCOUNTER — HOSPITAL ENCOUNTER (OUTPATIENT)
Dept: RADIATION ONCOLOGY | Age: 79
Discharge: HOME | End: 2024-10-09
Payer: MEDICARE

## 2024-10-09 PROCEDURE — 77385 HC NTSTY MODUL RAD TX DLVR SMPL: CPT | Performed by: RADIOLOGY

## 2024-10-09 NOTE — PROGRESS NOTES
Weekly Radiation Treatment Progress Note    DATE OF SERVICE: 10/9/2024     DIAGNOSIS:   history of R breast cancer sp mastectomy 1972 status post chemotherapy and radiation therapy followed by right axilla mass recurrence 11/2012 sp excision with R CW mass recurrence 7/2020 sp excision now with another R CW/axillary tail recurrence IDC ER+ [place]- her2- with positive margin        TREATMENT COURSE:       Site: R axilla/scv  Current Total Radiation Dose: 180 cGy    Pt doing well. Energy good.  No skin itching/soreness.      EXAM  Wt Readings from Last 3 Encounters:   09/16/24 77.1 kg (170 lb)   08/19/24 77.1 kg (170 lb)   08/02/24 73 kg (161 lb)     NAD  No desquamation    Setup images, chart, plan reviewed    A/P:   Tolerating RT well  Continue RT as planned      Electronically signed by Carter Villanueva MD on 10/9/2024 at 4:27 PM

## 2024-10-10 ENCOUNTER — HOSPITAL ENCOUNTER (OUTPATIENT)
Dept: RADIATION ONCOLOGY | Age: 79
Discharge: HOME | End: 2024-10-10
Payer: MEDICARE

## 2024-10-10 PROCEDURE — 77385 HC NTSTY MODUL RAD TX DLVR SMPL: CPT | Performed by: RADIOLOGY

## 2024-10-11 ENCOUNTER — HOSPITAL ENCOUNTER (OUTPATIENT)
Dept: RADIATION ONCOLOGY | Age: 79
Discharge: HOME | End: 2024-10-11
Payer: MEDICARE

## 2024-10-14 ENCOUNTER — HOSPITAL ENCOUNTER (OUTPATIENT)
Dept: RADIATION ONCOLOGY | Age: 79
Discharge: HOME | End: 2024-10-14
Payer: MEDICARE

## 2024-10-14 PROCEDURE — G6002 STEREOSCOPIC X-RAY GUIDANCE: HCPCS | Performed by: RADIOLOGY

## 2024-10-14 PROCEDURE — 77385 HC NTSTY MODUL RAD TX DLVR SMPL: CPT | Performed by: RADIOLOGY

## 2024-10-15 ENCOUNTER — APPOINTMENT (OUTPATIENT)
Dept: RADIATION ONCOLOGY | Age: 79
End: 2024-10-15
Payer: MEDICARE

## 2024-10-16 ENCOUNTER — HOSPITAL ENCOUNTER (OUTPATIENT)
Dept: RADIATION ONCOLOGY | Age: 79
Discharge: HOME | End: 2024-10-16
Payer: MEDICARE

## 2024-10-16 PROCEDURE — 77336 RADIATION PHYSICS CONSULT: CPT | Performed by: RADIOLOGY

## 2024-10-16 PROCEDURE — 77385 HC NTSTY MODUL RAD TX DLVR SMPL: CPT | Performed by: RADIOLOGY

## 2024-10-16 ASSESSMENT — PAIN SCALES - GENERAL: PAINLEVEL_OUTOF10: 0

## 2024-10-16 NOTE — PLAN OF CARE
Radiation education and handouts given. Side effects and management reviewed with pt. All questions answered and pt voices understanding .    Nursing Care Plan for Chest Radiation    Pain related to cancer diagnosis and treatment.    Interventions   Pain assessment.   Monitor pharmacological pain medication.   Teach relaxation and repositioning techniques.     Expected Outcome   Maintain patient's acceptable pain level or <5 on pain scale.       Knowledge deficit related to diagnosis and treatment plan.    Interventions   Assess patient's ability to comprehend diagnosis and treatment plan.   Provide educational materials and teaching regarding plan of care.   Provide emotional support and continued education.   Refer to psychosocial coordinator if further assistance needed.     Expected Outcome   Patient voices understanding of diagnosis and treatment plan.       Altered respiratory status related to diagnosis and treatment.    Interventions   Assess respiratory status, note changes.   Educate patient on symptoms to report to staff.   Refer to smoking cessation program if needed.     Expected Outcome   No respiratory complications, patient with SPO2 >90% or equal to baseline.       Altered skin integrity related to treatment.    Interventions   Evaluation of skin integrity at therapy site.   Advise patient on appropriate skin care.   Instruct patient on recommended lotions/ointments/creams/dressing changes to use on therapy site.     Expected Outcome   Minimize adverse skin reaction/infection at treatment site.       Altered nutritional status due to treatment process.    Interventions   Initial nutritional assessment.   Assess weight and intake during treatment.   Management of treatment side effects.   Refer to nutritional class/evaluation.     Expected Outcome   Patient's weight loss <10% from initial assessment.       To re-evaluate weekly during radiation treatments.

## 2024-10-17 ENCOUNTER — HOSPITAL ENCOUNTER (OUTPATIENT)
Dept: RADIATION ONCOLOGY | Age: 79
Discharge: HOME | End: 2024-10-17
Payer: MEDICARE

## 2024-10-17 PROCEDURE — 77385 HC NTSTY MODUL RAD TX DLVR SMPL: CPT | Performed by: RADIOLOGY

## 2024-10-18 ENCOUNTER — HOSPITAL ENCOUNTER (OUTPATIENT)
Dept: RADIATION ONCOLOGY | Age: 79
Discharge: HOME | End: 2024-10-18
Payer: MEDICARE

## 2024-10-18 PROCEDURE — 77385 HC NTSTY MODUL RAD TX DLVR SMPL: CPT | Performed by: RADIOLOGY

## 2024-10-19 PROBLEM — Z09 S/P EXCISION OF SKIN LESION, FOLLOW-UP EXAM: Status: RESOLVED | Noted: 2024-09-19 | Resolved: 2024-10-19

## 2024-10-21 ENCOUNTER — HOSPITAL ENCOUNTER (OUTPATIENT)
Dept: RADIATION ONCOLOGY | Age: 79
Discharge: HOME | End: 2024-10-21
Payer: MEDICARE

## 2024-10-21 PROCEDURE — 77385 HC NTSTY MODUL RAD TX DLVR SMPL: CPT | Performed by: RADIOLOGY

## 2024-10-21 PROCEDURE — G6002 STEREOSCOPIC X-RAY GUIDANCE: HCPCS | Performed by: RADIOLOGY

## 2024-10-22 ENCOUNTER — APPOINTMENT (OUTPATIENT)
Dept: RADIATION ONCOLOGY | Age: 79
End: 2024-10-22
Payer: MEDICARE

## 2024-10-23 ENCOUNTER — HOSPITAL ENCOUNTER (OUTPATIENT)
Dept: RADIATION ONCOLOGY | Age: 79
Discharge: HOME | End: 2024-10-23
Payer: MEDICARE

## 2024-10-23 PROCEDURE — 77385 HC NTSTY MODUL RAD TX DLVR SMPL: CPT | Performed by: RADIOLOGY

## 2024-10-23 NOTE — PROGRESS NOTES
Weekly Radiation Treatment Progress Note    DATE OF SERVICE: 10/23/2024     DIAGNOSIS:   history of R breast cancer sp mastectomy 1972 status post chemotherapy and radiation therapy followed by right axilla mass recurrence 11/2012 sp excision with R CW mass recurrence 7/2020 sp excision now with another R CW/axillary tail recurrence IDC ER+ [place]- her2- sp reexcision for positive margin        TREATMENT COURSE:       Site: R ax/scv  Current Total Radiation Dose: 1620 cGy  Fraction: 9/33    Pt doing well. Energy good.  No skin itching/soreness.      EXAM  Wt Readings from Last 3 Encounters:   09/16/24 77.1 kg (170 lb)   08/19/24 77.1 kg (170 lb)   08/02/24 73 kg (161 lb)     NAD  No desquamation    Setup images, chart, plan reviewed    A/P:   Tolerating RT well  Continue RT as planned      Electronically signed by Carter Villanueva MD on 10/23/2024 at 4:23 PM

## 2024-10-24 ENCOUNTER — HOSPITAL ENCOUNTER (OUTPATIENT)
Dept: RADIATION ONCOLOGY | Age: 79
Discharge: HOME | End: 2024-10-24
Payer: MEDICARE

## 2024-10-24 PROCEDURE — 77385 HC NTSTY MODUL RAD TX DLVR SMPL: CPT | Performed by: RADIOLOGY

## 2024-10-24 PROCEDURE — 77336 RADIATION PHYSICS CONSULT: CPT | Performed by: RADIOLOGY

## 2024-10-25 ENCOUNTER — HOSPITAL ENCOUNTER (OUTPATIENT)
Dept: RADIATION ONCOLOGY | Age: 79
Discharge: HOME | End: 2024-10-25
Payer: MEDICARE

## 2024-10-25 DIAGNOSIS — C50.911 BREAST CANCER METASTASIZED TO SKIN, RIGHT (HCC): Primary | ICD-10-CM

## 2024-10-25 DIAGNOSIS — C79.2 BREAST CANCER METASTASIZED TO SKIN, RIGHT (HCC): Primary | ICD-10-CM

## 2024-10-25 RX ORDER — ALBUTEROL SULFATE 90 UG/1
4 INHALANT RESPIRATORY (INHALATION) PRN
OUTPATIENT
Start: 2024-10-28

## 2024-10-25 RX ORDER — SODIUM CHLORIDE 9 MG/ML
INJECTION, SOLUTION INTRAVENOUS CONTINUOUS
OUTPATIENT
Start: 2024-10-28

## 2024-10-25 RX ORDER — ACETAMINOPHEN 325 MG/1
650 TABLET ORAL
OUTPATIENT
Start: 2024-10-28

## 2024-10-25 RX ORDER — FAMOTIDINE 10 MG/ML
20 INJECTION, SOLUTION INTRAVENOUS
OUTPATIENT
Start: 2024-10-28

## 2024-10-25 RX ORDER — EPINEPHRINE 1 MG/ML
0.3 INJECTION, SOLUTION, CONCENTRATE INTRAVENOUS PRN
OUTPATIENT
Start: 2024-10-28

## 2024-10-25 RX ORDER — LAMOTRIGINE 25 MG/1
500 TABLET ORAL ONCE
OUTPATIENT
Start: 2024-10-28 | End: 2024-10-28

## 2024-10-25 RX ORDER — ONDANSETRON 2 MG/ML
8 INJECTION INTRAMUSCULAR; INTRAVENOUS
OUTPATIENT
Start: 2024-10-28

## 2024-10-25 RX ORDER — MEPERIDINE HYDROCHLORIDE 50 MG/ML
12.5 INJECTION INTRAMUSCULAR; INTRAVENOUS; SUBCUTANEOUS PRN
OUTPATIENT
Start: 2024-10-28

## 2024-10-25 RX ORDER — DIPHENHYDRAMINE HYDROCHLORIDE 50 MG/ML
50 INJECTION INTRAMUSCULAR; INTRAVENOUS
OUTPATIENT
Start: 2024-10-28

## 2024-10-28 ENCOUNTER — HOSPITAL ENCOUNTER (OUTPATIENT)
Dept: RADIATION ONCOLOGY | Age: 79
Discharge: HOME | End: 2024-10-28
Payer: MEDICARE

## 2024-10-28 PROCEDURE — 77385 HC NTSTY MODUL RAD TX DLVR SMPL: CPT | Performed by: RADIOLOGY

## 2024-10-29 ENCOUNTER — HOSPITAL ENCOUNTER (OUTPATIENT)
Dept: RADIATION ONCOLOGY | Age: 79
Discharge: HOME | End: 2024-10-29
Payer: MEDICARE

## 2024-10-29 PROCEDURE — 77385 HC NTSTY MODUL RAD TX DLVR SMPL: CPT | Performed by: RADIOLOGY

## 2024-10-30 ENCOUNTER — HOSPITAL ENCOUNTER (OUTPATIENT)
Dept: RADIATION ONCOLOGY | Age: 79
Discharge: HOME | End: 2024-10-30
Payer: MEDICARE

## 2024-10-30 ENCOUNTER — HOSPITAL ENCOUNTER (OUTPATIENT)
Dept: INFUSION THERAPY | Age: 79
Discharge: HOME | End: 2024-10-30
Payer: MEDICARE

## 2024-10-30 DIAGNOSIS — C50.411 MALIGNANT NEOPLASM OF UPPER-OUTER QUADRANT OF RIGHT BREAST IN FEMALE, ESTROGEN RECEPTOR POSITIVE (HCC): ICD-10-CM

## 2024-10-30 DIAGNOSIS — C79.2 BREAST CANCER METASTASIZED TO SKIN, RIGHT (HCC): Primary | ICD-10-CM

## 2024-10-30 DIAGNOSIS — C50.911 BREAST CANCER METASTASIZED TO SKIN, RIGHT (HCC): Primary | ICD-10-CM

## 2024-10-30 DIAGNOSIS — Z17.0 MALIGNANT NEOPLASM OF UPPER-OUTER QUADRANT OF RIGHT BREAST IN FEMALE, ESTROGEN RECEPTOR POSITIVE (HCC): ICD-10-CM

## 2024-10-30 LAB
ALBUMIN SERPL-MCNC: 4.5 G/DL (ref 3.4–5)
ALBUMIN/GLOB SERPL: 2 {RATIO} (ref 1.1–2.2)
ALP SERPL-CCNC: 69 U/L (ref 40–129)
ALT SERPL-CCNC: 21 U/L (ref 10–40)
ANION GAP SERPL CALCULATED.3IONS-SCNC: 13 MMOL/L (ref 9–17)
AST SERPL-CCNC: 23 U/L (ref 15–37)
BASOPHILS # BLD: 0.02 K/UL
BASOPHILS NFR BLD: 0 % (ref 0–1)
BILIRUB SERPL-MCNC: 0.3 MG/DL (ref 0–1)
BUN SERPL-MCNC: 29 MG/DL (ref 7–20)
CALCIUM SERPL-MCNC: 10.9 MG/DL (ref 8.3–10.6)
CHLORIDE SERPL-SCNC: 100 MMOL/L (ref 99–110)
CO2 SERPL-SCNC: 26 MMOL/L (ref 21–32)
CREAT SERPL-MCNC: 1 MG/DL (ref 0.6–1.2)
EOSINOPHIL # BLD: 0.06 K/UL
EOSINOPHILS RELATIVE PERCENT: 1 % (ref 0–3)
ERYTHROCYTE [DISTWIDTH] IN BLOOD BY AUTOMATED COUNT: 12.4 % (ref 11.7–14.9)
GFR, ESTIMATED: 54 ML/MIN/1.73M2
GLUCOSE SERPL-MCNC: 87 MG/DL (ref 74–99)
HCT VFR BLD AUTO: 42.1 % (ref 37–47)
HGB BLD-MCNC: 14.3 G/DL (ref 12.5–16)
LYMPHOCYTES NFR BLD: 0.88 K/UL
LYMPHOCYTES RELATIVE PERCENT: 14 % (ref 24–44)
MCH RBC QN AUTO: 35.4 PG (ref 27–31)
MCHC RBC AUTO-ENTMCNC: 34 G/DL (ref 32–36)
MCV RBC AUTO: 104.2 FL (ref 78–100)
MONOCYTES NFR BLD: 0.72 K/UL
MONOCYTES NFR BLD: 12 % (ref 0–4)
NEUTROPHILS NFR BLD: 73 % (ref 36–66)
NEUTS SEG NFR BLD: 4.43 K/UL
PLATELET # BLD AUTO: 207 K/UL (ref 140–440)
PMV BLD AUTO: 9.2 FL (ref 7.5–11.1)
POTASSIUM SERPL-SCNC: 4.4 MMOL/L (ref 3.5–5.1)
PROT SERPL-MCNC: 6.7 G/DL (ref 6.4–8.2)
RBC # BLD AUTO: 4.04 M/UL (ref 4.2–5.4)
SODIUM SERPL-SCNC: 138 MMOL/L (ref 136–145)
WBC OTHER # BLD: 6.1 K/UL (ref 4–10.5)

## 2024-10-30 PROCEDURE — 85025 COMPLETE CBC W/AUTO DIFF WBC: CPT

## 2024-10-30 PROCEDURE — 77385 HC NTSTY MODUL RAD TX DLVR SMPL: CPT | Performed by: RADIOLOGY

## 2024-10-30 PROCEDURE — 6360000002 HC RX W HCPCS: Performed by: INTERNAL MEDICINE

## 2024-10-30 PROCEDURE — 80053 COMPREHEN METABOLIC PANEL: CPT

## 2024-10-30 PROCEDURE — 86300 IMMUNOASSAY TUMOR CA 15-3: CPT

## 2024-10-30 PROCEDURE — 36415 COLL VENOUS BLD VENIPUNCTURE: CPT

## 2024-10-30 PROCEDURE — 96402 CHEMO HORMON ANTINEOPL SQ/IM: CPT

## 2024-10-30 RX ORDER — LAMOTRIGINE 25 MG/1
500 TABLET ORAL ONCE
Status: COMPLETED | OUTPATIENT
Start: 2024-10-30 | End: 2024-10-30

## 2024-10-30 RX ADMIN — FULVESTRANT 500 MG: 50 INJECTION, SOLUTION INTRAMUSCULAR at 15:28

## 2024-10-30 NOTE — PROGRESS NOTES
Weekly Radiation Treatment Progress Note    DATE OF SERVICE: 10/30/2024     DIAGNOSIS:    history of R breast cancer sp mastectomy 1972 status post chemotherapy and radiation therapy followed by right axilla mass recurrence 11/2012 sp excision with R CW mass recurrence 7/2020 sp excision now with another R CW/axillary tail recurrence IDC ER+ [place]- her2- sp reexcision for positive margin        TREATMENT COURSE:       Site: r ax/scv   Current Total Radiation Dose: 2520 cGy  Fraction: 14/28    Pt doing well. Some fatigue  No skin itching/soreness.      EXAM  Wt Readings from Last 3 Encounters:   09/16/24 77.1 kg (170 lb)   08/19/24 77.1 kg (170 lb)   08/02/24 73 kg (161 lb)     NAD  No desquamation    Setup images, chart, plan reviewed    A/P:   Tolerating RT well  Continue RT as planned      Electronically signed by Carter Villanueva MD on 10/30/2024 at 4:13 PM

## 2024-10-31 ENCOUNTER — HOSPITAL ENCOUNTER (OUTPATIENT)
Dept: RADIATION ONCOLOGY | Age: 79
Discharge: HOME | End: 2024-10-31
Payer: MEDICARE

## 2024-10-31 PROCEDURE — 77336 RADIATION PHYSICS CONSULT: CPT | Performed by: RADIOLOGY

## 2024-10-31 PROCEDURE — 77385 HC NTSTY MODUL RAD TX DLVR SMPL: CPT | Performed by: RADIOLOGY

## 2024-11-01 ENCOUNTER — HOSPITAL ENCOUNTER (OUTPATIENT)
Dept: RADIATION ONCOLOGY | Age: 79
Discharge: HOME | End: 2024-11-01
Payer: MEDICARE

## 2024-11-01 PROBLEM — Z12.31 ENCOUNTER FOR SCREENING MAMMOGRAM FOR HIGH-RISK PATIENT: Status: RESOLVED | Noted: 2021-12-14 | Resolved: 2024-11-01

## 2024-11-01 PROCEDURE — 77385 HC NTSTY MODUL RAD TX DLVR SMPL: CPT | Performed by: RADIOLOGY

## 2024-11-04 ENCOUNTER — HOSPITAL ENCOUNTER (OUTPATIENT)
Dept: RADIATION ONCOLOGY | Age: 79
Discharge: HOME | End: 2024-11-04
Payer: MEDICARE

## 2024-11-04 PROCEDURE — G6002 STEREOSCOPIC X-RAY GUIDANCE: HCPCS | Performed by: RADIOLOGY

## 2024-11-04 PROCEDURE — 77385 HC NTSTY MODUL RAD TX DLVR SMPL: CPT | Performed by: RADIOLOGY

## 2024-11-05 ENCOUNTER — HOSPITAL ENCOUNTER (OUTPATIENT)
Dept: RADIATION ONCOLOGY | Age: 79
Discharge: HOME | End: 2024-11-05
Payer: MEDICARE

## 2024-11-05 PROCEDURE — 77385 HC NTSTY MODUL RAD TX DLVR SMPL: CPT | Performed by: RADIOLOGY

## 2024-11-06 ENCOUNTER — HOSPITAL ENCOUNTER (OUTPATIENT)
Dept: INFUSION THERAPY | Age: 79
Discharge: HOME | End: 2024-11-06
Payer: MEDICARE

## 2024-11-06 ENCOUNTER — OFFICE VISIT (OUTPATIENT)
Dept: ONCOLOGY | Age: 79
End: 2024-11-06
Payer: MEDICARE

## 2024-11-06 ENCOUNTER — HOSPITAL ENCOUNTER (OUTPATIENT)
Dept: RADIATION ONCOLOGY | Age: 79
Discharge: HOME | End: 2024-11-06
Payer: MEDICARE

## 2024-11-06 VITALS
TEMPERATURE: 98.1 F | BODY MASS INDEX: 28.29 KG/M2 | OXYGEN SATURATION: 98 % | HEIGHT: 65 IN | DIASTOLIC BLOOD PRESSURE: 68 MMHG | SYSTOLIC BLOOD PRESSURE: 128 MMHG | HEART RATE: 97 BPM

## 2024-11-06 DIAGNOSIS — Z17.0 MALIGNANT NEOPLASM OF UPPER-OUTER QUADRANT OF RIGHT BREAST IN FEMALE, ESTROGEN RECEPTOR POSITIVE (HCC): Primary | ICD-10-CM

## 2024-11-06 DIAGNOSIS — C50.911 BREAST CANCER METASTASIZED TO SKIN, RIGHT (HCC): ICD-10-CM

## 2024-11-06 DIAGNOSIS — C79.89 CHEST WALL RECURRENCE OF BREAST CANCER, RIGHT (HCC): ICD-10-CM

## 2024-11-06 DIAGNOSIS — C79.2 BREAST CANCER METASTASIZED TO SKIN, RIGHT (HCC): ICD-10-CM

## 2024-11-06 DIAGNOSIS — C50.911 CHEST WALL RECURRENCE OF BREAST CANCER, RIGHT (HCC): ICD-10-CM

## 2024-11-06 DIAGNOSIS — C50.411 MALIGNANT NEOPLASM OF UPPER-OUTER QUADRANT OF RIGHT BREAST IN FEMALE, ESTROGEN RECEPTOR POSITIVE (HCC): Primary | ICD-10-CM

## 2024-11-06 LAB — CANCER AG27-29 SERPL-ACNC: 17 U/ML (ref 0–38)

## 2024-11-06 PROCEDURE — 1159F MED LIST DOCD IN RCRD: CPT | Performed by: INTERNAL MEDICINE

## 2024-11-06 PROCEDURE — 77385 HC NTSTY MODUL RAD TX DLVR SMPL: CPT | Performed by: RADIOLOGY

## 2024-11-06 PROCEDURE — 99214 OFFICE O/P EST MOD 30 MIN: CPT | Performed by: INTERNAL MEDICINE

## 2024-11-06 PROCEDURE — 99211 OFF/OP EST MAY X REQ PHY/QHP: CPT

## 2024-11-06 PROCEDURE — 1124F ACP DISCUSS-NO DSCNMKR DOCD: CPT | Performed by: INTERNAL MEDICINE

## 2024-11-06 PROCEDURE — 1125F AMNT PAIN NOTED PAIN PRSNT: CPT | Performed by: INTERNAL MEDICINE

## 2024-11-06 RX ORDER — LETROZOLE 2.5 MG/1
2.5 TABLET, FILM COATED ORAL DAILY
Qty: 90 TABLET | Refills: 5 | Status: SHIPPED | OUTPATIENT
Start: 2024-11-06 | End: 2026-04-30

## 2024-11-06 NOTE — PROGRESS NOTES
MA Rooming Questions  Patient: Tasha Waggoner  MRN: [id number]    Date: 11/6/2024        1. Do you have any new issues?   yes - Patient states \"I do not think this treatment is working, it is making me weak, grumpy, shaky and negative\" patient also voices concerns about decreased appetite since starting injections.          2. Do you need any refills on medications?    no    3. Have you had any imaging done since your last visit?   yes - labs 10/30    4. Have you been hospitalized or seen in the emergency room since your last visit here?   no    5. Did the patient have a depression screening completed today? No    No data recorded     PHQ-9 Given to (if applicable):               PHQ-9 Score (if applicable):                     [] Positive     []  Negative              Does question #9 need addressed (if applicable)                     [] Yes    []  No               Sienna Mims CMA    
  History and physical and periodic evaluation of CA 27-29.  Imaging studies as needed    LE edema/lymphedema:Compression devise.Is on diuretics,follows with Dr Micki Womack disease: is followed by endocrinology.     Hypertension: Monitor BP 2 times a day at home and log. Recommend low salt diet and exercise as tolerated. Discuss with PCP or Cardiologist if remains high.    Continue other medical care    Discussed the above findings and plan with her and she verbalized understanding    Discussed Lifestyle, healthy diet and regular exercise as tolerated. Also discussed importance of being up-to-date with age-appropriate screening tools, Left mammogram due again July 2022.Colonoscopy august 2021 with diverticulitis, hemorrhoids and one polyp.     Recommend follow-up with primary care physician and other specialists.    Please do not hesitate to contact us if you need ay further information.     Return to clinic after starting AI and Ribociclib or earlier if new symptoms.    This note is created with the assistance of a speech-recognition program. While intending to generate a document that accurately reflects the content of the visit, no guarantee can be provided that every mistake has been identified and corrected by editing.    Portions of this note are copied forward from previous clinic note.  Interval history, ROS, physical exam, assessment and plan has been reviewed and updated for accuracy by this provider.    Time Spent with patient for face to face, exam, education, discussing treatment options, reviewing imaging, reviewing labs, decision making, Pre-charting, and documenting today's visit, >30 mins.     CATALINA

## 2024-11-07 ENCOUNTER — TELEPHONE (OUTPATIENT)
Dept: INFUSION THERAPY | Age: 79
End: 2024-11-07

## 2024-11-07 ENCOUNTER — CLINICAL DOCUMENTATION (OUTPATIENT)
Dept: ONCOLOGY | Age: 79
End: 2024-11-07

## 2024-11-07 ENCOUNTER — HOSPITAL ENCOUNTER (OUTPATIENT)
Dept: RADIATION ONCOLOGY | Age: 79
Discharge: HOME | End: 2024-11-07
Payer: MEDICARE

## 2024-11-07 DIAGNOSIS — Z17.0 MALIGNANT NEOPLASM OF UPPER-OUTER QUADRANT OF RIGHT BREAST IN FEMALE, ESTROGEN RECEPTOR POSITIVE (HCC): Primary | ICD-10-CM

## 2024-11-07 DIAGNOSIS — C50.411 MALIGNANT NEOPLASM OF UPPER-OUTER QUADRANT OF RIGHT BREAST IN FEMALE, ESTROGEN RECEPTOR POSITIVE (HCC): Primary | ICD-10-CM

## 2024-11-07 DIAGNOSIS — C50.911 BREAST CANCER METASTASIZED TO SKIN, RIGHT (HCC): ICD-10-CM

## 2024-11-07 DIAGNOSIS — C79.2 BREAST CANCER METASTASIZED TO SKIN, RIGHT (HCC): ICD-10-CM

## 2024-11-07 PROCEDURE — 77385 HC NTSTY MODUL RAD TX DLVR SMPL: CPT | Performed by: RADIOLOGY

## 2024-11-07 PROCEDURE — 77336 RADIATION PHYSICS CONSULT: CPT | Performed by: RADIOLOGY

## 2024-11-07 RX ORDER — RIBOCICLIB 200 MG/1
600 TABLET, FILM COATED ORAL DAILY
Qty: 63 EACH | Refills: 11 | Status: SHIPPED | OUTPATIENT
Start: 2024-11-07

## 2024-11-07 NOTE — TELEPHONE ENCOUNTER
explained that I'm assisting with the financial help for her Kisqali. I emailed the [employer] application to Anna Martines to obtain signatures and proof of income. The patient will meet with Anna on her next radiation appt. advised to call first.

## 2024-11-07 NOTE — PROGRESS NOTES
Per Dr. Dunn - patient will not start taking the Truqap and does not like the Faslodex injections - d/c'd from plan per pharmacist.  Per Dr. Dunn - patient to start on Letrozole with Kisqali 600 mg daily (will start with 200 mg and dose escalate weekly to 600 mg).  Kisqali 600 mg (200 mg tabs) daily #63 with 11 refills e-scripted to [facility] to begin the prior auth process.  Nurse navigator already placed.  Will bring patient in for education once she receives the Kisqali.

## 2024-11-08 ENCOUNTER — HOSPITAL ENCOUNTER (OUTPATIENT)
Dept: RADIATION ONCOLOGY | Age: 79
Discharge: HOME | End: 2024-11-08
Payer: MEDICARE

## 2024-11-11 ENCOUNTER — HOSPITAL ENCOUNTER (OUTPATIENT)
Dept: RADIATION ONCOLOGY | Age: 79
Discharge: HOME | End: 2024-11-11
Payer: MEDICARE

## 2024-11-11 PROCEDURE — 77385 HC NTSTY MODUL RAD TX DLVR SMPL: CPT | Performed by: RADIOLOGY

## 2024-11-12 ENCOUNTER — HOSPITAL ENCOUNTER (OUTPATIENT)
Dept: RADIATION ONCOLOGY | Age: 79
Discharge: HOME | End: 2024-11-12
Payer: MEDICARE

## 2024-11-12 PROCEDURE — 77385 HC NTSTY MODUL RAD TX DLVR SMPL: CPT | Performed by: RADIOLOGY

## 2024-11-13 ENCOUNTER — HOSPITAL ENCOUNTER (OUTPATIENT)
Dept: RADIATION ONCOLOGY | Age: 79
Discharge: HOME | End: 2024-11-13
Payer: MEDICARE

## 2024-11-13 DIAGNOSIS — C79.2 BREAST CANCER METASTASIZED TO SKIN, RIGHT (HCC): ICD-10-CM

## 2024-11-13 DIAGNOSIS — C50.911 INVASIVE DUCTAL CARCINOMA OF RIGHT BREAST IN FEMALE (HCC): Primary | ICD-10-CM

## 2024-11-13 DIAGNOSIS — L58.0 ACUTE RADIATION DERMATITIS: ICD-10-CM

## 2024-11-13 DIAGNOSIS — C50.911 BREAST CANCER METASTASIZED TO SKIN, RIGHT (HCC): ICD-10-CM

## 2024-11-13 PROCEDURE — 77385 HC NTSTY MODUL RAD TX DLVR SMPL: CPT | Performed by: RADIOLOGY

## 2024-11-13 RX ORDER — SILVER SULFADIAZINE 10 MG/G
CREAM TOPICAL
Qty: 50 G | Refills: 1 | Status: SHIPPED | OUTPATIENT
Start: 2024-11-13

## 2024-11-13 ASSESSMENT — PAIN DESCRIPTION - LOCATION: LOCATION: CHEST

## 2024-11-13 ASSESSMENT — PAIN DESCRIPTION - PAIN TYPE: TYPE: ACUTE PAIN

## 2024-11-13 ASSESSMENT — PAIN DESCRIPTION - FREQUENCY: FREQUENCY: INTERMITTENT

## 2024-11-13 ASSESSMENT — PAIN DESCRIPTION - DESCRIPTORS: DESCRIPTORS: BURNING;SORE

## 2024-11-13 ASSESSMENT — PAIN SCALES - GENERAL: PAINLEVEL_OUTOF10: 2

## 2024-11-13 ASSESSMENT — PAIN - FUNCTIONAL ASSESSMENT: PAIN_FUNCTIONAL_ASSESSMENT: ACTIVITIES ARE NOT PREVENTED

## 2024-11-13 NOTE — PLAN OF CARE
Care plan reviewed.     _Pt c/o some increased fatigue, tolerating ADLs without difficulty.    _Erythema and desquamation in SCV area, Rx sent to [facility] for Silvadene as prescribed by Dr. Villanueva.

## 2024-11-13 NOTE — PROGRESS NOTES
Weekly Radiation Treatment Progress Note    DATE OF SERVICE: 11/13/2024     DIAGNOSIS:  history of R breast cancer sp mastectomy 1972 status post chemotherapy and radiation therapy followed by right axilla mass recurrence 11/2012 sp excision with R CW mass recurrence 7/2020 sp excision now with another R CW/axillary tail recurrence IDC ER+ [place]- her2- sp reexcision for positive margin        TREATMENT COURSE:         Site: R ax/scv   Current Total Radiation Dose: 4320 cGy  Fraction: 24/33    Pt doing well. Energy good.  Erythema and desq scv area.       EXAM  Wt Readings from Last 3 Encounters:   09/16/24 77.1 kg (170 lb)   08/19/24 77.1 kg (170 lb)   08/02/24 73 kg (161 lb)     NAD  Mild Skin erythema. No desquamation    Setup images, chart, plan reviewed    A/P:   Tolerating RT well  Continue RT as planned  Silvadene for small peeling area scv.     Electronically signed by Carter Villanueva MD on 11/13/2024 at 4:00 PM

## 2024-11-14 ENCOUNTER — TELEPHONE (OUTPATIENT)
Dept: ONCOLOGY | Age: 79
End: 2024-11-14

## 2024-11-14 ENCOUNTER — HOSPITAL ENCOUNTER (OUTPATIENT)
Dept: RADIATION ONCOLOGY | Age: 79
Discharge: HOME | End: 2024-11-14
Payer: MEDICARE

## 2024-11-14 PROCEDURE — 77385 HC NTSTY MODUL RAD TX DLVR SMPL: CPT | Performed by: RADIOLOGY

## 2024-11-14 PROCEDURE — 77336 RADIATION PHYSICS CONSULT: CPT | Performed by: RADIOLOGY

## 2024-11-15 ENCOUNTER — APPOINTMENT (OUTPATIENT)
Dept: RADIATION ONCOLOGY | Age: 79
End: 2024-11-15
Payer: MEDICARE

## 2024-11-15 NOTE — TELEPHONE ENCOUNTER
This RN called patient to inform to not start the Letrozole until after she completes radiation.  Patient voiced understanding.

## 2024-11-18 ENCOUNTER — APPOINTMENT (OUTPATIENT)
Dept: RADIATION ONCOLOGY | Age: 79
End: 2024-11-18
Payer: MEDICARE

## 2024-11-18 ENCOUNTER — HOSPITAL ENCOUNTER (OUTPATIENT)
Dept: RADIATION ONCOLOGY | Age: 79
Discharge: HOME | End: 2024-11-18
Payer: MEDICARE

## 2024-11-18 ENCOUNTER — TELEPHONE (OUTPATIENT)
Dept: INFUSION THERAPY | Age: 79
End: 2024-11-18

## 2024-11-18 NOTE — PROGRESS NOTES
Radiation Oncology  Treatment Completion Summary  Encounter Date: 2024 4:08 PM    Ms. Tasha Waggoner is a 79 y.o. female  : 1945  MRN: [id number]  [id number]    FOLLOW UP PHYSICIANS:   Primary Care: Khanh Spivey MD   Medical Oncologist: Dr. Dunn    DIAGNOSIS: history of R breast cancer sp mastectomy 1972 status post chemotherapy and radiation therapy followed by right axilla mass recurrence 2012 sp excision with R CW mass recurrence 2020 sp excision now with another R CW/axillary tail recurrence IDC ER+ [place]- her2- sp reexcision for positive margin     TREATMENT COURSE:     HISTORY:  Tasha Waggoner is a 79 y.o. female with the above referenced diagnosis.  Complete details on history of present illness please see my initial consultation note.  She has recently completed a course of adjuvant  radiation therapy and what follows is a description of the treatments she received:    ANATOMIC SITE: R ax/scv   BEAM ORIENTATION: vmat  ENERGY: 6MV photons  DOSE PER FRACTION: 180cGy  TOTAL DOSE: 4500cGy; 25/33 planned fractions.     ELAPSED DAYS: 10/9/24 - 24    TREATMENT TOLERANCE:   Had some desquamation scv area, offered to switch to boost and be done there or finish planned fractions after but she elected to stop treatment at 25 fractions. Will see back in 4 weeks for follow up.     FOLLOW-UP PLANS:   She is to return to see me in 1 month or sooner as needed.    Electronically signed by Carter Villanueva MD on 2024 at 4:08 PM

## 2024-11-18 NOTE — TELEPHONE ENCOUNTER
I called to inform the patient that [facility] approved the Kisqali at no cost until 12/31/24 and to expect a call to arrange the shipment.

## 2024-11-18 NOTE — PROGRESS NOTES
Weekly Radiation Treatment Progress Note    DATE OF SERVICE: 11/18/2024     DIAGNOSIS: history of R breast cancer sp mastectomy 1972 status post chemotherapy and radiation therapy followed by right axilla mass recurrence 11/2012 sp excision with R CW mass recurrence 7/2020 sp excision now with another R CW/axillary tail recurrence IDC ER+ [place]- her2- sp reexcision for positive margin        TREATMENT COURSE:         Site: R ax/scv  Current Total Radiation Dose: 4500 cGy  Fraction: 25/33    Doing okay. Desquamation in scv area.     EXAM  Wt Readings from Last 3 Encounters:   09/16/24 77.1 kg (170 lb)   08/19/24 77.1 kg (170 lb)   08/02/24 73 kg (161 lb)     NAD    Setup images, chart, plan reviewed    A/P:   Tolerating RT well  She doesn't want to continue treatment. Offered doing boost and then revisiting finishing the scv/ax but wishes to stop today. Will see back in 4 weeks.       Electronically signed by Carter Villanueva MD on 11/18/2024 at 4:07 PM

## 2024-11-19 ENCOUNTER — APPOINTMENT (OUTPATIENT)
Dept: RADIATION ONCOLOGY | Age: 79
End: 2024-11-19
Payer: MEDICARE

## 2024-11-19 ENCOUNTER — TELEPHONE (OUTPATIENT)
Dept: INFUSION THERAPY | Age: 79
End: 2024-11-19

## 2024-11-19 NOTE — TELEPHONE ENCOUNTER
The patient called and I informed her that the Kisqali was approved at no cost. She said that the Doctors may discontinue all treatment due to issues with radiation. I informed her that I will monitor the future notes.

## 2024-11-20 ENCOUNTER — APPOINTMENT (OUTPATIENT)
Dept: RADIATION ONCOLOGY | Age: 79
End: 2024-11-20
Payer: MEDICARE

## 2024-11-21 ENCOUNTER — APPOINTMENT (OUTPATIENT)
Dept: RADIATION ONCOLOGY | Age: 79
End: 2024-11-21
Payer: MEDICARE

## 2024-11-22 ENCOUNTER — TELEPHONE (OUTPATIENT)
Dept: INFUSION THERAPY | Age: 79
End: 2024-11-22

## 2024-11-22 ENCOUNTER — APPOINTMENT (OUTPATIENT)
Dept: RADIATION ONCOLOGY | Age: 79
End: 2024-11-22
Payer: MEDICARE

## 2024-11-22 NOTE — TELEPHONE ENCOUNTER
The patient returned my call and stated that she wanted to have her 12/2/24 appointment with the oncologist to discuss whether or not the Kisqali will be part of the treatment plan.

## 2024-11-25 ENCOUNTER — APPOINTMENT (OUTPATIENT)
Dept: RADIATION ONCOLOGY | Age: 79
End: 2024-11-25
Payer: MEDICARE

## 2024-11-26 ENCOUNTER — APPOINTMENT (OUTPATIENT)
Dept: RADIATION ONCOLOGY | Age: 79
End: 2024-11-26
Payer: MEDICARE

## 2024-12-02 DIAGNOSIS — C79.2 BREAST CANCER METASTASIZED TO SKIN, RIGHT (HCC): ICD-10-CM

## 2024-12-02 DIAGNOSIS — C50.911 BREAST CANCER METASTASIZED TO SKIN, RIGHT (HCC): ICD-10-CM

## 2024-12-02 DIAGNOSIS — L58.0 ACUTE RADIATION DERMATITIS: ICD-10-CM

## 2024-12-02 RX ORDER — SILVER SULFADIAZINE 10 MG/G
CREAM TOPICAL
Qty: 50 G | Refills: 1 | OUTPATIENT
Start: 2024-12-02

## 2024-12-04 ENCOUNTER — HOSPITAL ENCOUNTER (OUTPATIENT)
Dept: INFUSION THERAPY | Age: 79
Discharge: HOME | End: 2024-12-04
Payer: MEDICARE

## 2024-12-04 ENCOUNTER — OFFICE VISIT (OUTPATIENT)
Dept: ONCOLOGY | Age: 79
End: 2024-12-04
Payer: MEDICARE

## 2024-12-04 VITALS
HEART RATE: 78 BPM | HEIGHT: 65 IN | OXYGEN SATURATION: 98 % | TEMPERATURE: 97.6 F | DIASTOLIC BLOOD PRESSURE: 68 MMHG | WEIGHT: 200 LBS | SYSTOLIC BLOOD PRESSURE: 134 MMHG | BODY MASS INDEX: 33.32 KG/M2

## 2024-12-04 DIAGNOSIS — Z17.0 MALIGNANT NEOPLASM OF UPPER-OUTER QUADRANT OF RIGHT BREAST IN FEMALE, ESTROGEN RECEPTOR POSITIVE (HCC): Primary | ICD-10-CM

## 2024-12-04 DIAGNOSIS — C50.911 CHEST WALL RECURRENCE OF BREAST CANCER, RIGHT (HCC): ICD-10-CM

## 2024-12-04 DIAGNOSIS — C79.89 CHEST WALL RECURRENCE OF BREAST CANCER, RIGHT (HCC): ICD-10-CM

## 2024-12-04 DIAGNOSIS — C50.411 MALIGNANT NEOPLASM OF UPPER-OUTER QUADRANT OF RIGHT BREAST IN FEMALE, ESTROGEN RECEPTOR POSITIVE (HCC): Primary | ICD-10-CM

## 2024-12-04 DIAGNOSIS — E05.00 GRAVES DISEASE: ICD-10-CM

## 2024-12-04 PROCEDURE — 1124F ACP DISCUSS-NO DSCNMKR DOCD: CPT | Performed by: INTERNAL MEDICINE

## 2024-12-04 PROCEDURE — 99214 OFFICE O/P EST MOD 30 MIN: CPT | Performed by: INTERNAL MEDICINE

## 2024-12-04 PROCEDURE — 1159F MED LIST DOCD IN RCRD: CPT | Performed by: INTERNAL MEDICINE

## 2024-12-04 PROCEDURE — 99211 OFF/OP EST MAY X REQ PHY/QHP: CPT

## 2024-12-04 PROCEDURE — 1125F AMNT PAIN NOTED PAIN PRSNT: CPT | Performed by: INTERNAL MEDICINE

## 2024-12-04 NOTE — PROGRESS NOTES
MA Rooming Questions  Patient: Tasha Waggoner  MRN: [id number]    Date: 12/4/2024        1. Do you have any new issues?   yes - Patient states is having some swelling today. Patient states this is constant. Patient also states has some radiation burns on right side near collar bone.          2. Do you need any refills on medications?    no    3. Have you had any imaging done since your last visit?   no    4. Have you been hospitalized or seen in the emergency room since your last visit here?   yes - Er 11/16    5. Did the patient have a depression screening completed today? No    No data recorded     PHQ-9 Given to (if applicable):               PHQ-9 Score (if applicable):                     [] Positive     []  Negative              Does question #9 need addressed (if applicable)                     [] Yes    []  No               Samanta Jeffries MA    
results found for: \"ESR\"      Chemistry:  Lab Results   Component Value Date     10/30/2024    K 4.4 10/30/2024     10/30/2024    CO2 26 10/30/2024    BUN 29 (H) 10/30/2024    CREATININE 1.0 10/30/2024    GLUCOSE 87 10/30/2024    CALCIUM 10.9 (H) 10/30/2024    BILITOT 0.3 10/30/2024    ALKPHOS 69 10/30/2024    AST 23 10/30/2024    ALT 21 10/30/2024    LABGLOM 54 (L) 10/30/2024    GFRAA >60 07/20/2022    GLOB 1.8 (L) 01/18/2023    PHOS 3.0 01/18/2023    MG 2.1 01/18/2023    POCGLU 90 12/14/2020     Lab Results   Component Value Date     10/17/2017     No results found for: \"LD\"  Lab Results   Component Value Date    TSHHS 2.780 12/15/2020    T4FREE 1.32 12/15/2020    FT3 3.2 04/27/2017       Immunology:  No results found for: \"SPEP\", \"ALBUMINELP\", \"LABALPH\", \"LABBETA\", \"GAMGLOB\"    No results found for: \"KAPPAUVOL\", \"LAMBDAUVOL\", \"KLFLCR\"  No results found for: \"B2M\"    Coagulation Panel:  Lab Results   Component Value Date    PROTIME 10.9 (L) 12/14/2020    INR 0.90 12/14/2020    APTT 23.8 04/27/2017    DDIMER 919 (H) 12/12/2016       Anemia Panel:  Lab Results   Component Value Date    AZIUSETY75 579.2 12/15/2020    FOLATE 19.1 (H) 12/15/2020       Tumor Markers:  Lab Results   Component Value Date    CEA 2.1 01/22/2019    LABCA2 <9.0 04/23/2024         Imaging: Reviewed     Pathology:Reviewed     Observations:  Performance Status: ECOG 2   Depression Status: PHQ 9 neg       Assessment & Plan:  1. Ca Breast 1972 with Right Ax Recurrence Nov 2012   Initial diagnosis in 1972 when she had right modified radical mastectomy.   She had isolated local recurrences in the early eighties, for which she had excision, followed by radiation therapy and hormonal therapy.   After being off treatment for many years, she had another recurrence in November of 2012 and after excision, she had minimal residual disease and has been on maintenance Arimidex for five years after initial chemotherapy with a taxane.

## 2024-12-17 ENCOUNTER — TELEPHONE (OUTPATIENT)
Dept: INFUSION THERAPY | Age: 79
End: 2024-12-17

## 2024-12-17 NOTE — TELEPHONE ENCOUNTER
Pt returning my call. She is not wanting to resume the injections and wants to continue with the oral meds instead. She believes she may have a UTI and had some other questions she would like to go over with either Dr. Dunn or JAYDE Vigil for outreach.

## 2024-12-17 NOTE — TELEPHONE ENCOUNTER
JN for pt to confirm which tx (oral or injection) she plans to go with. Next visit is 12/26 w/Dr. Dunn.

## 2025-01-02 ENCOUNTER — OFFICE VISIT (OUTPATIENT)
Dept: ONCOLOGY | Age: 80
End: 2025-01-02
Payer: MEDICARE

## 2025-01-02 ENCOUNTER — HOSPITAL ENCOUNTER (OUTPATIENT)
Dept: INFUSION THERAPY | Age: 80
Discharge: HOME | End: 2025-01-02
Payer: MEDICARE

## 2025-01-02 VITALS
BODY MASS INDEX: 34.66 KG/M2 | OXYGEN SATURATION: 99 % | RESPIRATION RATE: 16 BRPM | SYSTOLIC BLOOD PRESSURE: 147 MMHG | DIASTOLIC BLOOD PRESSURE: 68 MMHG | WEIGHT: 208 LBS | TEMPERATURE: 97.8 F | HEIGHT: 65 IN | HEART RATE: 96 BPM

## 2025-01-02 DIAGNOSIS — Z17.0 MALIGNANT NEOPLASM OF UPPER-OUTER QUADRANT OF RIGHT BREAST IN FEMALE, ESTROGEN RECEPTOR POSITIVE (HCC): ICD-10-CM

## 2025-01-02 DIAGNOSIS — C50.911 BREAST CANCER METASTASIZED TO SKIN, RIGHT (HCC): Primary | ICD-10-CM

## 2025-01-02 DIAGNOSIS — C50.911 CHEST WALL RECURRENCE OF BREAST CANCER, RIGHT (HCC): ICD-10-CM

## 2025-01-02 DIAGNOSIS — C50.411 MALIGNANT NEOPLASM OF UPPER-OUTER QUADRANT OF RIGHT BREAST IN FEMALE, ESTROGEN RECEPTOR POSITIVE (HCC): ICD-10-CM

## 2025-01-02 DIAGNOSIS — E05.00 GRAVES DISEASE: ICD-10-CM

## 2025-01-02 DIAGNOSIS — C79.89 CHEST WALL RECURRENCE OF BREAST CANCER, RIGHT (HCC): ICD-10-CM

## 2025-01-02 DIAGNOSIS — C79.2 BREAST CANCER METASTASIZED TO SKIN, RIGHT (HCC): Primary | ICD-10-CM

## 2025-01-02 DIAGNOSIS — C50.911 INVASIVE DUCTAL CARCINOMA OF RIGHT BREAST IN FEMALE (HCC): ICD-10-CM

## 2025-01-02 LAB
ALBUMIN SERPL-MCNC: 4.2 G/DL (ref 3.4–5)
ALBUMIN/GLOB SERPL: 2 {RATIO} (ref 1.1–2.2)
ALP SERPL-CCNC: 71 U/L (ref 40–129)
ALT SERPL-CCNC: 20 U/L (ref 10–40)
ANION GAP SERPL CALCULATED.3IONS-SCNC: 11 MMOL/L (ref 9–17)
AST SERPL-CCNC: 22 U/L (ref 15–37)
BASOPHILS # BLD: 0.01 K/UL
BASOPHILS NFR BLD: 0 % (ref 0–1)
BILIRUB SERPL-MCNC: 0.2 MG/DL (ref 0–1)
BUN SERPL-MCNC: 14 MG/DL (ref 7–20)
CALCIUM SERPL-MCNC: 10.1 MG/DL (ref 8.3–10.6)
CHLORIDE SERPL-SCNC: 105 MMOL/L (ref 99–110)
CO2 SERPL-SCNC: 23 MMOL/L (ref 21–32)
CREAT SERPL-MCNC: 1 MG/DL (ref 0.6–1.2)
EOSINOPHIL # BLD: 0.01 K/UL
EOSINOPHILS RELATIVE PERCENT: 0 % (ref 0–3)
ERYTHROCYTE [DISTWIDTH] IN BLOOD BY AUTOMATED COUNT: 13.6 % (ref 11.7–14.9)
GFR, ESTIMATED: 55 ML/MIN/1.73M2
GLUCOSE SERPL-MCNC: 134 MG/DL (ref 74–99)
HCT VFR BLD AUTO: 38.2 % (ref 37–47)
HGB BLD-MCNC: 12.6 G/DL (ref 12.5–16)
LYMPHOCYTES NFR BLD: 0.57 K/UL
LYMPHOCYTES RELATIVE PERCENT: 25 % (ref 24–44)
MCH RBC QN AUTO: 35.4 PG (ref 27–31)
MCHC RBC AUTO-ENTMCNC: 33 G/DL (ref 32–36)
MCV RBC AUTO: 107.3 FL (ref 78–100)
MONOCYTES NFR BLD: 0.26 K/UL
MONOCYTES NFR BLD: 12 % (ref 0–4)
NEUTROPHILS NFR BLD: 63 % (ref 36–66)
NEUTS SEG NFR BLD: 1.42 K/UL
PLATELET # BLD AUTO: 177 K/UL (ref 140–440)
PMV BLD AUTO: 8.9 FL (ref 7.5–11.1)
POTASSIUM SERPL-SCNC: 4.4 MMOL/L (ref 3.5–5.1)
PROT SERPL-MCNC: 6.2 G/DL (ref 6.4–8.2)
RBC # BLD AUTO: 3.56 M/UL (ref 4.2–5.4)
SODIUM SERPL-SCNC: 139 MMOL/L (ref 136–145)
WBC OTHER # BLD: 2.3 K/UL (ref 4–10.5)

## 2025-01-02 PROCEDURE — 36415 COLL VENOUS BLD VENIPUNCTURE: CPT

## 2025-01-02 PROCEDURE — 99214 OFFICE O/P EST MOD 30 MIN: CPT | Performed by: INTERNAL MEDICINE

## 2025-01-02 PROCEDURE — 85025 COMPLETE CBC W/AUTO DIFF WBC: CPT

## 2025-01-02 PROCEDURE — 86300 IMMUNOASSAY TUMOR CA 15-3: CPT

## 2025-01-02 PROCEDURE — 99211 OFF/OP EST MAY X REQ PHY/QHP: CPT

## 2025-01-02 PROCEDURE — 80053 COMPREHEN METABOLIC PANEL: CPT

## 2025-01-02 PROCEDURE — 1159F MED LIST DOCD IN RCRD: CPT | Performed by: INTERNAL MEDICINE

## 2025-01-02 PROCEDURE — 1124F ACP DISCUSS-NO DSCNMKR DOCD: CPT | Performed by: INTERNAL MEDICINE

## 2025-01-02 PROCEDURE — 1126F AMNT PAIN NOTED NONE PRSNT: CPT | Performed by: INTERNAL MEDICINE

## 2025-01-02 RX ORDER — ANASTROZOLE 1 MG/1
1 TABLET ORAL DAILY
Qty: 30 TABLET | Refills: 3 | Status: SHIPPED | OUTPATIENT
Start: 2025-01-02

## 2025-01-02 ASSESSMENT — PATIENT HEALTH QUESTIONNAIRE - PHQ9
2. FEELING DOWN, DEPRESSED OR HOPELESS: NOT AT ALL
1. LITTLE INTEREST OR PLEASURE IN DOING THINGS: NOT AT ALL
SUM OF ALL RESPONSES TO PHQ9 QUESTIONS 1 & 2: 0
SUM OF ALL RESPONSES TO PHQ QUESTIONS 1-9: 0

## 2025-01-02 NOTE — PROGRESS NOTES
MA Rooming Questions  Patient: Tasha Waggoner  MRN: [id number]    Date: 1/2/2025        1. Do you have any new issues?   yes - Not sure if she wants to continue taking Letrozole and Kisquali. C/o changes in vision, dizzy when waking up, states when she tries to say something she says it back words.          2. Do you need any refills on medications?    no    3. Have you had any imaging done since your last visit?   no    4. Have you been hospitalized or seen in the emergency room since your last visit here?   no    5. Did the patient have a depression screening completed today? Yes    No data recorded     PHQ-9 Given to (if applicable):               PHQ-9 Score (if applicable):                     [] Positive     []  Negative              Does question #9 need addressed (if applicable)                     [] Yes    []  No               Criselda Zaldivar CMA      
DNA with PIK3CA mutation, HER2 and ESR were negative tumor mutation burden stable  No actionable mutation RNA testing   Tempus liquid testing will be requested as well    8/13/2024:8/13/2024, skin and soft tissue right lateral chest wide excision for metastatic breast carcinoma.  2 foci seen 1 was 15 mm and the other small focus of 4 mm 6 mg away from the first tumor.  Margins involved    9/4/2024, right lateral chest reexcision of margins no base of residual cancer identified.    10/30/2024 CBC with normal limits CMP with creatinine of 1 calcium of 10.9 rest within normal limits CA 27-29 was 17    ANATOMIC SITE: R ax/scv   BEAM ORIENTATION: vmat  ENERGY: 6MV photons  DOSE PER FRACTION: 180cGy  TOTAL DOSE: 4500cGy; 25/33 planned fractions.     12/4/24 CBC, CMP, and CA 27.29 pending.     ELAPSED DAYS: 10/9/24 - 11/14/24    PAST MEDICAL HISTORY:   1. Hypertension since 1980s.,  2. Asthma since 2008.,  3. Allergies to mold, animals, dust, et cetera, seem to bother her ,  4. Arthritis since early 2000 involving her knee and neck. ,  5. Colonoscopy done in 2011 with biopsy of transverse colon showed benign mucosal lymphoid aggregates and a tubular adenoma from the ascending colon. The rest was negative. (Done by Dr. Pascal.) ,  6. CAT scan of the chest, which showed a 3 cm anterior chest wall nodule unchanged from July 2011, also present in February of 2009, when it was 2.5 cm.,  7. Chest x-ray: Nov 2015 No acute cardiopulmonary findings. ,  8. Mammogram of the left breast was negative for any lesions.     PAST SURGICAL HISTORY:   1. History of breast cancer as listed above.  2. Squamous cell cancer removed from the leg in 2004.  3. Cholecystectomy in 2004.   4. Heel spur surgery 2008.,  5. Right Total knee replacement in July of 2011.  6. Lesion removed from her left arm in 2010.   7. Joint replacement in her thumb in May of 2012.  8. Cystic lesions removed by Dr Pretty from her finger Sep 2014.  9. ear surgery April

## 2025-01-10 LAB — CANCER AG27-29 SERPL-ACNC: 18 U/ML (ref 0–38)

## 2025-01-29 NOTE — PROGRESS NOTES
Nilson Jesus Name: Tasha Waggoner  Patient : 1945  Patient MRN: [id number]     Primary Oncologist: Lona Dunn MD  PCP:Dr HILLARY Spivey  Surgeon Dr Chopra     Date of Service: 2025    Chief Complaint:   Chief Complaint   Patient presents with    Follow-up        Encounter Diagnoses   Name Primary?    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 Waggoner ( 45) has a history of right breast cancer, for which she had modified radical mastectomy in . She had a couple of local recurrences, treated with chemotherapy and radiation therapy and excision of the chest wall recurrences.  In  she had a total abdominal hysterectomy/bilateral salpingo-oophorectomy, which was done as part of hormonal therapy and Tamoxifen for one year. After that she did not show any evidence of recurrence.  In  she presented with right axillary mass which was biopsied and confirmed to be an invasive carcinoma, morphologically consistent with origin from her breast primary. Her ER was positive 90 percent, IN positive. HER-2/naomi was negative by FISH.   All other w/u including CT of the chest, abdomen and pelvis showed no evidence of any intraabdominal mass or metastases. Bone scan was negative for any bony mets. Her MUGA scan showed 61 percent ejection fraction.  She was started on Taxol d1&8 v6phecp for 6 courses starting in Dec of 2012 till 2013 and showed good partial response with mass size reduced to 2.4 cm in  from 3 cm in 2012 and completed 6 courses in 2013. Right Ax mass completely resolved over next couple years.  In May 2013 she was initiated on Hormonal therapy with Arimidex 1 mg daily for maintenance treatment.   In continued

## 2025-01-30 ENCOUNTER — HOSPITAL ENCOUNTER (OUTPATIENT)
Dept: INFUSION THERAPY | Age: 80
Discharge: HOME | End: 2025-01-30
Payer: MEDICARE

## 2025-01-30 ENCOUNTER — OFFICE VISIT (OUTPATIENT)
Dept: ONCOLOGY | Age: 80
End: 2025-01-30
Payer: MEDICARE

## 2025-01-30 VITALS
SYSTOLIC BLOOD PRESSURE: 131 MMHG | HEART RATE: 88 BPM | WEIGHT: 209.4 LBS | TEMPERATURE: 97.3 F | OXYGEN SATURATION: 100 % | BODY MASS INDEX: 34.89 KG/M2 | HEIGHT: 65 IN | DIASTOLIC BLOOD PRESSURE: 73 MMHG

## 2025-01-30 DIAGNOSIS — C50.911 BREAST CANCER METASTASIZED TO SKIN, RIGHT (HCC): Primary | ICD-10-CM

## 2025-01-30 DIAGNOSIS — C50.911 CHEST WALL RECURRENCE OF BREAST CANCER, RIGHT (HCC): ICD-10-CM

## 2025-01-30 DIAGNOSIS — C50.411 MALIGNANT NEOPLASM OF UPPER-OUTER QUADRANT OF RIGHT BREAST IN FEMALE, ESTROGEN RECEPTOR POSITIVE (HCC): ICD-10-CM

## 2025-01-30 DIAGNOSIS — C79.2 BREAST CANCER METASTASIZED TO SKIN, RIGHT (HCC): Primary | ICD-10-CM

## 2025-01-30 DIAGNOSIS — C79.89 CHEST WALL RECURRENCE OF BREAST CANCER, RIGHT (HCC): ICD-10-CM

## 2025-01-30 DIAGNOSIS — C50.911 INVASIVE DUCTAL CARCINOMA OF RIGHT BREAST IN FEMALE (HCC): ICD-10-CM

## 2025-01-30 DIAGNOSIS — Z17.0 MALIGNANT NEOPLASM OF UPPER-OUTER QUADRANT OF RIGHT BREAST IN FEMALE, ESTROGEN RECEPTOR POSITIVE (HCC): ICD-10-CM

## 2025-01-30 PROCEDURE — 99211 OFF/OP EST MAY X REQ PHY/QHP: CPT

## 2025-01-30 PROCEDURE — 1125F AMNT PAIN NOTED PAIN PRSNT: CPT | Performed by: INTERNAL MEDICINE

## 2025-01-30 PROCEDURE — 1124F ACP DISCUSS-NO DSCNMKR DOCD: CPT | Performed by: INTERNAL MEDICINE

## 2025-01-30 PROCEDURE — 1159F MED LIST DOCD IN RCRD: CPT | Performed by: INTERNAL MEDICINE

## 2025-01-30 PROCEDURE — 99214 OFFICE O/P EST MOD 30 MIN: CPT | Performed by: INTERNAL MEDICINE

## 2025-01-30 RX ORDER — METHOCARBAMOL 750 MG/1
750 TABLET, FILM COATED ORAL 2 TIMES DAILY
COMMUNITY
Start: 2025-01-24

## 2025-01-30 RX ORDER — MUPIROCIN 20 MG/G
OINTMENT TOPICAL
COMMUNITY
Start: 2025-01-13

## 2025-01-30 RX ORDER — NEOMYCIN SULFATE, POLYMYXIN B SULFATE, AND DEXAMETHASONE 3.5; 10000; 1 MG/G; [USP'U]/G; MG/G
OINTMENT OPHTHALMIC
COMMUNITY
Start: 2025-01-27

## 2025-01-30 NOTE — PROGRESS NOTES
MA Rooming Questions  Patient: Tasha Waggoner  MRN: [id number]    Date: 1/30/2025        1. Do you have any new issues?   yes - Patient states started taking robaxin for the pain in both shoulders. Patient states since starting this she has been dizzy, woozy, and forgetful. Patient states she has a bilateral eye infection in the Lower eyelids. States wants to discuss spot on her chin.         2. Do you need any refills on medications?    no    3. Have you had any imaging done since your last visit?   no    4. Have you been hospitalized or seen in the emergency room since your last visit here?   no    5. Did the patient have a depression screening completed today? No    No data recorded     PHQ-9 Given to (if applicable):               PHQ-9 Score (if applicable):                     [] Positive     []  Negative              Does question #9 need addressed (if applicable)                     [] Yes    []  No               Samanta Jeffries MA

## 2025-01-31 DIAGNOSIS — Z17.0 MALIGNANT NEOPLASM OF UPPER-OUTER QUADRANT OF RIGHT BREAST IN FEMALE, ESTROGEN RECEPTOR POSITIVE (HCC): Primary | ICD-10-CM

## 2025-01-31 DIAGNOSIS — C50.411 MALIGNANT NEOPLASM OF UPPER-OUTER QUADRANT OF RIGHT BREAST IN FEMALE, ESTROGEN RECEPTOR POSITIVE (HCC): Primary | ICD-10-CM

## 2025-03-10 ENCOUNTER — HOSPITAL ENCOUNTER (OUTPATIENT)
Dept: WOMENS IMAGING | Age: 80
Discharge: HOME | End: 2025-03-10
Attending: INTERNAL MEDICINE
Payer: MEDICARE

## 2025-03-10 ENCOUNTER — HOSPITAL ENCOUNTER (OUTPATIENT)
Dept: ULTRASOUND IMAGING | Age: 80
End: 2025-03-10
Attending: INTERNAL MEDICINE
Payer: MEDICARE

## 2025-03-10 VITALS — HEIGHT: 65 IN | WEIGHT: 162 LBS | BODY MASS INDEX: 26.99 KG/M2

## 2025-03-10 DIAGNOSIS — Z17.0 MALIGNANT NEOPLASM OF UPPER-OUTER QUADRANT OF RIGHT BREAST IN FEMALE, ESTROGEN RECEPTOR POSITIVE (HCC): ICD-10-CM

## 2025-03-10 DIAGNOSIS — C50.411 MALIGNANT NEOPLASM OF UPPER-OUTER QUADRANT OF RIGHT BREAST IN FEMALE, ESTROGEN RECEPTOR POSITIVE (HCC): ICD-10-CM

## 2025-03-10 PROCEDURE — G0279 TOMOSYNTHESIS, MAMMO: HCPCS

## 2025-04-04 RX ORDER — ANASTROZOLE 1 MG/1
1 TABLET ORAL DAILY
Qty: 90 TABLET | Refills: 0 | Status: SHIPPED | OUTPATIENT
Start: 2025-04-04

## 2025-05-05 ENCOUNTER — HOSPITAL ENCOUNTER (OUTPATIENT)
Dept: INFUSION THERAPY | Age: 80
Discharge: HOME | End: 2025-05-05
Payer: MEDICARE

## 2025-05-05 DIAGNOSIS — C79.2 BREAST CANCER METASTASIZED TO SKIN, RIGHT (HCC): ICD-10-CM

## 2025-05-05 DIAGNOSIS — C50.411 MALIGNANT NEOPLASM OF UPPER-OUTER QUADRANT OF RIGHT BREAST IN FEMALE, ESTROGEN RECEPTOR POSITIVE (HCC): ICD-10-CM

## 2025-05-05 DIAGNOSIS — C50.911 BREAST CANCER METASTASIZED TO SKIN, RIGHT (HCC): ICD-10-CM

## 2025-05-05 DIAGNOSIS — C79.89 CHEST WALL RECURRENCE OF BREAST CANCER, RIGHT (HCC): ICD-10-CM

## 2025-05-05 DIAGNOSIS — C50.911 INVASIVE DUCTAL CARCINOMA OF RIGHT BREAST IN FEMALE (HCC): ICD-10-CM

## 2025-05-05 DIAGNOSIS — C50.911 CHEST WALL RECURRENCE OF BREAST CANCER, RIGHT (HCC): ICD-10-CM

## 2025-05-05 DIAGNOSIS — Z17.0 MALIGNANT NEOPLASM OF UPPER-OUTER QUADRANT OF RIGHT BREAST IN FEMALE, ESTROGEN RECEPTOR POSITIVE (HCC): ICD-10-CM

## 2025-05-05 LAB
ALBUMIN SERPL-MCNC: 4.2 G/DL (ref 3.4–5)
ALBUMIN/GLOB SERPL: 1.5 {RATIO} (ref 1.1–2.2)
ALP SERPL-CCNC: 71 U/L (ref 40–129)
ALT SERPL-CCNC: 21 U/L (ref 10–40)
ANION GAP SERPL CALCULATED.3IONS-SCNC: 11 MMOL/L (ref 9–17)
AST SERPL-CCNC: 20 U/L (ref 15–37)
BASOPHILS # BLD: 0.11 K/UL
BASOPHILS NFR BLD: 2 % (ref 0–1)
BILIRUB SERPL-MCNC: 0.2 MG/DL (ref 0–1)
BUN SERPL-MCNC: 20 MG/DL (ref 7–20)
CALCIUM SERPL-MCNC: 10.7 MG/DL (ref 8.3–10.6)
CHLORIDE SERPL-SCNC: 102 MMOL/L (ref 99–110)
CO2 SERPL-SCNC: 25 MMOL/L (ref 21–32)
CREAT SERPL-MCNC: 0.9 MG/DL (ref 0.6–1.2)
EOSINOPHIL # BLD: 0 K/UL
EOSINOPHILS RELATIVE PERCENT: 0 % (ref 0–3)
ERYTHROCYTE [DISTWIDTH] IN BLOOD BY AUTOMATED COUNT: 12.4 % (ref 11.7–14.9)
GFR, ESTIMATED: 60 ML/MIN/1.73M2
GLUCOSE SERPL-MCNC: 94 MG/DL (ref 74–99)
HCT VFR BLD AUTO: 42.6 % (ref 37–47)
HGB BLD-MCNC: 14.3 G/DL (ref 12.5–16)
LYMPHOCYTES NFR BLD: 0.67 K/UL
LYMPHOCYTES RELATIVE PERCENT: 12 % (ref 24–44)
MCH RBC QN AUTO: 35.8 PG (ref 27–31)
MCHC RBC AUTO-ENTMCNC: 33.6 G/DL (ref 32–36)
MCV RBC AUTO: 106.8 FL (ref 78–100)
MONOCYTES NFR BLD: 0.56 K/UL
MONOCYTES NFR BLD: 10 % (ref 0–5)
NEUTROPHILS NFR BLD: 76 % (ref 36–66)
NEUTS SEG NFR BLD: 4.26 K/UL
PLATELET # BLD AUTO: 219 K/UL (ref 140–440)
PLATELET ESTIMATE: NORMAL
PMV BLD AUTO: 9.4 FL (ref 7.5–11.1)
POTASSIUM SERPL-SCNC: 4.3 MMOL/L (ref 3.5–5.1)
PROT SERPL-MCNC: 7 G/DL (ref 6.4–8.2)
RBC # BLD AUTO: 3.99 M/UL (ref 4.2–5.4)
RBC # BLD: ABNORMAL 10*6/UL
SODIUM SERPL-SCNC: 137 MMOL/L (ref 136–145)
WBC # BLD: ABNORMAL 10*3/UL
WBC OTHER # BLD: 5.6 K/UL (ref 4–10.5)

## 2025-05-05 PROCEDURE — 99212 OFFICE O/P EST SF 10 MIN: CPT

## 2025-05-05 PROCEDURE — 80053 COMPREHEN METABOLIC PANEL: CPT

## 2025-05-05 PROCEDURE — 86300 IMMUNOASSAY TUMOR CA 15-3: CPT

## 2025-05-05 PROCEDURE — 36415 COLL VENOUS BLD VENIPUNCTURE: CPT

## 2025-05-05 PROCEDURE — 85025 COMPLETE CBC W/AUTO DIFF WBC: CPT

## 2025-05-08 LAB
MISCELLANEOUS LAB TEST RESULT: NORMAL
TEST NAME: NORMAL

## 2025-05-12 ENCOUNTER — HOSPITAL ENCOUNTER (OUTPATIENT)
Dept: INFUSION THERAPY | Age: 80
Discharge: HOME | End: 2025-05-12
Payer: MEDICARE

## 2025-05-12 ENCOUNTER — OFFICE VISIT (OUTPATIENT)
Dept: ONCOLOGY | Age: 80
End: 2025-05-12
Payer: MEDICARE

## 2025-05-12 VITALS
DIASTOLIC BLOOD PRESSURE: 67 MMHG | SYSTOLIC BLOOD PRESSURE: 130 MMHG | TEMPERATURE: 98.7 F | OXYGEN SATURATION: 97 % | HEART RATE: 85 BPM | BODY MASS INDEX: 26.96 KG/M2 | HEIGHT: 65 IN

## 2025-05-12 DIAGNOSIS — C50.911 BREAST CANCER METASTASIZED TO SKIN, RIGHT (HCC): Primary | ICD-10-CM

## 2025-05-12 DIAGNOSIS — C50.911 CHEST WALL RECURRENCE OF BREAST CANCER, RIGHT (HCC): ICD-10-CM

## 2025-05-12 DIAGNOSIS — C79.89 CHEST WALL RECURRENCE OF BREAST CANCER, RIGHT (HCC): ICD-10-CM

## 2025-05-12 DIAGNOSIS — C79.2 BREAST CANCER METASTASIZED TO SKIN, RIGHT (HCC): Primary | ICD-10-CM

## 2025-05-12 PROCEDURE — 99212 OFFICE O/P EST SF 10 MIN: CPT

## 2025-05-12 PROCEDURE — 1159F MED LIST DOCD IN RCRD: CPT | Performed by: INTERNAL MEDICINE

## 2025-05-12 PROCEDURE — 1124F ACP DISCUSS-NO DSCNMKR DOCD: CPT | Performed by: INTERNAL MEDICINE

## 2025-05-12 PROCEDURE — 99214 OFFICE O/P EST MOD 30 MIN: CPT | Performed by: INTERNAL MEDICINE

## 2025-05-12 RX ORDER — ANASTROZOLE 1 MG/1
1 TABLET ORAL DAILY
Qty: 90 TABLET | Refills: 0 | Status: SHIPPED | OUTPATIENT
Start: 2025-05-12 | End: 2025-05-12

## 2025-05-12 RX ORDER — SERTRALINE HYDROCHLORIDE 25 MG/1
TABLET, FILM COATED ORAL
COMMUNITY
Start: 2025-03-21

## 2025-05-12 RX ORDER — ANASTROZOLE 1 MG/1
1 TABLET ORAL DAILY
Qty: 90 TABLET | Refills: 5 | Status: SHIPPED | OUTPATIENT
Start: 2025-05-12

## 2025-05-12 RX ORDER — OXYBUTYNIN CHLORIDE 10 MG/1
10 TABLET, EXTENDED RELEASE ORAL DAILY
COMMUNITY
Start: 2025-04-10

## 2025-05-12 RX ORDER — LIDOCAINE 50 MG/G
OINTMENT TOPICAL
COMMUNITY
Start: 2025-03-19

## 2025-05-12 NOTE — PROGRESS NOTES
MA Rooming Questions  Patient: Tasha Waggoner  MRN: [id number]    Date: 5/12/2025        1. Do you have any new issues?   yes - Patient states she believes the radiation has caused her joints to start freezing. Patient states she got pain injections with Dr. Castellano but it only last a few days. Patient would like to know what the next step should be. Patient also believes she has a UTI, slight burning with urination, and confusion. Patient is taking oxybutynin to prevent incontinence. Patient also states she has tremors  she states the buspar didn't provide any relief.      2. Do you need any refills on medications?    yes - anastrazole     3. Have you had any imaging done since your last visit?   yes - labs 5/5 everette 3/10    4. Have you been hospitalized or seen in the emergency room since your last visit here?   yes - 2/21    5. Did the patient have a depression screening completed today? No    No data recorded     PHQ-9 Given to (if applicable):               PHQ-9 Score (if applicable):                     [] Positive     []  Negative              Does question #9 need addressed (if applicable)                     [] Yes    []  No               Sienna Mims CMA

## 2025-05-12 NOTE — PROGRESS NOTES
Nilson Jesus Name: Tasha Waggoner  Patient : 1945  Patient MRN: [id number]     Primary Oncologist: Lona Dunn MD  PCP:Dr HILLARY Spivey  Surgeon Dr Chopra     Date of Service: 2025    Chief Complaint:   No chief complaint on file.       Encounter Diagnoses   Name Primary?    Breast cancer metastasized to skin, right (HCC) Yes    Chest wall recurrence of breast cancer, right (HCC)                HPI  Mrs. Waggoner ( 45) has a history of right breast cancer, for which she had modified radical mastectomy in . She had a couple of local recurrences, treated with chemotherapy and radiation therapy and excision of the chest wall recurrences.  In  she had a total abdominal hysterectomy/bilateral salpingo-oophorectomy, which was done as part of hormonal therapy and Tamoxifen for one year. After that she did not show any evidence of recurrence.  In  she presented with right axillary mass which was biopsied and confirmed to be an invasive carcinoma, morphologically consistent with origin from her breast primary. Her ER was positive 90 percent, AR positive. HER-2/naomi was negative by FISH.   All other w/u including CT of the chest, abdomen and pelvis showed no evidence of any intraabdominal mass or metastases. Bone scan was negative for any bony mets. Her MUGA scan showed 61 percent ejection fraction.  She was started on Taxol d1&8 n8pyoco for 6 courses starting in Dec of 2012 till 2013 and showed good partial response with mass size reduced to 2.4 cm in  from 3 cm in 2012 and completed 6 courses in 2013. Right Ax mass completely resolved over next couple years.  In May 2013 she was initiated on Hormonal therapy with Arimidex 1 mg daily for maintenance treatment.   In continued Clinical remission. 2017 Hemoglobin 14.3, hematocrit 42, white count 5,900, platelet 234,000, BUN 17, creatinine 0.7. Sodium was 141, potassium 3.4, chloride 95, calcium 10.0. The rest of the

## (undated) DEVICE — FORCEPS ENDOSCP L230CM WRK CHN 2.8CM SHTH 2.4MM JAW OPN

## (undated) DEVICE — Z DISCONTINUED NO SUB IDED TUBING ETCO2 AD L6.5FT NSL ORAL CVD PRNG NONFLARED TIP OVR

## (undated) DEVICE — ENDOSCOPY KIT: Brand: MEDLINE INDUSTRIES, INC.